# Patient Record
Sex: MALE | Race: WHITE | NOT HISPANIC OR LATINO | Employment: OTHER | URBAN - METROPOLITAN AREA
[De-identification: names, ages, dates, MRNs, and addresses within clinical notes are randomized per-mention and may not be internally consistent; named-entity substitution may affect disease eponyms.]

---

## 2017-04-21 ENCOUNTER — HOSPITAL ENCOUNTER (EMERGENCY)
Facility: MEDICAL CENTER | Age: 77
End: 2017-04-22
Attending: EMERGENCY MEDICINE
Payer: MEDICARE

## 2017-04-21 ENCOUNTER — APPOINTMENT (OUTPATIENT)
Dept: RADIOLOGY | Facility: MEDICAL CENTER | Age: 77
End: 2017-04-21
Attending: EMERGENCY MEDICINE
Payer: MEDICARE

## 2017-04-21 DIAGNOSIS — K40.21 BILATERAL RECURRENT INGUINAL HERNIA WITHOUT OBSTRUCTION OR GANGRENE: ICD-10-CM

## 2017-04-21 PROCEDURE — 85025 COMPLETE CBC W/AUTO DIFF WBC: CPT

## 2017-04-21 PROCEDURE — 36415 COLL VENOUS BLD VENIPUNCTURE: CPT

## 2017-04-21 PROCEDURE — 74176 CT ABD & PELVIS W/O CONTRAST: CPT

## 2017-04-21 PROCEDURE — 80053 COMPREHEN METABOLIC PANEL: CPT

## 2017-04-21 PROCEDURE — 99284 EMERGENCY DEPT VISIT MOD MDM: CPT

## 2017-04-21 ASSESSMENT — PAIN SCALES - GENERAL: PAINLEVEL_OUTOF10: 5

## 2017-04-21 ASSESSMENT — LIFESTYLE VARIABLES: DO YOU DRINK ALCOHOL: NO

## 2017-04-21 NOTE — ED AVS SNAPSHOT
[employer] Access Code: [email or web address]  Expires: 5/22/2017  1:17 AM    Your email address is not on file at [facility] [employer].  Email Addresses are required for you to sign up for [employer], please contact [phone] to verify your personal information and to provide your email address prior to attempting to register for [employer].    Sina Debbie  [address] [place] [address] [address]  [place] [address]    [employer]  A secure, online tool to manage your health information     [employer]’s [employer]® is a secure, online tool that connects you to your personalized health information from the privacy of your home -- day or night - making it very easy for you to manage your healthcare. Once the activation process is completed, you can even access your medical information using the [employer] pool, which is available for free in the Apple Pool store or Google Play store.     To learn more about [employer], visit [email or web address]    There are two levels of access available (as shown below):   My Chart Features  [facility] Primary Care Doctor [facility]  Specialists [facility]  Urgent  Care Non-[facility] Primary Care Doctor   Email your healthcare team securely and privately 24/7 X X X    Manage appointments: schedule your next appointment; view details of past/upcoming appointments X      Request prescription refills. X      View recent personal medical records, including lab and immunizations X X X X   View health record, including health history, allergies, medications X X X X   Read reports about your outpatient visits, procedures, consult and ER notes X X X X   See your discharge summary, which is a recap of your hospital and/or ER visit that includes your diagnosis, lab results, and care plan X X  X     How to register for [facility]:  Once your e-mail address has been verified, follow the following steps to sign up for [employer].     1. Go to  [email or web address]  2. Click on the Sign Up Now box, which takes you to the New Member Sign Up page. You  will need to provide the following information:  a. Enter your [employer] Access Code exactly as it appears at the top of this page. (You will not need to use this code after you’ve completed the sign-up process. If you do not sign up before the expiration date, you must request a new code.)   b. Enter your date of birth.   c. Enter your home email address.   d. Click Submit, and follow the next screen’s instructions.  3. Create a [employer] ID. This will be your [employer] login ID and cannot be changed, so think of one that is secure and easy to remember.  4. Create a [employer] password. You can change your password at any time.  5. Enter your Password Reset Question and Answer. This can be used at a later time if you forget your password.   6. Enter your e-mail address. This allows you to receive e-mail notifications when new information is available in [employer]  7. Click Sign Up. You can now view your health information.    For assistance activating your [employer] account, call [phone]

## 2017-04-21 NOTE — ED AVS SNAPSHOT
Home Care Instructions                                                                                                                Sina Lewis   MRN: [id number]    Department:  [facility], Emergency Dept   Date of Visit:  4/21/2017            [facility], Emergency Dept    [address] [address]    Phone:  [phone]      You were seen by     Rola Rea M.D.      Your Diagnosis Was     Bilateral recurrent inguinal hernia without obstruction or gangrene     K40.21       Follow-up Information     1. Follow up with Matias Chun M.D.. Go in 2 days.    Specialty:  Family Medicine    Why:  For complete recheck    Contact information    [address] [facility] [address]  [address]  [place] [address]  [phone]          2. Schedule an appointment as soon as possible for a visit with Eufemia Acevedo M.D..    Specialty:  Surgery    Why:  For hernia recheck    Contact information    [address]  [address]  [facility] [address]  [phone]        Medication Information     Review all of your home medications and newly ordered medications with your primary doctor and/or pharmacist as soon as possible. Follow medication instructions as directed by your doctor and/or pharmacist.     Please keep your complete medication list with you and share with your physician. Update the information when medications are discontinued, doses are changed, or new medications (including over-the-counter products) are added; and carry medication information at all times in the event of emergency situations.               Medication List      ASK your doctor about these medications        Instructions    Morning Afternoon Evening Bedtime    aspirin 81 MG tablet        Take 81 mg by mouth every day. Enteric coated   Dose:  81 mg                        doxazosin 4 MG Tabs   Commonly known as:  CARDURA        Take 4 mg by mouth every day.   Dose:  4 mg                        LORAZEPAM        Take 2 mg by  mouth every bedtime.   Dose:  2 mg                        lovastatin 40 MG tablet   Commonly known as:  MEVACOR        Take 40 mg by mouth every evening.   Dose:  40 mg                        metoprolol SR 25 MG Tb24   Commonly known as:  TOPROL XL        Take 25 mg by mouth every day.   Dose:  25 mg                        nitroglycerin 0.4 MG Subl   Commonly known as:  NITROSTAT        Place 0.4 mg under tongue every 5 minutes as needed. May repeat x 3 for unrelieved chest pain   Dose:  0.4 mg                        omeprazole 20 MG delayed-release capsule   Commonly known as:  PRILOSEC        QDAY.                        SEROQUEL 200 MG Tabs   Generic drug:  quetiapine        Take 200 mg by mouth every day.   Dose:  200 mg                        SYNTHROID 75 MCG Tabs   Generic drug:  levothyroxine        Take 75 mcg by mouth every day.   Dose:  75 mcg                        tramadol 50 MG Tabs   Commonly known as:  ULTRAM        Take  mg by mouth every four hours as needed.   Dose:   mg                        zolpidem 5 MG Tabs   Commonly known as:  AMBIEN        Take 5 mg by mouth at bedtime as needed.   Dose:  5 mg                                Procedures and tests performed during your visit     CBC WITH DIFFERENTIAL    COMP METABOLIC PANEL    CT-RENAL COLIC EVALUATION(A/P W/O)    ESTIMATED GFR        Discharge Instructions       Please follow-up with your primary care provider, you may also contact the general surgeon listed for evaluation of your hernia if the pain continues. Return to the emergency department if he develops any new or worsening symptoms including nausea, vomiting, fevers, worsening pain, redness or swelling over the site of hernia, constipation or other concerns.      Inguinal Hernia, Adult , Care After  Refer to this sheet in the next few weeks. These discharge instructions provide you with general information on caring for yourself after you leave the hospital. Your caregiver  may also give you specific instructions. Your treatment has been planned according to the most current medical practices available, but unavoidable complications sometimes occur. If you have any problems or questions after discharge, please call your caregiver.  HOME CARE INSTRUCTIONS  · Put ice on the operative site.  ¨ Put ice in a plastic bag.  ¨ Place a towel between your skin and the bag.  ¨ Leave the ice on for 15-20 minutes at a time, 03-04 times a day while awake.  · Change bandages (dressings) as directed.  · Keep the wound dry and clean. The wound may be washed gently with soap and water. Gently blot or dab the wound dry. It is okay to take showers 24 to 48 hours after surgery. Do not take baths, use swimming pools, or use hot tubs for 10 days, or as directed by your caregiver.  · Only take over-the-counter or prescription medicines for pain, discomfort, or fever as directed by your caregiver.  · Continue your normal diet as directed.  · Do not lift anything more than 10 pounds or play contact sports for 3 weeks, or as directed.  SEEK MEDICAL CARE IF:  · There is redness, swelling, or increasing pain in the wound.  · There is fluid (pus) coming from the wound.  · There is drainage from a wound lasting longer than 1 day.  · You have an oral temperature above 102° F (38.9° C).  · You notice a bad smell coming from the wound or dressing.  · The wound breaks open after the stitches (sutures) have been removed.  · You notice increasing pain in the shoulders (shoulder strap areas).  · You develop dizzy episodes or fainting while standing.  · You feel sick to your stomach (nauseous) or throw up (vomit).  SEEK IMMEDIATE MEDICAL CARE IF:  · You develop a rash.  · You have difficulty breathing.  · You develop a reaction or have side effects to medicines you were given.  MAKE SURE YOU:   · Understand these instructions.  · Will watch your condition.  · Will get help right away if you are not doing well or get  worse.     This information is not intended to replace advice given to you by your health care provider. Make sure you discuss any questions you have with your health care provider.     Document Released: 01/18/2008 Document Revised: 01/08/2016 Document Reviewed: 11/17/2010  Elsevier Interactive Patient Education ©2016 [employer]            Patient Information     Patient Information    Following emergency treatment: all patient requiring follow-up care must return either to a private physician or a clinic if your condition worsens before you are able to obtain further medical attention, please return to the emergency room.     Billing Information    At [facility], we work to make the billing process streamlined for our patients.  Our Representatives are here to answer any questions you may have regarding your hospital bill.  If you have insurance coverage and have supplied your insurance information to us, we will submit a claim to your insurer on your behalf.  Should you have any questions regarding your bill, we can be reached online or by phone as follows:  Online: You are able pay your bills online or live chat with our representatives about any billing questions you may have. We are here to help Monday - Friday from 8:00am to 7:30pm and 9:00am - 12:00pm on Saturdays.  Please visit [email or web address]  for more information.   Phone:  [phone] or [phone]    Please note that your emergency physician, surgeon, pathologist, radiologist, anesthesiologist, and other specialists are not employed by [facility] and will therefore bill separately for their services.  Please contact them directly for any questions concerning their bills at the numbers below:     Emergency Physician Services:  [phone]  [facility]:  [phone]  [facility]:  [phone]  [facility]:  [phone]    1. Your final bill may vary from the  amount quoted upon discharge if all procedures are not complete at that time, or if your doctor has additional procedures of which we are not aware. You will receive an additional bill if you return to the Emergency Department at [facility] for suture removal regardless of the facility of which the sutures were placed.     2. Please arrange for settlement of this account at the emergency registration.    3. All self-pay accounts are due in full at the time of treatment.  If you are unable to meet this obligation then payment is expected within 4-5 days.     4. If you have had radiology studies (CT, X-ray, Ultrasound, MRI), you have received a preliminary result during your emergency department visit. Please contact the radiology department [phone] to receive a copy of your final result. Please discuss the Final result with your primary physician or with the follow up physician provided.     Crisis Hotline:  [place] Crisis Hotline:  5-841-WCUVNQT or 1-177.252.5639  [place] Crisis Hotline:    1-345.567.3394 or 121-645-7112         ED Discharge Follow Up Questions    1. In order to provide you with very good care, we would like to follow up with a phone call in the next few days.  May we have your permission to contact you?     YES /  NO    2. What is the best phone number to call you? (       )_____-__________    3. What is the best time to call you?      Morning  /  Afternoon  /  Evening                   Patient Signature:  ____________________________________________________________    Date:  ____________________________________________________________

## 2017-04-21 NOTE — ED AVS SNAPSHOT
4/22/2017    Sina Lewis  [address]  [place] [address]    Dear Sina:    [facility] wants to ensure your discharge home is safe and you or your loved ones have had all of your questions answered regarding your care after you leave the hospital.    Below is a list of resources and contact information should you have any questions regarding your hospital stay, follow-up instructions, or active medical symptoms.    Questions or Concerns Regarding… Contact   Medical Questions Related to Your Discharge  (7 days a week, 8am-5pm) Contact a Nurse Care Coordinator   [phone]   Medical Questions Not Related to Your Discharge  (24 hours a day / 7 days a week)  Contact the Nurse Health Line   [phone]    Medications or Discharge Instructions Refer to your discharge packet   or contact your [facility] Primary Care Provider   [phone]   Follow-up Appointment(s) Schedule your appointment via Stand In   or contact Scheduling [phone]   Billing Review your statement via [employer] In  or contact Billing [phone]   Medical Records Review your records via [employer]   or contact Medical Records [phone]     You may receive a telephone call within two days of discharge. This call is to make certain you understand your discharge instructions and have the opportunity to have any questions answered. You can also easily access your medical information, test results and upcoming appointments via the Stand In free online health management tool. You can learn more and sign up at [employer]/Stand In. For assistance setting up your [employer] In account, please call [phone].    Once again, we want to ensure your discharge home is safe and that you have a clear understanding of any next steps in your care. If you have any questions or concerns, please do not hesitate to contact us, we are here for you. Thank you for choosing [facility] for your healthcare needs.    Sincerely,    Your [facility] Healthcare Team

## 2017-04-22 VITALS
TEMPERATURE: 98.1 F | HEART RATE: 61 BPM | DIASTOLIC BLOOD PRESSURE: 88 MMHG | OXYGEN SATURATION: 93 % | SYSTOLIC BLOOD PRESSURE: 162 MMHG | HEIGHT: 69 IN | RESPIRATION RATE: 16 BRPM | WEIGHT: 219.8 LBS | BODY MASS INDEX: 32.56 KG/M2

## 2017-04-22 LAB
ALBUMIN SERPL BCP-MCNC: 4.3 G/DL (ref 3.2–4.9)
ALBUMIN/GLOB SERPL: 1.4 G/DL
ALP SERPL-CCNC: 86 U/L (ref 30–99)
ALT SERPL-CCNC: 32 U/L (ref 2–50)
ANION GAP SERPL CALC-SCNC: 11 MMOL/L (ref 0–11.9)
AST SERPL-CCNC: 32 U/L (ref 12–45)
BASOPHILS # BLD AUTO: 0.8 % (ref 0–1.8)
BASOPHILS # BLD: 0.06 K/UL (ref 0–0.12)
BILIRUB SERPL-MCNC: 0.4 MG/DL (ref 0.1–1.5)
BUN SERPL-MCNC: 17 MG/DL (ref 8–22)
CALCIUM SERPL-MCNC: 9.1 MG/DL (ref 8.5–10.5)
CHLORIDE SERPL-SCNC: 108 MMOL/L (ref 96–112)
CO2 SERPL-SCNC: 23 MMOL/L (ref 20–33)
CREAT SERPL-MCNC: 1.68 MG/DL (ref 0.5–1.4)
EOSINOPHIL # BLD AUTO: 0.32 K/UL (ref 0–0.51)
EOSINOPHIL NFR BLD: 4 % (ref 0–6.9)
ERYTHROCYTE [DISTWIDTH] IN BLOOD BY AUTOMATED COUNT: 53.9 FL (ref 35.9–50)
GFR SERPL CREATININE-BSD FRML MDRD: 40 ML/MIN/1.73 M 2
GLOBULIN SER CALC-MCNC: 3.1 G/DL (ref 1.9–3.5)
GLUCOSE SERPL-MCNC: 101 MG/DL (ref 65–99)
HCT VFR BLD AUTO: 43.7 % (ref 42–52)
HGB BLD-MCNC: 14.4 G/DL (ref 14–18)
IMM GRANULOCYTES # BLD AUTO: 0.02 K/UL (ref 0–0.11)
IMM GRANULOCYTES NFR BLD AUTO: 0.3 % (ref 0–0.9)
LYMPHOCYTES # BLD AUTO: 2.92 K/UL (ref 1–4.8)
LYMPHOCYTES NFR BLD: 36.7 % (ref 22–41)
MCH RBC QN AUTO: 30.8 PG (ref 27–33)
MCHC RBC AUTO-ENTMCNC: 33 G/DL (ref 33.7–35.3)
MCV RBC AUTO: 93.6 FL (ref 81.4–97.8)
MONOCYTES # BLD AUTO: 0.65 K/UL (ref 0–0.85)
MONOCYTES NFR BLD AUTO: 8.2 % (ref 0–13.4)
NEUTROPHILS # BLD AUTO: 3.99 K/UL (ref 1.82–7.42)
NEUTROPHILS NFR BLD: 50 % (ref 44–72)
NRBC # BLD AUTO: 0 K/UL
NRBC BLD AUTO-RTO: 0 /100 WBC
PLATELET # BLD AUTO: 138 K/UL (ref 164–446)
PMV BLD AUTO: 10.7 FL (ref 9–12.9)
POTASSIUM SERPL-SCNC: 4.3 MMOL/L (ref 3.6–5.5)
PROT SERPL-MCNC: 7.4 G/DL (ref 6–8.2)
RBC # BLD AUTO: 4.67 M/UL (ref 4.7–6.1)
SODIUM SERPL-SCNC: 142 MMOL/L (ref 135–145)
WBC # BLD AUTO: 8 K/UL (ref 4.8–10.8)

## 2017-04-22 NOTE — DISCHARGE INSTRUCTIONS
Please follow-up with your primary care provider, you may also contact the general surgeon listed for evaluation of your hernia if the pain continues. Return to the emergency department if he develops any new or worsening symptoms including nausea, vomiting, fevers, worsening pain, redness or swelling over the site of hernia, constipation or other concerns.      Inguinal Hernia, Adult , Care After  Refer to this sheet in the next few weeks. These discharge instructions provide you with general information on caring for yourself after you leave the hospital. Your caregiver may also give you specific instructions. Your treatment has been planned according to the most current medical practices available, but unavoidable complications sometimes occur. If you have any problems or questions after discharge, please call your caregiver.  HOME CARE INSTRUCTIONS  · Put ice on the operative site.  ¨ Put ice in a plastic bag.  ¨ Place a towel between your skin and the bag.  ¨ Leave the ice on for 15-20 minutes at a time, 03-04 times a day while awake.  · Change bandages (dressings) as directed.  · Keep the wound dry and clean. The wound may be washed gently with soap and water. Gently blot or dab the wound dry. It is okay to take showers 24 to 48 hours after surgery. Do not take baths, use swimming pools, or use hot tubs for 10 days, or as directed by your caregiver.  · Only take over-the-counter or prescription medicines for pain, discomfort, or fever as directed by your caregiver.  · Continue your normal diet as directed.  · Do not lift anything more than 10 pounds or play contact sports for 3 weeks, or as directed.  SEEK MEDICAL CARE IF:  · There is redness, swelling, or increasing pain in the wound.  · There is fluid (pus) coming from the wound.  · There is drainage from a wound lasting longer than 1 day.  · You have an oral temperature above 102° F (38.9° C).  · You notice a bad smell coming from the wound or  dressing.  · The wound breaks open after the stitches (sutures) have been removed.  · You notice increasing pain in the shoulders (shoulder strap areas).  · You develop dizzy episodes or fainting while standing.  · You feel sick to your stomach (nauseous) or throw up (vomit).  SEEK IMMEDIATE MEDICAL CARE IF:  · You develop a rash.  · You have difficulty breathing.  · You develop a reaction or have side effects to medicines you were given.  MAKE SURE YOU:   · Understand these instructions.  · Will watch your condition.  · Will get help right away if you are not doing well or get worse.     This information is not intended to replace advice given to you by your health care provider. Make sure you discuss any questions you have with your health care provider.     Document Released: 01/18/2008 Document Revised: 01/08/2016 Document Reviewed: 11/17/2010  [employer] Interactive Patient Education ©2016 [employer]

## 2017-04-22 NOTE — ED NOTES
Chief Complaint   Patient presents with   • LLQ Pain     inguinal area.  increases with palpation.  X1 month.  pt has history of bilateral hernia repair.       Denies difficulty urinating.  Appears to be in minimal distress.  Triage process explained to patient.  Pt back to waiting room.  Pt instructed to inform RN if any changes or questions arise.

## 2017-04-22 NOTE — ED PROVIDER NOTES
ED Provider Note    ED Provider Note    Scribed for Rola Rea M.D. by Rola Rea. 4/21/2017, 11:17 PM.    Primary care provider: Matias Chun M.D.  Means of arrival: walk-in  History obtained from: patient  History limited by: none    CHIEF COMPLAINT  Chief Complaint   Patient presents with   • LLQ Pain     inguinal area.  increases with palpation.  X1 month.  pt has history of bilateral hernia repair.         HPI  Sina Lewis is a 76 y.o. male who presents to the Emergency Department with bilateral inguinal pain, right greater than left on my assessment. He states he has had waxing and waning pain localized to bilateral inguinal areas onset 5-6 months ago, becoming more persistent but not worsening in severity. He states pressing on the affected area significantly exacerbates the pain, no alleviating factors. He does have a history of bilateral inguinal hernia repair, has not felt any abnormal bulges. He denies fevers, no nausea, no vomiting. He does endorse increased difficulty with urination over the past several years which he attributes to age and an enlarged prostate. This is unchanged today. He has not had any abnormal bowel movements, last bowel movement was yesterday. Denies any constipation, denies abdominal bloating, denies rashes or lesions. Denies associated testicular pain or swelling. Denies dysuria.    He did not have any history of impaired immunity, denies neck or back pain, states he does have some chronic left hip pain that is unchanged today, he is currently established with an orthopedic specialist for this.    REVIEW OF SYSTEMS  As documented in HPI.  All other systems reviewed and negative.C.     PAST MEDICAL HISTORY   has a past medical history of Thyroid disorder; Acute MI (CMS-[facility]); Diverticulitis; Hypertension; Psychiatric problem; Heart murmur; Arthritis; Pneumonia; Backpain; Congestive heart failure (CMS-[facility]); Arrhythmia; Bronchitis; and Abdominal pain, bilateral lower  "quadrant.    SURGICAL HISTORY   has past surgical history that includes other cardiac surgery and hernia repair.    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 No pertinent family history.    CURRENT MEDICATIONS  Home Medications     Reviewed by Althea Napoles R.N. (Registered Nurse) on 04/21/17 at 2254  Med List Status: Partial    Medication Last Dose Status    ASPIRIN 81 MG PO TABS  Active    doxazosin (CARDURA) 4 MG TABS  Active    LORAZEPAM  Active    LOVASTATIN 40 MG PO TABS  Active    metoprolol SR (TOPROL XL) 25 MG TB24  Active    nitroglycerin (NITROSTAT) 0.4 MG SUBL PRN Active    OMEPRAZOLE 20 MG PO CPDR  Active    quetiapine (SEROQUEL) 200 MG TABS  Active    SYNTHROID 75 MCG PO TABS  Active    tramadol (ULTRAM) 50 MG TABS  Active    zolpidem (AMBIEN) 5 MG TABS  Active                ALLERGIES  Allergies   Allergen Reactions   • Nkda [No Known Drug Allergy]        PHYSICAL EXAM  Vital Signs: /61 mmHg  Pulse 78  Temp(Src) 36.7 °C (98.1 °F)  Resp 14  Ht 1.753 m (5' 9.02\")  Wt 99.7 kg (219 lb 12.8 oz)  BMI 32.44 kg/m2  SpO2 93%  Constitutional: Alert, no acute distress  HENT: Normocephalic, atraumatic, moist mucus membranes  Eyes: Pupils equal and reactive, normal conjunctiva, non-icteric  Neck: Supple, normal range of motion, no stridor  Cardiovascular: Regular rhythm, Normal peripheral perfusion, no cyanosis, Normal cardiac auscultation  Pulmonary: No respiratory distress, normal work of breathing, no accessory muscle usage, Clear to auscultation bilaterally  Abdomen: Soft, non tender, mildly distended/obese, no peritoneal signs, bowel sounds are present, , point localized tenderness to right " inguinal canal without palpable mass, no palpable hernia defect, no overlying skin changes.   Skin: Warm, dry, no rashes or lesions  Back: No pain with active range of motion  Musculoskeletal: Normal range of motion in all extremities, no swelling or deformity noted  Neurologic: Alert, oriented, normal motor function, no speech deficits  Psychiatric: Normal and appropriate mood and affect    DIAGNOSTIC STUDIES/PROCEDURES:    LABS  Labs Reviewed   CBC WITH DIFFERENTIAL - Abnormal; Notable for the following:     RBC 4.67 (*)     MCHC 33.0 (*)     RDW 53.9 (*)     Platelet Count 138 (*)     All other components within normal limits   COMP METABOLIC PANEL - Abnormal; Notable for the following:     Glucose 101 (*)     Creatinine 1.68 (*)     All other components within normal limits   ESTIMATED GFR - Abnormal; Notable for the following:     GFR If  48 (*)     GFR If Non  40 (*)     All other components within normal limits     All labs reviewed by me.    Radiology results revealed:   CT-RENAL COLIC EVALUATION(A/P W/O)   Final Result         1. No urinary tract calculus identified. No renal collecting system in dilatation.      2. No evidence of inflammatory change in the abdomen or pelvis.  The study is however limited due to nonuse of intravenous contrast      3. Grossly similar appearance of the bilateral small fat-containing inguinal hernias since 2013.           COURSE & MEDICAL DECISION MAKING  Pertinent Labs & Imaging studies reviewed. (See chart for details)    Differential diagnoses include but are not limited to: Recurrent hernia, history and physical exam are less consistent with incarcerated hernia however due to patient obesity I'm unable to obtain a reliable inguinal exam. For this reason CT abdomen and pelvis ordered to evaluate for evidence of failed hernia repair.    11:17 PM - Patient seen and examined at bedside. Ordered CT renal, CBC with differential, CMP, Urinalysis  culture to evaluate his symptoms.     1:05 AM Patient reevaluated at bedside. Patient is well-appearing. He declines urinalysis. Patient agrees to be discharged home.       Decision Making:  This is a 76 y.o. year old male who presents with bilateral inguinal pain present only on palpation. On physical exam no evidence of incarcerated or spiculated hernia. On laboratory evaluation he does have normal white blood count, no evidence of infectious etiology. Creatinine mildly elevated at 1.68, this does appear to be consistent with his baseline creatinine. CT abdomen and pelvis ordered to evaluate for evidence of inguinal hernia as his exam is limited by morbid obesity. Small fat-containing inguinal hernias noted, present on previous examination in 2013.    Plan at this time is for discharge home. No indication for emergent operative intervention. Patient is referred to general surgery for outpatient follow-up to determine if elective repair may be desirable in this case. Return precautions given including development of constipation, fevers, abdominal pain and bloating, or any new or worsening symptoms.    The patient will return for new or worsening symptoms and is stable at the time of discharge.    The patient is referred to a primary physician for blood pressure management, diabetic screening, and for all other preventative health concerns.    DISPOSITION:  Patient will be discharged home in stable condition.    FOLLOW UP:  Matias Chun M.D.  [address]  [address]  [facility] [facility] [address]  [phone]    Go in 2 days  For complete recheck    Eufemia Acevedo M.D.  [address]  [address]  [facility] [address]  [phone]    Schedule an appointment as soon as possible for a visit  For hernia recheck      OUTPATIENT MEDICATIONS:  Discharge Medication List as of 4/22/2017  1:17 AM              FINAL IMPRESSION  1. Bilateral recurrent inguinal hernia without obstruction or gangrene      The note accurately reflects work and  decisions made by me.  Rola Rea  4/22/2017  5:36 AM

## 2017-04-22 NOTE — ED NOTES
Patient informed of need for urine sample per orders. States that he does not need to urinate at this time.

## 2017-04-24 ENCOUNTER — HOSPITAL ENCOUNTER (EMERGENCY)
Facility: MEDICAL CENTER | Age: 77
End: 2017-04-24
Attending: EMERGENCY MEDICINE
Payer: MEDICARE

## 2017-04-24 ENCOUNTER — APPOINTMENT (OUTPATIENT)
Dept: RADIOLOGY | Facility: MEDICAL CENTER | Age: 77
End: 2017-04-24
Attending: EMERGENCY MEDICINE
Payer: MEDICARE

## 2017-04-24 VITALS
OXYGEN SATURATION: 99 % | DIASTOLIC BLOOD PRESSURE: 80 MMHG | RESPIRATION RATE: 16 BRPM | SYSTOLIC BLOOD PRESSURE: 110 MMHG | BODY MASS INDEX: 32.61 KG/M2 | TEMPERATURE: 98.3 F | HEART RATE: 80 BPM | WEIGHT: 220.9 LBS

## 2017-04-24 DIAGNOSIS — K40.20 BILATERAL INGUINAL HERNIA WITHOUT OBSTRUCTION OR GANGRENE, RECURRENCE NOT SPECIFIED: ICD-10-CM

## 2017-04-24 DIAGNOSIS — M25.552 LEFT HIP PAIN: ICD-10-CM

## 2017-04-24 PROCEDURE — 73501 X-RAY EXAM HIP UNI 1 VIEW: CPT | Mod: LT

## 2017-04-24 PROCEDURE — 99284 EMERGENCY DEPT VISIT MOD MDM: CPT

## 2017-04-24 PROCEDURE — A9270 NON-COVERED ITEM OR SERVICE: HCPCS | Performed by: EMERGENCY MEDICINE

## 2017-04-24 PROCEDURE — 700102 HCHG RX REV CODE 250 W/ 637 OVERRIDE(OP): Performed by: EMERGENCY MEDICINE

## 2017-04-24 RX ORDER — HYDROCODONE BITARTRATE AND ACETAMINOPHEN 5; 325 MG/1; MG/1
2 TABLET ORAL ONCE
Status: COMPLETED | OUTPATIENT
Start: 2017-04-24 | End: 2017-04-24

## 2017-04-24 RX ORDER — HYDROCODONE BITARTRATE AND ACETAMINOPHEN 5; 325 MG/1; MG/1
1-2 TABLET ORAL EVERY 6 HOURS PRN
Qty: 20 TAB | Refills: 0 | Status: SHIPPED | OUTPATIENT
Start: 2017-04-24 | End: 2022-12-05

## 2017-04-24 RX ADMIN — HYDROCODONE BITARTRATE AND ACETAMINOPHEN 2 TABLET: 5; 325 TABLET ORAL at 20:21

## 2017-04-24 ASSESSMENT — ENCOUNTER SYMPTOMS
CHILLS: 0
FALLS: 0
CONSTIPATION: 0
ROS SKIN COMMENTS: +HERNIA
NAUSEA: 0
FEVER: 0
VOMITING: 0

## 2017-04-24 ASSESSMENT — PAIN SCALES - GENERAL: PAINLEVEL_OUTOF10: 5

## 2017-04-24 NOTE — ED AVS SNAPSHOT
4/24/2017    Sina Lewis  [address]  [place] [address]    Dear Sina:    [facility] wants to ensure your discharge home is safe and you or your loved ones have had all of your questions answered regarding your care after you leave the hospital.    Below is a list of resources and contact information should you have any questions regarding your hospital stay, follow-up instructions, or active medical symptoms.    Questions or Concerns Regarding… Contact   Medical Questions Related to Your Discharge  (7 days a week, 8am-5pm) Contact a Nurse Care Coordinator   [phone]   Medical Questions Not Related to Your Discharge  (24 hours a day / 7 days a week)  Contact the Nurse Health Line   [phone]    Medications or Discharge Instructions Refer to your discharge packet   or contact your [facility] Primary Care Provider   [phone]   Follow-up Appointment(s) Schedule your appointment via [employer]   or contact Scheduling [phone]   Billing Review your statement via [employer]  or contact Billing [phone]   Medical Records Review your records via [employer]   or contact Medical Records [phone]     You may receive a telephone call within two days of discharge. This call is to make certain you understand your discharge instructions and have the opportunity to have any questions answered. You can also easily access your medical information, test results and upcoming appointments via the [employer] free online health management tool. You can learn more and sign up at [employer]. For assistance setting up your [employer] account, please call [phone].    Once again, we want to ensure your discharge home is safe and that you have a clear understanding of any next steps in your care. If you have any questions or concerns, please do not hesitate to contact us, we are here for you. Thank you for choosing [facility] for your healthcare needs.    Sincerely,    Your [facility] Healthcare Team

## 2017-04-24 NOTE — ED AVS SNAPSHOT
Intapp Access Code: [email or web address]  Expires: 5/22/2017  1:17 AM    Your email address is not on file at [facility].  Email Addresses are required for you to sign up for Intapp, please contact [phone] to verify your personal information and to provide your email address prior to attempting to register for Intapp.    Sina Debbie  [address] [place] [address] [address]  [place] [address]    Intapp  A secure, online tool to manage your health information     [facility]’s Intapp® is a secure, online tool that connects you to your personalized health information from the privacy of your home -- day or night - making it very easy for you to manage your healthcare. Once the activation process is completed, you can even access your medical information using the Intapp pool, which is available for free in the Apple Pool store or Google Play store.     To learn more about Intapp, visit [email or web address]    There are two levels of access available (as shown below):   My Chart Features  [facility] Primary Care Doctor [facility]  Specialists [facility]  Urgent  Care Non-[facility] Primary Care Doctor   Email your healthcare team securely and privately 24/7 X X X    Manage appointments: schedule your next appointment; view details of past/upcoming appointments X      Request prescription refills. X      View recent personal medical records, including lab and immunizations X X X X   View health record, including health history, allergies, medications X X X X   Read reports about your outpatient visits, procedures, consult and ER notes X X X X   See your discharge summary, which is a recap of your hospital and/or ER visit that includes your diagnosis, lab results, and care plan X X  X     How to register for Carticipatet:  Once your e-mail address has been verified, follow the following steps to sign up for Intapp.     1. Go to  [email or web address]  2. Click on the Sign Up Now box, which takes you to the New Member Sign Up page. You  will need to provide the following information:  a. Enter your Demandbase Access Code exactly as it appears at the top of this page. (You will not need to use this code after you’ve completed the sign-up process. If you do not sign up before the expiration date, you must request a new code.)   b. Enter your date of birth.   c. Enter your home email address.   d. Click Submit, and follow the next screen’s instructions.  3. Create a [email or web address] ID. This will be your Demandbase login ID and cannot be changed, so think of one that is secure and easy to remember.  4. Create a Demandbase password. You can change your password at any time.  5. Enter your Password Reset Question and Answer. This can be used at a later time if you forget your password.   6. Enter your e-mail address. This allows you to receive e-mail notifications when new information is available in Demandbase.  7. Click Sign Up. You can now view your health information.    For assistance activating your Demandbase account, call [phone]

## 2017-04-24 NOTE — ED AVS SNAPSHOT
Home Care Instructions                                                                                                                Sina Lewis   MRN: [id number]    Department:  [facility], Emergency Dept   Date of Visit:  4/24/2017            [facility], Emergency Dept    [address]    [place] [address]    Phone:  [phone]      You were seen by     Bennie Salazar M.D.      Your Diagnosis Was     Bilateral inguinal hernia without obstruction or gangrene, recurrence not specified     K40.20       These are the medications you received during your hospitalization from 04/24/2017 1818 to 04/24/2017 2208     Date/Time Order Dose Route Action    04/24/2017 2021 hydrocodone-acetaminophen (NORCO) 5-325 MG per tablet 2 Tab 2 Tab Oral Given      Follow-up Information     1. Follow up with Mainor Yates M.D.. Schedule an appointment as soon as possible for a visit in 2 days.    Specialty:  Surgery    Why:  to discuss your hernias and possible repair    Contact information    [address]  [address]  [facility] [address] [phone]          2. Follow up with Matias Chun M.D..    Specialty:  Family Medicine    Why:  For your hip pain    Contact information    [address]  [address]  [facility] [address] [phone]        Medication Information     Review all of your home medications and newly ordered medications with your primary doctor and/or pharmacist as soon as possible. Follow medication instructions as directed by your doctor and/or pharmacist.     Please keep your complete medication list with you and share with your physician. Update the information when medications are discontinued, doses are changed, or new medications (including over-the-counter products) are added; and carry medication information at all times in the event of emergency situations.               Medication List      START taking these medications        Instructions    Morning Afternoon Evening Bedtime    hydrocodone-acetaminophen 5-325 MG Tabs per tablet   Last time this was given:  2 Tabs on 4/24/2017  8:21 PM   Commonly known as:  NORCO        Take 1-2 Tabs by mouth every 6 hours as needed.   Dose:  1-2 Tab                          ASK your doctor about these medications        Instructions    Morning Afternoon Evening Bedtime    aspirin 81 MG tablet        Take 81 mg by mouth every day. Enteric coated   Dose:  81 mg                        doxazosin 4 MG Tabs   Commonly known as:  CARDURA        Take 4 mg by mouth every day.   Dose:  4 mg                        LORAZEPAM        Take 2 mg by mouth every bedtime.   Dose:  2 mg                        lovastatin 40 MG tablet   Commonly known as:  MEVACOR        Take 40 mg by mouth every evening.   Dose:  40 mg                        metoprolol SR 25 MG Tb24   Commonly known as:  TOPROL XL        Take 25 mg by mouth every day.   Dose:  25 mg                        nitroglycerin 0.4 MG Subl   Commonly known as:  NITROSTAT        Place 0.4 mg under tongue every 5 minutes as needed. May repeat x 3 for unrelieved chest pain   Dose:  0.4 mg                        omeprazole 20 MG delayed-release capsule   Commonly known as:  PRILOSEC        QDAY.                        SEROQUEL 200 MG Tabs   Generic drug:  quetiapine        Take 200 mg by mouth every day.   Dose:  200 mg                        SYNTHROID 75 MCG Tabs   Generic drug:  levothyroxine        Take 75 mcg by mouth every day.   Dose:  75 mcg                        tramadol 50 MG Tabs   Commonly known as:  ULTRAM        Take  mg by mouth every four hours as needed.   Dose:   mg                        zolpidem 5 MG Tabs   Commonly known as:  AMBIEN        Take 5 mg by mouth at bedtime as needed.   Dose:  5 mg                             Where to Get Your Medications      You can get these medications from any pharmacy     Bring a paper prescription for each of these  medications    - hydrocodone-acetaminophen 5-325 MG Tabs per tablet            Procedures and tests performed during your visit     DX-HIP-UNILATERAL-WITH PELVIS-1 VIEW LEFT        Discharge Instructions       Return if you have the hernia come out and not go back in, severe pain, fever, vomiting, or stop passing gas or stool.   Take a stool softener with the pain medication. No drinking or driving on [place].   Inguinal Hernia, Adult , Care After  Refer to this sheet in the next few weeks. These discharge instructions provide you with general information on caring for yourself after you leave the hospital. Your caregiver may also give you specific instructions. Your treatment has been planned according to the most current medical practices available, but unavoidable complications sometimes occur. If you have any problems or questions after discharge, please call your caregiver.  HOME CARE INSTRUCTIONS  · Put ice on the operative site.  ¨ Put ice in a plastic bag.  ¨ Place a towel between your skin and the bag.  ¨ Leave the ice on for 15-20 minutes at a time, 03-04 times a day while awake.  · Change bandages (dressings) as directed.  · Keep the wound dry and clean. The wound may be washed gently with soap and water. Gently blot or dab the wound dry. It is okay to take showers 24 to 48 hours after surgery. Do not take baths, use swimming pools, or use hot tubs for 10 days, or as directed by your caregiver.  · Only take over-the-counter or prescription medicines for pain, discomfort, or fever as directed by your caregiver.  · Continue your normal diet as directed.  · Do not lift anything more than 10 pounds or play contact sports for 3 weeks, or as directed.  SEEK MEDICAL CARE IF:  · There is redness, swelling, or increasing pain in the wound.  · There is fluid (pus) coming from the wound.  · There is drainage from a wound lasting longer than 1 day.  · You have an oral temperature above 102° F (38.9° C).  · You notice a  bad smell coming from the wound or dressing.  · The wound breaks open after the stitches (sutures) have been removed.  · You notice increasing pain in the shoulders (shoulder strap areas).  · You develop dizzy episodes or fainting while standing.  · You feel sick to your stomach (nauseous) or throw up (vomit).  SEEK IMMEDIATE MEDICAL CARE IF:  · You develop a rash.  · You have difficulty breathing.  · You develop a reaction or have side effects to medicines you were given.  MAKE SURE YOU:   · Understand these instructions.  · Will watch your condition.  · Will get help right away if you are not doing well or get worse.     This information is not intended to replace advice given to you by your health care provider. Make sure you discuss any questions you have with your health care provider.     Document Released: 01/18/2008 Document Revised: 01/08/2016 Document Reviewed: 11/17/2010  Deepclass Interactive Patient Education ©2016 [employer]                Patient Information     Patient Information    Following emergency treatment: all patient requiring follow-up care must return either to a private physician or a clinic if your condition worsens before you are able to obtain further medical attention, please return to the emergency room.     Billing Information    [facility] we work to make the billing process streamlined for our patients.  Our Representatives are here to answer any questions you may have regarding your hospital bill.  If you have insurance coverage and have supplied your insurance information to us, we will submit a claim to your insurer on your behalf.  Should you have any questions regarding your bill, we can be reached online or by phone as follows:  Online: You are able pay your bills online or live chat with our representatives about any billing questions you may have. We are here to help Monday - Friday from 8:00am to 7:30pm and 9:00am - 12:00pm on Saturdays.  Please visit  [email or web address]  for more information.   Phone:  [phone] or [phone]    Please note that your emergency physician, surgeon, pathologist, radiologist, anesthesiologist, and other specialists are not employed by [facility] and will therefore bill separately for their services.  Please contact them directly for any questions concerning their bills at the numbers below:     Emergency Physician Services:  [phone]  [facility]:  [phone]  [facility]:  [phone]  [facility]:  [phone]    1. Your final bill may vary from the amount quoted upon discharge if all procedures are not complete at that time, or if your doctor has additional procedures of which we are not aware. You will receive an additional bill if you return to the Emergency Department at [facility] for suture removal regardless of the facility of which the sutures were placed.     2. Please arrange for settlement of this account at the emergency registration.    3. All self-pay accounts are due in full at the time of treatment.  If you are unable to meet this obligation then payment is expected within 4-5 days.     4. If you have had radiology studies (CT, X-ray, Ultrasound, MRI), you have received a preliminary result during your emergency department visit. Please contact the radiology department [phone] to receive a copy of your final result. Please discuss the Final result with your primary physician or with the follow up physician provided.     Crisis Hotline:  [place] Crisis Hotline:  8-942-ZOVWZKI or 1-317.805.2694  [place] Crisis Hotline:    1-300.464.3903 or 434-112-6624         ED Discharge Follow Up Questions    1. In order to provide you with very good care, we would like to follow up with a phone call in the next few days.  May we have your permission to contact you?     YES /  NO    2. What is the best phone number to call  you? (       )_____-__________    3. What is the best time to call you?      Morning  /  Afternoon  /  Evening                   Patient Signature:  ____________________________________________________________    Date:  ____________________________________________________________

## 2017-04-25 NOTE — ED NOTES
Pt to triage .  Chief Complaint   Patient presents with   • Groin Pain     bilateral groin pain    • Hernia     pt states he was dx with bilateral hernias yesterday but did not have pain until today.

## 2017-04-25 NOTE — DISCHARGE INSTRUCTIONS
Return if you have the hernia come out and not go back in, severe pain, fever, vomiting, or stop passing gas or stool.   Take a stool softener with the pain medication. No drinking or driving on [address].   Inguinal Hernia, Adult , Care After  Refer to this sheet in the next few weeks. These discharge instructions provide you with general information on caring for yourself after you leave the hospital. Your caregiver may also give you specific instructions. Your treatment has been planned according to the most current medical practices available, but unavoidable complications sometimes occur. If you have any problems or questions after discharge, please call your caregiver.  HOME CARE INSTRUCTIONS  · Put ice on the operative site.  ¨ Put ice in a plastic bag.  ¨ Place a towel between your skin and the bag.  ¨ Leave the ice on for 15-20 minutes at a time, 03-04 times a day while awake.  · Change bandages (dressings) as directed.  · Keep the wound dry and clean. The wound may be washed gently with soap and water. Gently blot or dab the wound dry. It is okay to take showers 24 to 48 hours after surgery. Do not take baths, use swimming pools, or use hot tubs for 10 days, or as directed by your caregiver.  · Only take over-the-counter or prescription medicines for pain, discomfort, or fever as directed by your caregiver.  · Continue your normal diet as directed.  · Do not lift anything more than 10 pounds or play contact sports for 3 weeks, or as directed.  SEEK MEDICAL CARE IF:  · There is redness, swelling, or increasing pain in the wound.  · There is fluid (pus) coming from the wound.  · There is drainage from a wound lasting longer than 1 day.  · You have an oral temperature above 102° F (38.9° C).  · You notice a bad smell coming from the wound or dressing.  · The wound breaks open after the stitches (sutures) have been removed.  · You notice increasing pain in the shoulders (shoulder strap areas).  · You develop dizzy  episodes or fainting while standing.  · You feel sick to your stomach (nauseous) or throw up (vomit).  SEEK IMMEDIATE MEDICAL CARE IF:  · You develop a rash.  · You have difficulty breathing.  · You develop a reaction or have side effects to medicines you were given.  MAKE SURE YOU:   · Understand these instructions.  · Will watch your condition.  · Will get help right away if you are not doing well or get worse.     This information is not intended to replace advice given to you by your health care provider. Make sure you discuss any questions you have with your health care provider.     Document Released: 01/18/2008 Document Revised: 01/08/2016 Document Reviewed: 11/17/2010  [email or web address] Interactive Patient Education ©2016 [employer]

## 2017-04-25 NOTE — ED NOTES
Patient to follow up with surgeon and PCP in the morning. Patient instructed not to drive under the influence of narcotics. Patient verbalizes understanding of discharge instructions and home medications.

## 2017-04-25 NOTE — ED PROVIDER NOTES
"ED Provider Note    Scribed for Bennie Salazar M.D. by Kiara Connors. 4/24/2017, 8:09 PM.    Primary care provider: Matias Chun M.D.  Means of arrival: Walk-In  History obtained from: Patient  History limited by: None    CHIEF COMPLAINT  Chief Complaint   Patient presents with   • Groin Pain     bilateral groin pain    • Hernia     pt states he was dx with bilateral hernias yesterday but did not have pain until today.      HPI  Sina Lewis is a 76 y.o. male who presents to the Emergency Department with worsening groin pain secondary to bilateral hernias onset twenty four hours prior to my examination.  The patient first developed his bilateral hernias a few months ago. The patient really did not know that he had hernias until yesterday after he was told he had them following a CT scan.  He denies recent falls, heavy lifting, or other forms of acute trauma.  However, the patient has suffered from worsening bilateral groin pain.    Per patient, the right groin has remained a ten out of ten in severity.  His left side is slightly less painful but is trying to \"catch up\" to the right.  His pain is exacerbated with movement and improved with rest.  The patient does not note associated fevers, chills, nausea, or vomiting.  He is not suffering from dysuria, hematuria, incontinence, retention, or constipation.  The patient's chronic medical history includes CHF, hypertension, and thyroid disorder. He denies additional symptoms or further pertinent medical history.      Patient also complains of left hip pain after falling while filling his tire several months ago. Pain is remained constant.    REVIEW OF SYSTEMS  Review of Systems   Constitutional: Negative for fever and chills.   Gastrointestinal: Negative for nausea, vomiting and constipation.   Genitourinary: Negative for dysuria, urgency and hematuria.        +Bilateral groin pain    Musculoskeletal: Positive for joint pain (Left hip ). Negative for " falls.   Skin:        +Hernia   All other systems reviewed and are negative.  C.     PAST MEDICAL HISTORY   has a past medical history of Thyroid disorder; Acute MI (CMS-HCC); Diverticulitis; Hypertension; Psychiatric problem; Heart murmur; Arthritis; Pneumonia; Backpain; Congestive heart failure (CMS-HCC); Arrhythmia; Bronchitis; and Abdominal pain, bilateral lower quadrant.    SURGICAL HISTORY   has past surgical history that includes other cardiac surgery and hernia repair.    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 No pertinent family history.    CURRENT MEDICATIONS  Home Medications     Reviewed by Naa Ha R.N. (Registered Nurse) on 04/24/17 at 1832  Med List Status: Partial    Medication Last Dose Status    ASPIRIN 81 MG PO TABS 4/24/2017 Active    doxazosin (CARDURA) 4 MG TABS 4/24/2017 Active    LORAZEPAM 4/24/2017 Active    LOVASTATIN 40 MG PO TABS 4/24/2017 Active    metoprolol SR (TOPROL XL) 25 MG TB24 4/24/2017 Active    nitroglycerin (NITROSTAT) 0.4 MG SUBL PRN Active    OMEPRAZOLE 20 MG PO CPDR 4/24/2017 Active    quetiapine (SEROQUEL) 200 MG TABS 4/24/2017 Active    SYNTHROID 75 MCG PO TABS 4/24/2017 Active    tramadol (ULTRAM) 50 MG TABS 4/24/2017 Active    zolpidem (AMBIEN) 5 MG TABS 4/23/2017 Active              ALLERGIES  Allergies   Allergen Reactions   • Nkda [No Known Drug Allergy]      PHYSICAL EXAM  VITAL SIGNS: /77 mmHg  Pulse 84  Temp(Src) 36.8 °C (98.3 °F)  Resp 16  Wt 100.2 kg (220 lb 14.4 oz)  SpO2 99%    Constitutional: Well developed, Well nourished, Mild to moderate distress.   HENT: Normocephalic, Atraumatic, Oropharynx moist.   Eyes: Conjunctiva normal, No discharge.   Neck: Supple, No stridor,    Cardiovascular: Normal heart rate, Normal rhythm, No murmurs, equal pulses.   Pulmonary: Normal breath sounds, No respiratory distress, No wheezing, No rales, No rhonchi.  Chest: No chest wall tenderness or deformity.   Abdomen:Soft, No tenderness, No masses, no rebound, no guarding.   Back: No CVA tenderness.   : Bilaterally reducible hernias with tenderness, more severe on the right  Musculoskeletal: Patient is slight pain and tenderness to palpation in the left greater trochanter.     Skin: Warm, Dry, No erythema, No rash.   Neurologic: Alert & oriented x 3, Normal motor function,  No focal deficits noted.   Psychiatric: Affect normal, Judgment normal, Mood normal.     RADIOLOGY  DX-HIP-UNILATERAL-WITH PELVIS-1 VIEW LEFT   Final Result      Post left hip arthroplasty. No evidence of fracture or dislocation of the arthroplasty components.        The radiologist's interpretation of all radiological studies have been reviewed by me.    COURSE & MEDICAL DECISION MAKING  Pertinent Labs & Imaging studies reviewed. (See chart for details)    8:09 PM - Patient seen and examined at bedside. Patient will be treated with 10 mg of Norco via two 5 mg tablets. Ordered DX-Hip Unilateral Pelvis Left to evaluate his symptoms.    9:39 Pm- At this time the patient's ED work up is complete.  I reviewed images and radiologist's interpretations, see above.  The patient will be updated shortly.     10:01 PM- Re-examined; The patient is resting in bed with improved comfort. I discussed his above findings and plans for discharge with a prescription for Norco. The patient will not drive or operate machinery while taking narcotics.  He was encouraged to take stool softeners and understands that narcotics can cause constipation. The patient was given a referral to Dr. Yates, with whom he will schedule a follow up appointment in two days. He was instructed to return to the ED if his symptoms worsen. The patient will receive further  information on groin pain to take home.  All questions and concerns were addressed.  Patient understands and agrees     Medical Decision Making: This point, think the patient's pain may be secondary to the hernias but these are easily reducible. He just had a CT scan yesterday his labs are unremarkable as yesterday. To be repeated. I discussed with the patient that as long as his hernias are reducible there is nothing further we need to do at this point in time. X-ray of the hip was done to rule out a fracture or dislocation this is negative. At this point, think the patient follow up with his primary she was given referral to general surgery discussed treatment for his hernias. He'll be given Norco for his pain. I reviewed prescription monitoring program for patient's narcotic use before prescribing a scheduled drug.The patient will not drink alcohol nor drive with prescribed medications    . The patient will return for new or worsening symptoms and is stable at the time of discharge.  The patient is referred to a primary physician for blood pressure management, diabetic screening, and for all other preventative health concerns.    DISPOSITION:  Patient will be discharged home in stable condition.    FOLLOW UP:  Mainor Yates M.D.  [address] [facility] [address]  [address]  [facility] [id number] [phone]    Schedule an appointment as soon as possible for a visit in 2 days  to discuss your hernias and possible repair    Matias Chun M.D.  [address] [facility] [address]  [address]  [facility] [address]  [phone]      For your hip pain    OUTPATIENT MEDICATIONS:  Discharge Medication List as of 4/24/2017 10:08 PM      START taking these medications    Details   hydrocodone-acetaminophen (NORCO) 5-325 MG Tab per tablet Take 1-2 Tabs by mouth every 6 hours as needed., Disp-20 Tab, R-0, Print Rx Paper           FINAL IMPRESSION  1. Bilateral inguinal hernia without obstruction or gangrene, recurrence not specified    2. Left hip pain         I, Kiara Connors (Scribe), am scribing for, and in the presence of, Bennie Salazar M.D.    Electronically signed by: Kiara Connors ([place]), 4/24/2017    IBennie M.D. personally performed the services described in this documentation, as scribed by Kiara Connors in my presence, and it is both accurate and complete.    The note accurately reflects work and decisions made by me.  Bennie Salazar  4/25/2017  1:50 AM

## 2017-04-26 ENCOUNTER — HOSPITAL ENCOUNTER (OUTPATIENT)
Dept: LAB | Facility: MEDICAL CENTER | Age: 77
End: 2017-04-26
Attending: FAMILY MEDICINE
Payer: MEDICARE

## 2017-04-26 LAB
CK SERPL-CCNC: 974 U/L (ref 0–154)
TSH SERPL DL<=0.005 MIU/L-ACNC: 0.75 UIU/ML (ref 0.3–3.7)

## 2017-04-26 PROCEDURE — 36415 COLL VENOUS BLD VENIPUNCTURE: CPT

## 2017-04-26 PROCEDURE — 84443 ASSAY THYROID STIM HORMONE: CPT

## 2017-04-26 PROCEDURE — 82550 ASSAY OF CK (CPK): CPT

## 2017-04-27 ENCOUNTER — HOSPITAL ENCOUNTER (OUTPATIENT)
Facility: MEDICAL CENTER | Age: 77
End: 2017-04-27
Attending: FAMILY MEDICINE
Payer: MEDICARE

## 2017-04-27 PROCEDURE — 80307 DRUG TEST PRSMV CHEM ANLYZR: CPT

## 2017-04-27 PROCEDURE — G0480 DRUG TEST DEF 1-7 CLASSES: HCPCS

## 2017-04-30 LAB
AMPHET CTO UR CFM-MCNC: NEGATIVE NG/ML
BARBITURATES CTO UR CFM-MCNC: NEGATIVE NG/ML
BENZODIAZ CTO UR CFM-MCNC: NEGATIVE NG/ML
CANNABINOIDS CTO UR CFM-MCNC: NEGATIVE NG/ML
COCAINE CTO UR CFM-MCNC: NEGATIVE NG/ML
DRUG COMMENT 753798: NORMAL
METHADONE CTO UR CFM-MCNC: NEGATIVE NG/ML
OPIATES CTO UR CFM-MCNC: POSITIVE NG/ML
PCP CTO UR CFM-MCNC: NEGATIVE NG/ML
PROPOXYPH CTO UR CFM-MCNC: NEGATIVE NG/ML

## 2017-05-02 LAB
6MAM UR CFM-MCNC: <10 NG/ML
CODEINE UR CFM-MCNC: <20 NG/ML
HYDROCODONE UR CFM-MCNC: 179 NG/ML
HYDROMORPHONE UR CFM-MCNC: <20 NG/ML
MORPHINE UR CFM-MCNC: <20 NG/ML
NORHYDROCODONE UR CFM-MCNC: 322 NG/ML
NOROXYCODONE UR CFM-MCNC: <20 NG/ML
OPIATES UR NOROXYM Q0836: <20 NG/ML
OXYCODONE UR CFM-MCNC: <20 NG/ML
OXYMORPHONE UR CFM-MCNC: <20 NG/ML

## 2017-08-18 ENCOUNTER — HOSPITAL ENCOUNTER (OUTPATIENT)
Dept: RADIOLOGY | Facility: MEDICAL CENTER | Age: 77
End: 2017-08-18
Attending: FAMILY MEDICINE
Payer: MEDICARE

## 2017-08-18 ENCOUNTER — HOSPITAL ENCOUNTER (OUTPATIENT)
Dept: LAB | Facility: MEDICAL CENTER | Age: 77
End: 2017-08-18
Attending: FAMILY MEDICINE
Payer: MEDICARE

## 2017-08-18 DIAGNOSIS — M25.552 LEFT HIP PAIN: ICD-10-CM

## 2017-08-18 LAB
ALBUMIN SERPL BCP-MCNC: 3.9 G/DL (ref 3.2–4.9)
ALBUMIN/GLOB SERPL: 1.3 G/DL
ALP SERPL-CCNC: 81 U/L (ref 30–99)
ALT SERPL-CCNC: 33 U/L (ref 2–50)
ANION GAP SERPL CALC-SCNC: 9 MMOL/L (ref 0–11.9)
AST SERPL-CCNC: 30 U/L (ref 12–45)
BASOPHILS # BLD AUTO: 0.7 % (ref 0–1.8)
BASOPHILS # BLD: 0.04 K/UL (ref 0–0.12)
BILIRUB SERPL-MCNC: 0.6 MG/DL (ref 0.1–1.5)
BUN SERPL-MCNC: 13 MG/DL (ref 8–22)
CALCIUM SERPL-MCNC: 9.1 MG/DL (ref 8.5–10.5)
CHLORIDE SERPL-SCNC: 109 MMOL/L (ref 96–112)
CO2 SERPL-SCNC: 21 MMOL/L (ref 20–33)
CREAT SERPL-MCNC: 1.47 MG/DL (ref 0.5–1.4)
EOSINOPHIL # BLD AUTO: 0.31 K/UL (ref 0–0.51)
EOSINOPHIL NFR BLD: 5.6 % (ref 0–6.9)
ERYTHROCYTE [DISTWIDTH] IN BLOOD BY AUTOMATED COUNT: 51.8 FL (ref 35.9–50)
GFR SERPL CREATININE-BSD FRML MDRD: 46 ML/MIN/1.73 M 2
GLOBULIN SER CALC-MCNC: 3.1 G/DL (ref 1.9–3.5)
GLUCOSE SERPL-MCNC: 88 MG/DL (ref 65–99)
HCT VFR BLD AUTO: 44.7 % (ref 42–52)
HGB BLD-MCNC: 14.6 G/DL (ref 14–18)
IMM GRANULOCYTES # BLD AUTO: 0.01 K/UL (ref 0–0.11)
IMM GRANULOCYTES NFR BLD AUTO: 0.2 % (ref 0–0.9)
LYMPHOCYTES # BLD AUTO: 1.92 K/UL (ref 1–4.8)
LYMPHOCYTES NFR BLD: 34.6 % (ref 22–41)
MCH RBC QN AUTO: 32.5 PG (ref 27–33)
MCHC RBC AUTO-ENTMCNC: 32.7 G/DL (ref 33.7–35.3)
MCV RBC AUTO: 99.6 FL (ref 81.4–97.8)
MONOCYTES # BLD AUTO: 0.43 K/UL (ref 0–0.85)
MONOCYTES NFR BLD AUTO: 7.7 % (ref 0–13.4)
NEUTROPHILS # BLD AUTO: 2.84 K/UL (ref 1.82–7.42)
NEUTROPHILS NFR BLD: 51.2 % (ref 44–72)
NRBC # BLD AUTO: 0 K/UL
NRBC BLD AUTO-RTO: 0 /100 WBC
PLATELET # BLD AUTO: 121 K/UL (ref 164–446)
PMV BLD AUTO: 11.4 FL (ref 9–12.9)
POTASSIUM SERPL-SCNC: 4 MMOL/L (ref 3.6–5.5)
PROT SERPL-MCNC: 7 G/DL (ref 6–8.2)
RBC # BLD AUTO: 4.49 M/UL (ref 4.7–6.1)
SODIUM SERPL-SCNC: 139 MMOL/L (ref 135–145)
TSH SERPL DL<=0.005 MIU/L-ACNC: 0.65 UIU/ML (ref 0.3–3.7)
WBC # BLD AUTO: 5.6 K/UL (ref 4.8–10.8)

## 2017-08-18 PROCEDURE — 84443 ASSAY THYROID STIM HORMONE: CPT

## 2017-08-18 PROCEDURE — 73501 X-RAY EXAM HIP UNI 1 VIEW: CPT | Mod: LT

## 2017-08-18 PROCEDURE — 85025 COMPLETE CBC W/AUTO DIFF WBC: CPT

## 2017-08-18 PROCEDURE — 80053 COMPREHEN METABOLIC PANEL: CPT

## 2017-08-18 PROCEDURE — 36415 COLL VENOUS BLD VENIPUNCTURE: CPT

## 2018-02-22 ENCOUNTER — HOSPITAL ENCOUNTER (OUTPATIENT)
Dept: RADIOLOGY | Facility: MEDICAL CENTER | Age: 78
End: 2018-02-22
Attending: FAMILY MEDICINE
Payer: MEDICARE

## 2018-02-22 DIAGNOSIS — R05.9 COUGH: ICD-10-CM

## 2018-02-22 PROCEDURE — 71046 X-RAY EXAM CHEST 2 VIEWS: CPT

## 2018-03-27 ENCOUNTER — HOSPITAL ENCOUNTER (OUTPATIENT)
Dept: LAB | Facility: MEDICAL CENTER | Age: 78
End: 2018-03-27
Attending: FAMILY MEDICINE
Payer: MEDICARE

## 2018-03-27 LAB
ALBUMIN SERPL BCP-MCNC: 4.2 G/DL (ref 3.2–4.9)
ALBUMIN/GLOB SERPL: 1.2 G/DL
ALP SERPL-CCNC: 98 U/L (ref 30–99)
ALT SERPL-CCNC: 49 U/L (ref 2–50)
ANION GAP SERPL CALC-SCNC: 7 MMOL/L (ref 0–11.9)
AST SERPL-CCNC: 52 U/L (ref 12–45)
BASOPHILS # BLD AUTO: 0.7 % (ref 0–1.8)
BASOPHILS # BLD: 0.05 K/UL (ref 0–0.12)
BILIRUB SERPL-MCNC: 0.7 MG/DL (ref 0.1–1.5)
BUN SERPL-MCNC: 21 MG/DL (ref 8–22)
CALCIUM SERPL-MCNC: 9.3 MG/DL (ref 8.5–10.5)
CHLORIDE SERPL-SCNC: 108 MMOL/L (ref 96–112)
CO2 SERPL-SCNC: 24 MMOL/L (ref 20–33)
CREAT SERPL-MCNC: 1.82 MG/DL (ref 0.5–1.4)
EOSINOPHIL # BLD AUTO: 0.31 K/UL (ref 0–0.51)
EOSINOPHIL NFR BLD: 4.2 % (ref 0–6.9)
ERYTHROCYTE [DISTWIDTH] IN BLOOD BY AUTOMATED COUNT: 51.8 FL (ref 35.9–50)
GLOBULIN SER CALC-MCNC: 3.5 G/DL (ref 1.9–3.5)
GLUCOSE SERPL-MCNC: 107 MG/DL (ref 65–99)
HCT VFR BLD AUTO: 46.7 % (ref 42–52)
HGB BLD-MCNC: 15.4 G/DL (ref 14–18)
IMM GRANULOCYTES # BLD AUTO: 0.02 K/UL (ref 0–0.11)
IMM GRANULOCYTES NFR BLD AUTO: 0.3 % (ref 0–0.9)
LYMPHOCYTES # BLD AUTO: 2.51 K/UL (ref 1–4.8)
LYMPHOCYTES NFR BLD: 33.9 % (ref 22–41)
MCH RBC QN AUTO: 32.9 PG (ref 27–33)
MCHC RBC AUTO-ENTMCNC: 33 G/DL (ref 33.7–35.3)
MCV RBC AUTO: 99.8 FL (ref 81.4–97.8)
MONOCYTES # BLD AUTO: 0.56 K/UL (ref 0–0.85)
MONOCYTES NFR BLD AUTO: 7.6 % (ref 0–13.4)
NEUTROPHILS # BLD AUTO: 3.96 K/UL (ref 1.82–7.42)
NEUTROPHILS NFR BLD: 53.3 % (ref 44–72)
NRBC # BLD AUTO: 0 K/UL
NRBC BLD-RTO: 0 /100 WBC
PLATELET # BLD AUTO: 162 K/UL (ref 164–446)
PMV BLD AUTO: 11.1 FL (ref 9–12.9)
POTASSIUM SERPL-SCNC: 4.1 MMOL/L (ref 3.6–5.5)
PROT SERPL-MCNC: 7.7 G/DL (ref 6–8.2)
RBC # BLD AUTO: 4.68 M/UL (ref 4.7–6.1)
SODIUM SERPL-SCNC: 139 MMOL/L (ref 135–145)
TSH SERPL DL<=0.005 MIU/L-ACNC: 2.34 UIU/ML (ref 0.38–5.33)
WBC # BLD AUTO: 7.4 K/UL (ref 4.8–10.8)

## 2018-03-27 PROCEDURE — 80053 COMPREHEN METABOLIC PANEL: CPT

## 2018-03-27 PROCEDURE — 85025 COMPLETE CBC W/AUTO DIFF WBC: CPT

## 2018-03-27 PROCEDURE — 36415 COLL VENOUS BLD VENIPUNCTURE: CPT

## 2018-03-27 PROCEDURE — 84443 ASSAY THYROID STIM HORMONE: CPT

## 2018-06-13 ENCOUNTER — HOSPITAL ENCOUNTER (EMERGENCY)
Dept: HOSPITAL 8 - ED | Age: 78
Discharge: HOME | End: 2018-06-13
Payer: MEDICARE

## 2018-06-13 VITALS — DIASTOLIC BLOOD PRESSURE: 56 MMHG | SYSTOLIC BLOOD PRESSURE: 119 MMHG

## 2018-06-13 DIAGNOSIS — E78.5: ICD-10-CM

## 2018-06-13 DIAGNOSIS — R10.9: ICD-10-CM

## 2018-06-13 DIAGNOSIS — E03.9: ICD-10-CM

## 2018-06-13 DIAGNOSIS — I48.91: ICD-10-CM

## 2018-06-13 DIAGNOSIS — I12.9: Primary | ICD-10-CM

## 2018-06-13 DIAGNOSIS — N18.3: ICD-10-CM

## 2018-06-13 DIAGNOSIS — Z87.891: ICD-10-CM

## 2018-06-13 LAB
ALBUMIN SERPL-MCNC: 3.6 G/DL (ref 3.4–5)
ALP SERPL-CCNC: 113 U/L (ref 45–117)
ALT SERPL-CCNC: 64 U/L (ref 12–78)
ANION GAP SERPL CALC-SCNC: 8 MMOL/L (ref 5–15)
BASOPHILS # BLD AUTO: 0.02 X10^3/UL (ref 0–0.1)
BASOPHILS NFR BLD AUTO: 0 % (ref 0–1)
BILIRUB SERPL-MCNC: 0.6 MG/DL (ref 0.2–1)
CALCIUM SERPL-MCNC: 9.1 MG/DL (ref 8.5–10.1)
CHLORIDE SERPL-SCNC: 110 MMOL/L (ref 98–107)
CREAT SERPL-MCNC: 2.06 MG/DL (ref 0.7–1.3)
EOSINOPHIL # BLD AUTO: 0.41 X10^3/UL (ref 0–0.4)
EOSINOPHIL NFR BLD AUTO: 6 % (ref 1–7)
ERYTHROCYTE [DISTWIDTH] IN BLOOD BY AUTOMATED COUNT: 15 % (ref 9.4–14.8)
INR PPP: 1.06 (ref 0.93–1.1)
LYMPHOCYTES # BLD AUTO: 1.65 X10^3/UL (ref 1–3.4)
LYMPHOCYTES NFR BLD AUTO: 25 % (ref 22–44)
MCH RBC QN AUTO: 33.9 PG (ref 27.5–34.5)
MCHC RBC AUTO-ENTMCNC: 33.6 G/DL (ref 33.2–36.2)
MCV RBC AUTO: 101 FL (ref 81–97)
MD: NO
MONOCYTES # BLD AUTO: 0.49 X10^3/UL (ref 0.2–0.8)
MONOCYTES NFR BLD AUTO: 8 % (ref 2–9)
NEUTROPHILS # BLD AUTO: 3.98 X10^3/UL (ref 1.8–6.8)
NEUTROPHILS NFR BLD AUTO: 61 % (ref 42–75)
PLATELET # BLD AUTO: 134 X10^3/UL (ref 130–400)
PMV BLD AUTO: 9.1 FL (ref 7.4–10.4)
PROT SERPL-MCNC: 7.7 G/DL (ref 6.4–8.2)
PROTHROMBIN TIME: 10.9 SECONDS (ref 9.6–11.5)
RBC # BLD AUTO: 4.33 X10^6/UL (ref 4.38–5.82)

## 2018-06-13 PROCEDURE — 85610 PROTHROMBIN TIME: CPT

## 2018-06-13 PROCEDURE — 36415 COLL VENOUS BLD VENIPUNCTURE: CPT

## 2018-06-13 PROCEDURE — 80053 COMPREHEN METABOLIC PANEL: CPT

## 2018-06-13 PROCEDURE — 99284 EMERGENCY DEPT VISIT MOD MDM: CPT

## 2018-06-13 PROCEDURE — 85025 COMPLETE CBC W/AUTO DIFF WBC: CPT

## 2018-06-15 ENCOUNTER — HOSPITAL ENCOUNTER (EMERGENCY)
Dept: HOSPITAL 8 - ED | Age: 78
Discharge: HOME | End: 2018-06-15
Payer: MEDICARE

## 2018-06-15 VITALS — WEIGHT: 242.51 LBS | BODY MASS INDEX: 35.92 KG/M2 | HEIGHT: 69 IN

## 2018-06-15 VITALS — SYSTOLIC BLOOD PRESSURE: 135 MMHG | DIASTOLIC BLOOD PRESSURE: 78 MMHG

## 2018-06-15 DIAGNOSIS — K29.00: Primary | ICD-10-CM

## 2018-06-15 DIAGNOSIS — N18.9: ICD-10-CM

## 2018-06-15 LAB
ALBUMIN SERPL-MCNC: 3.5 G/DL (ref 3.4–5)
ALP SERPL-CCNC: 118 U/L (ref 45–117)
ALT SERPL-CCNC: 66 U/L (ref 12–78)
ANION GAP SERPL CALC-SCNC: 6 MMOL/L (ref 5–15)
BASOPHILS # BLD AUTO: 0.04 X10^3/UL (ref 0–0.1)
BASOPHILS NFR BLD AUTO: 1 % (ref 0–1)
BILIRUB SERPL-MCNC: 0.8 MG/DL (ref 0.2–1)
CALCIUM SERPL-MCNC: 8.1 MG/DL (ref 8.5–10.1)
CHLORIDE SERPL-SCNC: 113 MMOL/L (ref 98–107)
CREAT SERPL-MCNC: 1.99 MG/DL (ref 0.7–1.3)
EOSINOPHIL # BLD AUTO: 0.44 X10^3/UL (ref 0–0.4)
EOSINOPHIL NFR BLD AUTO: 8 % (ref 1–7)
ERYTHROCYTE [DISTWIDTH] IN BLOOD BY AUTOMATED COUNT: 14.9 % (ref 9.4–14.8)
LYMPHOCYTES # BLD AUTO: 1.73 X10^3/UL (ref 1–3.4)
LYMPHOCYTES NFR BLD AUTO: 30 % (ref 22–44)
MCH RBC QN AUTO: 33.6 PG (ref 27.5–34.5)
MCHC RBC AUTO-ENTMCNC: 33.6 G/DL (ref 33.2–36.2)
MCV RBC AUTO: 100.1 FL (ref 81–97)
MD: NO
MONOCYTES # BLD AUTO: 0.42 X10^3/UL (ref 0.2–0.8)
MONOCYTES NFR BLD AUTO: 7 % (ref 2–9)
NEUTROPHILS # BLD AUTO: 3.06 X10^3/UL (ref 1.8–6.8)
NEUTROPHILS NFR BLD AUTO: 54 % (ref 42–75)
PLATELET # BLD AUTO: 126 X10^3/UL (ref 130–400)
PMV BLD AUTO: 9.3 FL (ref 7.4–10.4)
PROT SERPL-MCNC: 7.4 G/DL (ref 6.4–8.2)
RBC # BLD AUTO: 4.18 X10^6/UL (ref 4.38–5.82)

## 2018-06-15 PROCEDURE — 36415 COLL VENOUS BLD VENIPUNCTURE: CPT

## 2018-06-15 PROCEDURE — 80053 COMPREHEN METABOLIC PANEL: CPT

## 2018-06-15 PROCEDURE — 85025 COMPLETE CBC W/AUTO DIFF WBC: CPT

## 2018-06-15 PROCEDURE — 99284 EMERGENCY DEPT VISIT MOD MDM: CPT

## 2018-06-25 ENCOUNTER — HOSPITAL ENCOUNTER (OUTPATIENT)
Dept: LAB | Facility: MEDICAL CENTER | Age: 78
End: 2018-06-25
Attending: FAMILY MEDICINE
Payer: MEDICARE

## 2018-12-17 ENCOUNTER — HOSPITAL ENCOUNTER (OUTPATIENT)
Facility: MEDICAL CENTER | Age: 78
End: 2018-12-17
Attending: FAMILY MEDICINE
Payer: MEDICARE

## 2018-12-17 ENCOUNTER — HOSPITAL ENCOUNTER (OUTPATIENT)
Dept: LAB | Facility: MEDICAL CENTER | Age: 78
End: 2018-12-17
Attending: FAMILY MEDICINE
Payer: MEDICARE

## 2018-12-17 LAB
BASOPHILS # BLD AUTO: 0.6 % (ref 0–1.8)
BASOPHILS # BLD: 0.04 K/UL (ref 0–0.12)
EOSINOPHIL # BLD AUTO: 0.34 K/UL (ref 0–0.51)
EOSINOPHIL NFR BLD: 5.2 % (ref 0–6.9)
ERYTHROCYTE [DISTWIDTH] IN BLOOD BY AUTOMATED COUNT: 56.5 FL (ref 35.9–50)
HCT VFR BLD AUTO: 43.2 % (ref 42–52)
HGB BLD-MCNC: 14.7 G/DL (ref 14–18)
IMM GRANULOCYTES # BLD AUTO: 0.01 K/UL (ref 0–0.11)
IMM GRANULOCYTES NFR BLD AUTO: 0.2 % (ref 0–0.9)
LYMPHOCYTES # BLD AUTO: 1.97 K/UL (ref 1–4.8)
LYMPHOCYTES NFR BLD: 30 % (ref 22–41)
MCH RBC QN AUTO: 35 PG (ref 27–33)
MCHC RBC AUTO-ENTMCNC: 34 G/DL (ref 33.7–35.3)
MCV RBC AUTO: 102.9 FL (ref 81.4–97.8)
MONOCYTES # BLD AUTO: 0.49 K/UL (ref 0–0.85)
MONOCYTES NFR BLD AUTO: 7.5 % (ref 0–13.4)
NEUTROPHILS # BLD AUTO: 3.72 K/UL (ref 1.82–7.42)
NEUTROPHILS NFR BLD: 56.5 % (ref 44–72)
NRBC # BLD AUTO: 0 K/UL
NRBC BLD-RTO: 0 /100 WBC
PLATELET # BLD AUTO: 123 K/UL (ref 164–446)
PMV BLD AUTO: 11.3 FL (ref 9–12.9)
PSA SERPL-MCNC: 0.72 NG/ML (ref 0–4)
RBC # BLD AUTO: 4.2 M/UL (ref 4.7–6.1)
WBC # BLD AUTO: 6.6 K/UL (ref 4.8–10.8)

## 2018-12-17 PROCEDURE — 84153 ASSAY OF PSA TOTAL: CPT | Mod: GA

## 2018-12-17 PROCEDURE — 81003 URINALYSIS AUTO W/O SCOPE: CPT

## 2018-12-17 PROCEDURE — 36415 COLL VENOUS BLD VENIPUNCTURE: CPT | Mod: GA

## 2018-12-17 PROCEDURE — 85025 COMPLETE CBC W/AUTO DIFF WBC: CPT | Mod: GA

## 2018-12-18 LAB
APPEARANCE UR: CLEAR
BILIRUB UR QL STRIP.AUTO: NEGATIVE
COLOR UR: YELLOW
GLUCOSE UR STRIP.AUTO-MCNC: NEGATIVE MG/DL
KETONES UR STRIP.AUTO-MCNC: NEGATIVE MG/DL
LEUKOCYTE ESTERASE UR QL STRIP.AUTO: NEGATIVE
MICRO URNS: NORMAL
NITRITE UR QL STRIP.AUTO: NEGATIVE
PH UR STRIP.AUTO: 6.5 [PH]
PROT UR QL STRIP: NEGATIVE MG/DL
RBC UR QL AUTO: NEGATIVE
SP GR UR STRIP.AUTO: 1.02
UROBILINOGEN UR STRIP.AUTO-MCNC: 0.2 MG/DL

## 2019-04-25 ENCOUNTER — HOSPITAL ENCOUNTER (OUTPATIENT)
Dept: LAB | Facility: MEDICAL CENTER | Age: 79
End: 2019-04-25
Attending: FAMILY MEDICINE
Payer: MEDICARE

## 2019-04-25 LAB
ALBUMIN SERPL BCP-MCNC: 4.3 G/DL (ref 3.2–4.9)
ALBUMIN/GLOB SERPL: 1.2 G/DL
ALP SERPL-CCNC: 123 U/L (ref 30–99)
ALT SERPL-CCNC: 32 U/L (ref 2–50)
ANION GAP SERPL CALC-SCNC: 10 MMOL/L (ref 0–11.9)
AST SERPL-CCNC: 39 U/L (ref 12–45)
BASOPHILS # BLD AUTO: 0.7 % (ref 0–1.8)
BASOPHILS # BLD: 0.05 K/UL (ref 0–0.12)
BILIRUB SERPL-MCNC: 0.5 MG/DL (ref 0.1–1.5)
BUN SERPL-MCNC: 20 MG/DL (ref 8–22)
CALCIUM SERPL-MCNC: 9.3 MG/DL (ref 8.5–10.5)
CHLORIDE SERPL-SCNC: 107 MMOL/L (ref 96–112)
CHOLEST SERPL-MCNC: 142 MG/DL (ref 100–199)
CO2 SERPL-SCNC: 24 MMOL/L (ref 20–33)
CREAT SERPL-MCNC: 1.95 MG/DL (ref 0.5–1.4)
EOSINOPHIL # BLD AUTO: 0.37 K/UL (ref 0–0.51)
EOSINOPHIL NFR BLD: 5.2 % (ref 0–6.9)
ERYTHROCYTE [DISTWIDTH] IN BLOOD BY AUTOMATED COUNT: 55.2 FL (ref 35.9–50)
EST. AVERAGE GLUCOSE BLD GHB EST-MCNC: 131 MG/DL
GLOBULIN SER CALC-MCNC: 3.7 G/DL (ref 1.9–3.5)
GLUCOSE SERPL-MCNC: 111 MG/DL (ref 65–99)
HBA1C MFR BLD: 6.2 % (ref 0–5.6)
HCT VFR BLD AUTO: 45.4 % (ref 42–52)
HDLC SERPL-MCNC: 34 MG/DL
HGB BLD-MCNC: 15.2 G/DL (ref 14–18)
IMM GRANULOCYTES # BLD AUTO: 0.02 K/UL (ref 0–0.11)
IMM GRANULOCYTES NFR BLD AUTO: 0.3 % (ref 0–0.9)
LDLC SERPL CALC-MCNC: 82 MG/DL
LYMPHOCYTES # BLD AUTO: 2.19 K/UL (ref 1–4.8)
LYMPHOCYTES NFR BLD: 30.6 % (ref 22–41)
MCH RBC QN AUTO: 34.9 PG (ref 27–33)
MCHC RBC AUTO-ENTMCNC: 33.5 G/DL (ref 33.7–35.3)
MCV RBC AUTO: 104.4 FL (ref 81.4–97.8)
MONOCYTES # BLD AUTO: 0.51 K/UL (ref 0–0.85)
MONOCYTES NFR BLD AUTO: 7.1 % (ref 0–13.4)
NEUTROPHILS # BLD AUTO: 4.02 K/UL (ref 1.82–7.42)
NEUTROPHILS NFR BLD: 56.1 % (ref 44–72)
NRBC # BLD AUTO: 0 K/UL
NRBC BLD-RTO: 0 /100 WBC
PLATELET # BLD AUTO: 155 K/UL (ref 164–446)
PMV BLD AUTO: 11.3 FL (ref 9–12.9)
POTASSIUM SERPL-SCNC: 4.8 MMOL/L (ref 3.6–5.5)
PROT SERPL-MCNC: 8 G/DL (ref 6–8.2)
RBC # BLD AUTO: 4.35 M/UL (ref 4.7–6.1)
SODIUM SERPL-SCNC: 141 MMOL/L (ref 135–145)
TRIGL SERPL-MCNC: 132 MG/DL (ref 0–149)
TSH SERPL DL<=0.005 MIU/L-ACNC: 2.07 UIU/ML (ref 0.38–5.33)
WBC # BLD AUTO: 7.2 K/UL (ref 4.8–10.8)

## 2019-04-25 PROCEDURE — 80061 LIPID PANEL: CPT

## 2019-04-25 PROCEDURE — 86617 LYME DISEASE ANTIBODY: CPT | Mod: 91

## 2019-04-25 PROCEDURE — 85025 COMPLETE CBC W/AUTO DIFF WBC: CPT

## 2019-04-25 PROCEDURE — 36415 COLL VENOUS BLD VENIPUNCTURE: CPT

## 2019-04-25 PROCEDURE — 83036 HEMOGLOBIN GLYCOSYLATED A1C: CPT | Mod: GA

## 2019-04-25 PROCEDURE — 84443 ASSAY THYROID STIM HORMONE: CPT

## 2019-04-25 PROCEDURE — 80053 COMPREHEN METABOLIC PANEL: CPT

## 2019-04-28 LAB
B BURGDOR IGG SER QL IB: NEGATIVE
B BURGDOR IGM SER QL IB: NEGATIVE

## 2021-01-14 DIAGNOSIS — Z23 NEED FOR VACCINATION: ICD-10-CM

## 2022-12-05 ENCOUNTER — APPOINTMENT (OUTPATIENT)
Dept: RADIOLOGY | Facility: MEDICAL CENTER | Age: 82
DRG: 871 | End: 2022-12-05
Attending: EMERGENCY MEDICINE
Payer: MEDICARE

## 2022-12-05 ENCOUNTER — HOSPITAL ENCOUNTER (INPATIENT)
Facility: MEDICAL CENTER | Age: 82
LOS: 24 days | DRG: 871 | End: 2022-12-29
Attending: EMERGENCY MEDICINE | Admitting: HOSPITALIST
Payer: MEDICARE
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
## 2022-12-05 DIAGNOSIS — N39.0 URINARY TRACT INFECTION WITHOUT HEMATURIA, SITE UNSPECIFIED: ICD-10-CM

## 2022-12-05 DIAGNOSIS — R09.02 HYPOXEMIA: ICD-10-CM

## 2022-12-05 DIAGNOSIS — G30.1 SEVERE LATE ONSET ALZHEIMER'S DEMENTIA WITH MOOD DISTURBANCE (HCC): ICD-10-CM

## 2022-12-05 DIAGNOSIS — R53.1 WEAKNESS: ICD-10-CM

## 2022-12-05 DIAGNOSIS — I63.9 CEREBROVASCULAR ACCIDENT (CVA), UNSPECIFIED MECHANISM (HCC): ICD-10-CM

## 2022-12-05 DIAGNOSIS — J18.9 PNEUMONIA OF RIGHT LUNG DUE TO INFECTIOUS ORGANISM, UNSPECIFIED PART OF LUNG: ICD-10-CM

## 2022-12-05 DIAGNOSIS — L29.9 ITCHING: ICD-10-CM

## 2022-12-05 DIAGNOSIS — F02.C3 SEVERE LATE ONSET ALZHEIMER'S DEMENTIA WITH MOOD DISTURBANCE (HCC): ICD-10-CM

## 2022-12-05 PROBLEM — R73.03 PREDIABETES: Status: ACTIVE | Noted: 2022-12-05

## 2022-12-05 PROBLEM — I24.9 ACS (ACUTE CORONARY SYNDROME) (HCC): Status: ACTIVE | Noted: 2022-12-05

## 2022-12-05 PROBLEM — D69.6 THROMBOCYTOPENIA (HCC): Status: ACTIVE | Noted: 2022-12-05

## 2022-12-05 PROBLEM — A41.9 SEPSIS (HCC): Status: ACTIVE | Noted: 2022-12-05

## 2022-12-05 PROBLEM — E87.6 HYPOKALEMIA: Status: ACTIVE | Noted: 2022-12-05

## 2022-12-05 PROBLEM — R29.6 FREQUENT FALLS: Status: ACTIVE | Noted: 2022-12-05

## 2022-12-05 PROBLEM — R74.8 ELEVATED CPK: Status: ACTIVE | Noted: 2022-12-05

## 2022-12-05 LAB
ALBUMIN SERPL BCP-MCNC: 4.1 G/DL (ref 3.2–4.9)
ALBUMIN/GLOB SERPL: 1.6 G/DL
ALP SERPL-CCNC: 123 U/L (ref 30–99)
ALT SERPL-CCNC: 22 U/L (ref 2–50)
ANION GAP SERPL CALC-SCNC: 10 MMOL/L (ref 7–16)
ANION GAP SERPL CALC-SCNC: 11 MMOL/L (ref 7–16)
APPEARANCE UR: CLEAR
AST SERPL-CCNC: 22 U/L (ref 12–45)
BACTERIA #/AREA URNS HPF: ABNORMAL /HPF
BASOPHILS # BLD AUTO: 0.3 % (ref 0–1.8)
BASOPHILS # BLD AUTO: 0.3 % (ref 0–1.8)
BASOPHILS # BLD: 0.03 K/UL (ref 0–0.12)
BASOPHILS # BLD: 0.03 K/UL (ref 0–0.12)
BILIRUB SERPL-MCNC: 1.3 MG/DL (ref 0.1–1.5)
BILIRUB UR QL STRIP.AUTO: NEGATIVE
BUN SERPL-MCNC: 13 MG/DL (ref 8–22)
BUN SERPL-MCNC: 15 MG/DL (ref 8–22)
CALCIUM SERPL-MCNC: 8.8 MG/DL (ref 8.5–10.5)
CALCIUM SERPL-MCNC: 9 MG/DL (ref 8.5–10.5)
CHLORIDE SERPL-SCNC: 106 MMOL/L (ref 96–112)
CHLORIDE SERPL-SCNC: 107 MMOL/L (ref 96–112)
CO2 SERPL-SCNC: 22 MMOL/L (ref 20–33)
CO2 SERPL-SCNC: 24 MMOL/L (ref 20–33)
COLOR UR: YELLOW
CREAT SERPL-MCNC: 1.52 MG/DL (ref 0.5–1.4)
CREAT SERPL-MCNC: 1.6 MG/DL (ref 0.5–1.4)
EKG IMPRESSION: NORMAL
EOSINOPHIL # BLD AUTO: 0 K/UL (ref 0–0.51)
EOSINOPHIL # BLD AUTO: 0.03 K/UL (ref 0–0.51)
EOSINOPHIL NFR BLD: 0 % (ref 0–6.9)
EOSINOPHIL NFR BLD: 0.3 % (ref 0–6.9)
EPI CELLS #/AREA URNS HPF: ABNORMAL /HPF
ERYTHROCYTE [DISTWIDTH] IN BLOOD BY AUTOMATED COUNT: 55.2 FL (ref 35.9–50)
ERYTHROCYTE [DISTWIDTH] IN BLOOD BY AUTOMATED COUNT: 55.9 FL (ref 35.9–50)
FLUAV RNA SPEC QL NAA+PROBE: NEGATIVE
FLUBV RNA SPEC QL NAA+PROBE: NEGATIVE
GFR SERPLBLD CREATININE-BSD FMLA CKD-EPI: 43 ML/MIN/1.73 M 2
GFR SERPLBLD CREATININE-BSD FMLA CKD-EPI: 45 ML/MIN/1.73 M 2
GLOBULIN SER CALC-MCNC: 2.6 G/DL (ref 1.9–3.5)
GLUCOSE SERPL-MCNC: 113 MG/DL (ref 65–99)
GLUCOSE SERPL-MCNC: 118 MG/DL (ref 65–99)
GLUCOSE UR STRIP.AUTO-MCNC: NEGATIVE MG/DL
HCT VFR BLD AUTO: 36.2 % (ref 42–52)
HCT VFR BLD AUTO: 38.8 % (ref 42–52)
HGB BLD-MCNC: 12.4 G/DL (ref 14–18)
HGB BLD-MCNC: 13.3 G/DL (ref 14–18)
HYALINE CASTS #/AREA URNS LPF: ABNORMAL /LPF
IMM GRANULOCYTES # BLD AUTO: 0.03 K/UL (ref 0–0.11)
IMM GRANULOCYTES # BLD AUTO: 0.04 K/UL (ref 0–0.11)
IMM GRANULOCYTES NFR BLD AUTO: 0.3 % (ref 0–0.9)
IMM GRANULOCYTES NFR BLD AUTO: 0.5 % (ref 0–0.9)
KETONES UR STRIP.AUTO-MCNC: NEGATIVE MG/DL
LACTATE SERPL-SCNC: 2 MMOL/L (ref 0.5–2)
LEUKOCYTE ESTERASE UR QL STRIP.AUTO: ABNORMAL
LYMPHOCYTES # BLD AUTO: 1.15 K/UL (ref 1–4.8)
LYMPHOCYTES # BLD AUTO: 1.16 K/UL (ref 1–4.8)
LYMPHOCYTES NFR BLD: 13.1 % (ref 22–41)
LYMPHOCYTES NFR BLD: 13.1 % (ref 22–41)
MAGNESIUM SERPL-MCNC: 1.7 MG/DL (ref 1.5–2.5)
MCH RBC QN AUTO: 35 PG (ref 27–33)
MCH RBC QN AUTO: 35.1 PG (ref 27–33)
MCHC RBC AUTO-ENTMCNC: 34.3 G/DL (ref 33.7–35.3)
MCHC RBC AUTO-ENTMCNC: 34.3 G/DL (ref 33.7–35.3)
MCV RBC AUTO: 102.3 FL (ref 81.4–97.8)
MCV RBC AUTO: 102.4 FL (ref 81.4–97.8)
MICRO URNS: ABNORMAL
MONOCYTES # BLD AUTO: 0.77 K/UL (ref 0–0.85)
MONOCYTES # BLD AUTO: 0.85 K/UL (ref 0–0.85)
MONOCYTES NFR BLD AUTO: 8.7 % (ref 0–13.4)
MONOCYTES NFR BLD AUTO: 9.6 % (ref 0–13.4)
NEUTROPHILS # BLD AUTO: 6.75 K/UL (ref 1.82–7.42)
NEUTROPHILS # BLD AUTO: 6.85 K/UL (ref 1.82–7.42)
NEUTROPHILS NFR BLD: 76.7 % (ref 44–72)
NEUTROPHILS NFR BLD: 77.1 % (ref 44–72)
NITRITE UR QL STRIP.AUTO: POSITIVE
NRBC # BLD AUTO: 0 K/UL
NRBC # BLD AUTO: 0 K/UL
NRBC BLD-RTO: 0 /100 WBC
NRBC BLD-RTO: 0 /100 WBC
PH UR STRIP.AUTO: 5.5 [PH] (ref 5–8)
PLATELET # BLD AUTO: 79 K/UL (ref 164–446)
PLATELET # BLD AUTO: 93 K/UL (ref 164–446)
PMV BLD AUTO: 10.6 FL (ref 9–12.9)
PMV BLD AUTO: 10.9 FL (ref 9–12.9)
POTASSIUM SERPL-SCNC: 3.3 MMOL/L (ref 3.6–5.5)
POTASSIUM SERPL-SCNC: 3.5 MMOL/L (ref 3.6–5.5)
PROCALCITONIN SERPL-MCNC: 0.46 NG/ML
PROT SERPL-MCNC: 6.7 G/DL (ref 6–8.2)
PROT UR QL STRIP: 30 MG/DL
RBC # BLD AUTO: 3.54 M/UL (ref 4.7–6.1)
RBC # BLD AUTO: 3.79 M/UL (ref 4.7–6.1)
RBC # URNS HPF: ABNORMAL /HPF
RBC UR QL AUTO: ABNORMAL
RSV RNA SPEC QL NAA+PROBE: NEGATIVE
SARS-COV-2 RNA RESP QL NAA+PROBE: NOTDETECTED
SODIUM SERPL-SCNC: 138 MMOL/L (ref 135–145)
SODIUM SERPL-SCNC: 142 MMOL/L (ref 135–145)
SP GR UR STRIP.AUTO: 1.02
SPECIMEN SOURCE: NORMAL
T4 FREE SERPL-MCNC: 1.28 NG/DL (ref 0.93–1.7)
TROPONIN T SERPL-MCNC: 63 NG/L (ref 6–19)
TROPONIN T SERPL-MCNC: 66 NG/L (ref 6–19)
TROPONIN T SERPL-MCNC: 79 NG/L (ref 6–19)
TSH SERPL DL<=0.005 MIU/L-ACNC: 0.23 UIU/ML (ref 0.38–5.33)
UROBILINOGEN UR STRIP.AUTO-MCNC: 0.2 MG/DL
VIT B12 SERPL-MCNC: 331 PG/ML (ref 211–911)
WBC # BLD AUTO: 8.8 K/UL (ref 4.8–10.8)
WBC # BLD AUTO: 8.9 K/UL (ref 4.8–10.8)
WBC #/AREA URNS HPF: ABNORMAL /HPF

## 2022-12-05 PROCEDURE — 84484 ASSAY OF TROPONIN QUANT: CPT | Mod: 91

## 2022-12-05 PROCEDURE — 99285 EMERGENCY DEPT VISIT HI MDM: CPT

## 2022-12-05 PROCEDURE — 36415 COLL VENOUS BLD VENIPUNCTURE: CPT

## 2022-12-05 PROCEDURE — 700102 HCHG RX REV CODE 250 W/ 637 OVERRIDE(OP): Performed by: EMERGENCY MEDICINE

## 2022-12-05 PROCEDURE — 73501 X-RAY EXAM HIP UNI 1 VIEW: CPT | Mod: LT

## 2022-12-05 PROCEDURE — A9270 NON-COVERED ITEM OR SERVICE: HCPCS | Performed by: EMERGENCY MEDICINE

## 2022-12-05 PROCEDURE — 80048 BASIC METABOLIC PNL TOTAL CA: CPT

## 2022-12-05 PROCEDURE — 80053 COMPREHEN METABOLIC PANEL: CPT

## 2022-12-05 PROCEDURE — 96375 TX/PRO/DX INJ NEW DRUG ADDON: CPT

## 2022-12-05 PROCEDURE — 700111 HCHG RX REV CODE 636 W/ 250 OVERRIDE (IP)

## 2022-12-05 PROCEDURE — 99223 1ST HOSP IP/OBS HIGH 75: CPT | Mod: GC | Performed by: HOSPITALIST

## 2022-12-05 PROCEDURE — 700111 HCHG RX REV CODE 636 W/ 250 OVERRIDE (IP): Performed by: EMERGENCY MEDICINE

## 2022-12-05 PROCEDURE — 71045 X-RAY EXAM CHEST 1 VIEW: CPT

## 2022-12-05 PROCEDURE — 0241U HCHG SARS-COV-2 COVID-19 NFCT DS RESP RNA 4 TRGT MIC: CPT

## 2022-12-05 PROCEDURE — 770001 HCHG ROOM/CARE - MED/SURG/GYN PRIV*

## 2022-12-05 PROCEDURE — 93005 ELECTROCARDIOGRAM TRACING: CPT | Performed by: EMERGENCY MEDICINE

## 2022-12-05 PROCEDURE — 700105 HCHG RX REV CODE 258

## 2022-12-05 PROCEDURE — 70450 CT HEAD/BRAIN W/O DYE: CPT

## 2022-12-05 PROCEDURE — 82607 VITAMIN B-12: CPT

## 2022-12-05 PROCEDURE — 81001 URINALYSIS AUTO W/SCOPE: CPT

## 2022-12-05 PROCEDURE — 84439 ASSAY OF FREE THYROXINE: CPT

## 2022-12-05 PROCEDURE — 96365 THER/PROPH/DIAG IV INF INIT: CPT

## 2022-12-05 PROCEDURE — 700102 HCHG RX REV CODE 250 W/ 637 OVERRIDE(OP)

## 2022-12-05 PROCEDURE — 85025 COMPLETE CBC W/AUTO DIFF WBC: CPT

## 2022-12-05 PROCEDURE — 84443 ASSAY THYROID STIM HORMONE: CPT

## 2022-12-05 PROCEDURE — 83605 ASSAY OF LACTIC ACID: CPT

## 2022-12-05 PROCEDURE — C9803 HOPD COVID-19 SPEC COLLECT: HCPCS | Performed by: EMERGENCY MEDICINE

## 2022-12-05 PROCEDURE — 87040 BLOOD CULTURE FOR BACTERIA: CPT

## 2022-12-05 PROCEDURE — 700105 HCHG RX REV CODE 258: Performed by: STUDENT IN AN ORGANIZED HEALTH CARE EDUCATION/TRAINING PROGRAM

## 2022-12-05 PROCEDURE — 84145 PROCALCITONIN (PCT): CPT

## 2022-12-05 PROCEDURE — A9270 NON-COVERED ITEM OR SERVICE: HCPCS

## 2022-12-05 PROCEDURE — 83735 ASSAY OF MAGNESIUM: CPT

## 2022-12-05 PROCEDURE — 87086 URINE CULTURE/COLONY COUNT: CPT

## 2022-12-05 PROCEDURE — 96366 THER/PROPH/DIAG IV INF ADDON: CPT

## 2022-12-05 PROCEDURE — 87186 SC STD MICRODIL/AGAR DIL: CPT

## 2022-12-05 PROCEDURE — 96367 TX/PROPH/DG ADDL SEQ IV INF: CPT

## 2022-12-05 RX ORDER — BENAZEPRIL HYDROCHLORIDE 20 MG/1
10 TABLET ORAL NIGHTLY
COMMUNITY

## 2022-12-05 RX ORDER — LORAZEPAM 2 MG/1
2 TABLET ORAL 2 TIMES DAILY
COMMUNITY

## 2022-12-05 RX ORDER — VITAMIN B COMPLEX
1000 TABLET ORAL DAILY
Status: DISCONTINUED | OUTPATIENT
Start: 2022-12-06 | End: 2022-12-14

## 2022-12-05 RX ORDER — BISACODYL 10 MG
10 SUPPOSITORY, RECTAL RECTAL
Status: DISCONTINUED | OUTPATIENT
Start: 2022-12-05 | End: 2022-12-29 | Stop reason: HOSPADM

## 2022-12-05 RX ORDER — AZITHROMYCIN 500 MG/5ML
500 INJECTION, POWDER, LYOPHILIZED, FOR SOLUTION INTRAVENOUS EVERY 24 HOURS
Status: DISCONTINUED | OUTPATIENT
Start: 2022-12-06 | End: 2022-12-06

## 2022-12-05 RX ORDER — BENAZEPRIL HYDROCHLORIDE 5 MG/1
10 TABLET ORAL NIGHTLY
Status: DISCONTINUED | OUTPATIENT
Start: 2022-12-05 | End: 2022-12-29 | Stop reason: HOSPADM

## 2022-12-05 RX ORDER — POLYETHYLENE GLYCOL 3350 17 G/17G
1 POWDER, FOR SOLUTION ORAL
Status: DISCONTINUED | OUTPATIENT
Start: 2022-12-05 | End: 2022-12-29 | Stop reason: HOSPADM

## 2022-12-05 RX ORDER — ACETAMINOPHEN 325 MG/1
650 TABLET ORAL ONCE
Status: COMPLETED | OUTPATIENT
Start: 2022-12-05 | End: 2022-12-05

## 2022-12-05 RX ORDER — DOCUSATE SODIUM 100 MG/1
100 CAPSULE, LIQUID FILLED ORAL
Status: ON HOLD | COMMUNITY
End: 2022-12-28

## 2022-12-05 RX ORDER — AZITHROMYCIN 500 MG/5ML
500 INJECTION, POWDER, LYOPHILIZED, FOR SOLUTION INTRAVENOUS EVERY 24 HOURS
Status: DISCONTINUED | OUTPATIENT
Start: 2022-12-05 | End: 2022-12-05

## 2022-12-05 RX ORDER — POTASSIUM CHLORIDE 7.45 MG/ML
10 INJECTION INTRAVENOUS
Status: COMPLETED | OUTPATIENT
Start: 2022-12-05 | End: 2022-12-05

## 2022-12-05 RX ORDER — KETOCONAZOLE 20 MG/G
1 CREAM TOPICAL
Status: ON HOLD | COMMUNITY
End: 2022-12-28

## 2022-12-05 RX ORDER — QUETIAPINE FUMARATE 200 MG/1
200 TABLET, FILM COATED ORAL EVERY EVENING
Status: DISCONTINUED | OUTPATIENT
Start: 2022-12-05 | End: 2022-12-29 | Stop reason: HOSPADM

## 2022-12-05 RX ORDER — HEPARIN SODIUM 5000 [USP'U]/ML
5000 INJECTION, SOLUTION INTRAVENOUS; SUBCUTANEOUS EVERY 8 HOURS
Status: DISCONTINUED | OUTPATIENT
Start: 2022-12-05 | End: 2022-12-06

## 2022-12-05 RX ORDER — ACETAMINOPHEN 325 MG/1
650 TABLET ORAL EVERY 6 HOURS PRN
Status: DISCONTINUED | OUTPATIENT
Start: 2022-12-05 | End: 2022-12-09

## 2022-12-05 RX ORDER — TAMSULOSIN HYDROCHLORIDE 0.4 MG/1
0.4 CAPSULE ORAL EVERY EVENING
Status: DISCONTINUED | OUTPATIENT
Start: 2022-12-05 | End: 2022-12-29 | Stop reason: HOSPADM

## 2022-12-05 RX ORDER — SODIUM CHLORIDE, SODIUM LACTATE, POTASSIUM CHLORIDE, AND CALCIUM CHLORIDE .6; .31; .03; .02 G/100ML; G/100ML; G/100ML; G/100ML
1000 INJECTION, SOLUTION INTRAVENOUS ONCE
Status: COMPLETED | OUTPATIENT
Start: 2022-12-05 | End: 2022-12-05

## 2022-12-05 RX ORDER — AZITHROMYCIN 500 MG/1
500 INJECTION, POWDER, LYOPHILIZED, FOR SOLUTION INTRAVENOUS ONCE
Status: COMPLETED | OUTPATIENT
Start: 2022-12-05 | End: 2022-12-05

## 2022-12-05 RX ORDER — TAMSULOSIN HYDROCHLORIDE 0.4 MG/1
0.4 CAPSULE ORAL DAILY
COMMUNITY

## 2022-12-05 RX ORDER — PROCHLORPERAZINE EDISYLATE 5 MG/ML
10 INJECTION INTRAMUSCULAR; INTRAVENOUS EVERY 6 HOURS PRN
Status: DISCONTINUED | OUTPATIENT
Start: 2022-12-05 | End: 2022-12-29 | Stop reason: HOSPADM

## 2022-12-05 RX ORDER — LEVOTHYROXINE SODIUM 88 UG/1
88 TABLET ORAL
Status: DISCONTINUED | OUTPATIENT
Start: 2022-12-06 | End: 2022-12-06

## 2022-12-05 RX ORDER — ASPIRIN 81 MG/1
81 TABLET, CHEWABLE ORAL EVERY EVENING
Status: DISCONTINUED | OUTPATIENT
Start: 2022-12-05 | End: 2022-12-14

## 2022-12-05 RX ORDER — SODIUM CHLORIDE, SODIUM LACTATE, POTASSIUM CHLORIDE, CALCIUM CHLORIDE 600; 310; 30; 20 MG/100ML; MG/100ML; MG/100ML; MG/100ML
INJECTION, SOLUTION INTRAVENOUS CONTINUOUS
Status: DISCONTINUED | OUTPATIENT
Start: 2022-12-05 | End: 2022-12-08

## 2022-12-05 RX ORDER — AMOXICILLIN 250 MG
2 CAPSULE ORAL 2 TIMES DAILY
Status: DISCONTINUED | OUTPATIENT
Start: 2022-12-06 | End: 2022-12-15

## 2022-12-05 RX ORDER — NYSTATIN 100000 [USP'U]/G
POWDER TOPICAL 2 TIMES DAILY
Status: COMPLETED | OUTPATIENT
Start: 2022-12-05 | End: 2022-12-10

## 2022-12-05 RX ORDER — CEFTRIAXONE 2 G/1
2000 INJECTION, POWDER, FOR SOLUTION INTRAMUSCULAR; INTRAVENOUS ONCE
Status: COMPLETED | OUTPATIENT
Start: 2022-12-05 | End: 2022-12-05

## 2022-12-05 RX ORDER — LOVASTATIN 20 MG/1
40 TABLET ORAL NIGHTLY
Status: DISCONTINUED | OUTPATIENT
Start: 2022-12-05 | End: 2022-12-06

## 2022-12-05 RX ADMIN — QUETIAPINE FUMARATE 200 MG: 200 TABLET, FILM COATED ORAL at 20:03

## 2022-12-05 RX ADMIN — HEPARIN SODIUM 5000 UNITS: 5000 INJECTION, SOLUTION INTRAVENOUS; SUBCUTANEOUS at 19:22

## 2022-12-05 RX ADMIN — POTASSIUM CHLORIDE 10 MEQ: 7.46 INJECTION, SOLUTION INTRAVENOUS at 17:25

## 2022-12-05 RX ADMIN — SODIUM CHLORIDE, POTASSIUM CHLORIDE, SODIUM LACTATE AND CALCIUM CHLORIDE: 600; 310; 30; 20 INJECTION, SOLUTION INTRAVENOUS at 16:00

## 2022-12-05 RX ADMIN — NYSTATIN: 100000 POWDER TOPICAL at 20:00

## 2022-12-05 RX ADMIN — BENAZEPRIL HYDROCHLORIDE 10 MG: 20 TABLET ORAL at 20:03

## 2022-12-05 RX ADMIN — POTASSIUM CHLORIDE 10 MEQ: 7.46 INJECTION, SOLUTION INTRAVENOUS at 16:08

## 2022-12-05 RX ADMIN — SODIUM CHLORIDE, POTASSIUM CHLORIDE, SODIUM LACTATE AND CALCIUM CHLORIDE 1000 ML: 600; 310; 30; 20 INJECTION, SOLUTION INTRAVENOUS at 16:15

## 2022-12-05 RX ADMIN — AZITHROMYCIN MONOHYDRATE 500 MG: 500 INJECTION, POWDER, LYOPHILIZED, FOR SOLUTION INTRAVENOUS at 12:38

## 2022-12-05 RX ADMIN — ASPIRIN 81 MG 81 MG: 81 TABLET ORAL at 19:22

## 2022-12-05 RX ADMIN — ACETAMINOPHEN 650 MG: 325 TABLET, FILM COATED ORAL at 12:35

## 2022-12-05 RX ADMIN — CEFTRIAXONE SODIUM 2000 MG: 2 INJECTION, POWDER, FOR SOLUTION INTRAMUSCULAR; INTRAVENOUS at 12:35

## 2022-12-05 RX ADMIN — HEPARIN SODIUM 5000 UNITS: 5000 INJECTION, SOLUTION INTRAVENOUS; SUBCUTANEOUS at 22:35

## 2022-12-05 RX ADMIN — TAMSULOSIN HYDROCHLORIDE 0.4 MG: 0.4 CAPSULE ORAL at 19:22

## 2022-12-05 RX ADMIN — LOVASTATIN 40 MG: 20 TABLET ORAL at 20:03

## 2022-12-05 ASSESSMENT — COGNITIVE AND FUNCTIONAL STATUS - GENERAL
MOVING FROM LYING ON BACK TO SITTING ON SIDE OF FLAT BED: UNABLE
TOILETING: TOTAL
WALKING IN HOSPITAL ROOM: TOTAL
EATING MEALS: A LOT
MOBILITY SCORE: 7
PERSONAL GROOMING: TOTAL
STANDING UP FROM CHAIR USING ARMS: TOTAL
DRESSING REGULAR LOWER BODY CLOTHING: TOTAL
TURNING FROM BACK TO SIDE WHILE IN FLAT BAD: UNABLE
SUGGESTED CMS G CODE MODIFIER DAILY ACTIVITY: CM
DAILY ACTIVITIY SCORE: 7
SUGGESTED CMS G CODE MODIFIER MOBILITY: CM
HELP NEEDED FOR BATHING: TOTAL
MOVING TO AND FROM BED TO CHAIR: A LOT
CLIMB 3 TO 5 STEPS WITH RAILING: TOTAL
DRESSING REGULAR UPPER BODY CLOTHING: TOTAL

## 2022-12-05 ASSESSMENT — ENCOUNTER SYMPTOMS
NAUSEA: 1
BLOOD IN STOOL: 0
WEAKNESS: 1
NECK PAIN: 1
FEVER: 0
ABDOMINAL PAIN: 1
WHEEZING: 0
FEVER: 1
EYES NEGATIVE: 1
CARDIOVASCULAR NEGATIVE: 1
CONSTIPATION: 1
NAUSEA: 0
FALLS: 1
CHILLS: 0
VOMITING: 0
CHILLS: 1
FLANK PAIN: 1
NEUROLOGICAL NEGATIVE: 1
FOCAL WEAKNESS: 0
DIARRHEA: 0
ABDOMINAL PAIN: 0
PSYCHIATRIC NEGATIVE: 1
SHORTNESS OF BREATH: 0
SPUTUM PRODUCTION: 0
COUGH: 1

## 2022-12-05 ASSESSMENT — LIFESTYLE VARIABLES
TOTAL SCORE: 0
ALCOHOL_USE: YES
TOTAL SCORE: 0
EVER FELT BAD OR GUILTY ABOUT YOUR DRINKING: NO
DOES PATIENT WANT TO STOP DRINKING: NO
TOTAL SCORE: 0
EVER HAD A DRINK FIRST THING IN THE MORNING TO STEADY YOUR NERVES TO GET RID OF A HANGOVER: NO
HAVE YOU EVER FELT YOU SHOULD CUT DOWN ON YOUR DRINKING: NO
CONSUMPTION TOTAL: INCOMPLETE
HAVE PEOPLE ANNOYED YOU BY CRITICIZING YOUR DRINKING: NO

## 2022-12-05 ASSESSMENT — PAIN DESCRIPTION - PAIN TYPE: TYPE: ACUTE PAIN

## 2022-12-05 ASSESSMENT — PATIENT HEALTH QUESTIONNAIRE - PHQ9
2. FEELING DOWN, DEPRESSED, IRRITABLE, OR HOPELESS: NOT AT ALL
SUM OF ALL RESPONSES TO PHQ9 QUESTIONS 1 AND 2: 0
1. LITTLE INTEREST OR PLEASURE IN DOING THINGS: NOT AT ALL

## 2022-12-05 ASSESSMENT — FIBROSIS 4 INDEX: FIB4 SCORE: 4.14

## 2022-12-05 NOTE — PROGRESS NOTES
"SENIOR ADMIT NOTE    Mr. Lewis is a 82 YOM with PMHx of HTN, hypothyroidism, dementia presenting by EMS on 12/5 after multiple GLFs over past few days.     Pt is poor historian due to presumed dementia, some collateral information was obtained from his niece. Pt has been feeling weaker than usual but was unable to clarify for how long. He also reports associated cough and rhinorrhea for 4-5 days. He additionally reports L hip pain since his fall (however, during the same conversation, he denied active L hip pain). He is unable to recall if he hit his head or ever lost consciousness. When asking pt's niece, she reports that they live together and that she works night shifts. She has not witnessed either of his falls but has heard him falling. She reports that it appears to usually be when getting up.     In the ED, initial vitals were significant for fever and possible hypoxia; however, upon my examination, vitals were stable. Labs were significant for K 3.3, Cr 1.6 (appears close to baseline), procalcitonin 0.46, UA positive for leukocyte esterase, nitrates, WBC, hyaline casts, as well as epithelial cells (will need repeat UA). Troponin 63, EKG showed normal sinus tachycardia with trigeminy. CXR, on my read, was concerning for RLL dense atelectasis vs early infiltrate. L hip X-ray negative for fracture. CT head negative for any acute abnormalities.    Exam:  BP (!) 145/67   Pulse 91   Temp 37.6 °C (99.6 °F) (Oral)   Resp (!) 32   Ht 1.753 m (5' 9\")   Wt 99.8 kg (220 lb)   SpO2 96%   BMI 32.49 kg/m²     Physical Exam  Vitals reviewed.   Constitutional:       General: He is in acute distress.      Appearance: Normal appearance. He is obese. He is not ill-appearing.   HENT:      Head: Normocephalic and atraumatic.      Ears:      Comments: Very hard of hearing     Mouth/Throat:      Mouth: Mucous membranes are dry.   Eyes:      Extraocular Movements: Extraocular movements intact.      Conjunctiva/sclera: " Conjunctivae normal.      Pupils: Pupils are equal, round, and reactive to light.   Cardiovascular:      Rate and Rhythm: Normal rate and regular rhythm.      Heart sounds: Normal heart sounds. No murmur heard.    No friction rub. No gallop.   Pulmonary:      Effort: No respiratory distress.      Breath sounds: Rhonchi (RLL) present. No wheezing or rales.   Abdominal:      General: Bowel sounds are normal. There is no distension.      Palpations: Abdomen is soft.      Tenderness: There is no abdominal tenderness.   Musculoskeletal:         General: Tenderness (L hip) present.      Right lower leg: No edema.      Left lower leg: No edema.   Skin:     General: Skin is dry.      Findings: No rash.      Comments: Very dry skin over R toes, appears to be contact-related   Neurological:      Mental Status: He is alert. Mental status is at baseline.      Cranial Nerves: Cranial nerve deficit (mild L facial droop, and mild R CN III palsy) present.      Sensory: No sensory deficit.      Motor: No weakness.      Comments: A&O x2 (self and reason here, does not know time or location)   Psychiatric:         Mood and Affect: Mood normal.         Behavior: Behavior normal.         Labs:  Recent Labs     12/05/22  1200   WBC 8.9   RBC 3.79*   HEMOGLOBIN 13.3*   HEMATOCRIT 38.8*   .4*   MCH 35.1*   RDW 55.2*   PLATELETCT 93*   MPV 10.6   NEUTSPOLYS 77.10*   LYMPHOCYTES 13.10*   MONOCYTES 8.70   EOSINOPHILS 0.30   BASOPHILS 0.30     Recent Labs     12/05/22  1200   SODIUM 142   POTASSIUM 3.3*   CHLORIDE 107   CO2 24   GLUCOSE 113*   BUN 15     Recent Labs     12/05/22  1200   ASTSGOT 22   ALTSGPT 22   TBILIRUBIN 1.3   ALKPHOSPHAT 123*   GLOBULIN 2.6         DX-HIP-UNILATERAL-WITH PELVIS-1 VIEW LEFT   Final Result      No acute osseous abnormality. Left hip arthroplasty is well seated.      DX-CHEST-PORTABLE (1 VIEW)   Final Result      Chronic elevation of the right hemidiaphragm with right basilar opacity, likely  atelectasis.         CT-HEAD W/O   Final Result      1. No CT evidence of acute infarct, hemorrhage or mass.   2. Mild to moderate global parenchymal atrophy. Chronic small vessel ischemic changes.   3. Mild to moderate ethmoid sinus disease.      MR-BRAIN-W/O    (Results Pending)       Assessment and Plan:  #CAP  - CXR concerning for possible RLL infiltrate  - PE positive for RLL rhonci  - Treat with C3 and Azithromycin  - F/u on BCx x 2, procalcitonin elevated  - Troponin slightly elevated, trend troponin (likely demand ischemia from infection and recent fall)    #Weakness  #L facial droop  - CT head negative but mild L facial droop (unclear if knew)  - MRI brain w/o contrast pending  - PT/OT/SLP consult  - NPO for now  - Seizure, fall, and aspiration precautions    #UTI  #Dehydration  - UA positive for leukocyte esterase and nitrates with bacteria and hyaline casts  - Repeat UA due to epithelial cells present  - Denies any urinary symptoms but pt also A&O x2  - Treat with C3 + Azithromycin  - Start LR    For further details , please refer to H&P by Dr. Estrella.

## 2022-12-05 NOTE — ED PROVIDER NOTES
ED Provider Note    Primary care provider: Matias Chun M.D.  Means of arrival: EMS  History obtained from: patient  History limited by: none    CHIEF COMPLAINT  Chief Complaint   Patient presents with    Fall     BIB EMS from home for 2 falls over the last couple days, hx of dementia, pt is poor historian, has some family that checks on him, c/o left hip pain       HPI  Sina Lewis is a 82 y.o. male who presents to the Emergency Department for evaluation of weakness and falls.  Patient reports for the last 2 days he has been feeling generally weak and unwell.  He reports that he has fallen out of bed a couple of times.  He did injure his left hip secondary to the fall causing him decreased ability to ambulate.  He describes the pain as a pressure-like pain in his left hip.  He also is uncertain if he hit his head or loss consciousness.  He denies cough, fevers, chills, chest pain, shortness of breath or abdominal pain.  Patient is overall a poor historian and cannot provide much past medical history.  Per record review has not been seen in our system since 2017.  Patient was noted to be hypoxic on arrival at 88%, does not normally wear oxygen at baseline.    REVIEW OF SYSTEMS  Review of Systems   Constitutional:  Positive for malaise/fatigue. Negative for chills and fever.   Respiratory:  Negative for shortness of breath.    Cardiovascular:  Negative for chest pain.   Gastrointestinal:  Negative for abdominal pain, nausea and vomiting.   Neurological:  Positive for weakness. Negative for focal weakness.   All other systems reviewed and are negative.      PAST MEDICAL HISTORY   has a past medical history of Abdominal pain, bilateral lower quadrant, Acute MI, Arrhythmia, Arthritis, Backpain, Bronchitis, Congestive heart failure (CMS-HCC), Diverticulitis, Heart murmur, Hypertension, Pneumonia, Psychiatric problem, and Thyroid disorder.    SURGICAL HISTORY   has a past surgical history that includes other  "cardiac surgery and hernia repair.    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  Physical Exam  Nursing note and vitals reviewed.  Constitutional: Well-developed and well-nourished. No acute distress.   HENT: Head is normocephalic and atraumatic.  Eyes: extra-ocular movements intact  Cardiovascular: regular rate and  regular rhythm. No murmur heard.  Pulmonary/Chest: Breath sounds normal. No wheezes or rales.   Abdominal: Soft with mild suprapubic tenderness, no rebound or guarding.  Easily reducible hernias  Musculoskeletal: Extremities exhibit normal range of motion without edema or tenderness.   Neurological: Awake and alert  Skin: Skin is warm and dry. No rash.         DIAGNOSTIC STUDIES /  LABS  Labs Reviewed   CBC WITH DIFFERENTIAL - Abnormal; Notable for the following components:       Result Value    RBC 3.79 (*)     Hemoglobin 13.3 (*)     Hematocrit 38.8 (*)     .4 (*)     MCH 35.1 (*)     RDW 55.2 (*)     Platelet Count 93 (*)     Neutrophils-Polys 77.10 (*)     Lymphocytes 13.10 (*)     All other components within normal limits    Narrative:     Biotin intake of greater than 5 mg per day may interfere with  troponin levels, causing " false low values.   COMP METABOLIC PANEL - Abnormal; Notable for the following components:    Potassium 3.3 (*)     Glucose 113 (*)     Creatinine 1.60 (*)     Alkaline Phosphatase 123 (*)     All other components within normal limits    Narrative:     Biotin intake of greater than 5 mg per day may interfere with  troponin levels, causing false low values.   URINALYSIS - Abnormal; Notable for the following components:    Protein 30 (*)     Nitrite Positive (*)     Leukocyte Esterase Large (*)     Occult Blood Moderate (*)     All other components within normal limits    Narrative:     Indication for culture:->Emergency Room Patient   TROPONIN - Abnormal; Notable for the following components:    Troponin T 63 (*)     All other components within normal limits    Narrative:     Biotin intake of greater than 5 mg per day may interfere with  troponin levels, causing false low values.   PROCALCITONIN - Abnormal; Notable for the following components:    Procalcitonin 0.46 (*)     All other components within normal limits    Narrative:     Biotin intake of greater than 5 mg per day may interfere with  troponin levels, causing false low values.   ESTIMATED GFR - Abnormal; Notable for the following components:    GFR (CKD-EPI) 43 (*)     All other components within normal limits    Narrative:     Biotin intake of greater than 5 mg per day may interfere with  troponin levels, causing false low values.   URINE MICROSCOPIC (W/UA) - Abnormal; Notable for the following components:    -150 (*)     RBC 10-20 (*)     Bacteria Moderate (*)     Epithelial Cells Moderate (*)     Hyaline Cast 3-5 (*)     All other components within normal limits    Narrative:     Indication for culture:->Emergency Room Patient   TROPONIN - Abnormal; Notable for the following components:    Troponin T 66 (*)     All other components within normal limits    Narrative:     Biotin intake of greater than 5 mg per day may interfere with  troponin  "levels, causing false low values.   TSH WITH REFLEX TO FT4 - Abnormal; Notable for the following components:    TSH 0.230 (*)     All other components within normal limits    Narrative:     Biotin intake of greater than 5 mg per day may interfere with  troponin levels, causing false low values.   LACTIC ACID    Narrative:     Biotin intake of greater than 5 mg per day may interfere with  troponin levels, causing false low values.   COV-2, FLU A/B, AND RSV BY PCR (CEPHEID)   VITAMIN B12    Narrative:     Biotin intake of greater than 5 mg per day may interfere with  troponin levels, causing false low values.   URINE CULTURE(NEW)    Narrative:     Indication for culture:->Emergency Room Patient   BLOOD CULTURE    Narrative:     Per Hospital Policy: Only change Specimen Src: to \"Line\" if  specified by physician order.   BLOOD CULTURE    Narrative:     Per Hospital Policy: Only change Specimen Src: to \"Line\" if  specified by physician order.   URINE CULTURE(NEW)   FREE THYROXINE    Narrative:     Biotin intake of greater than 5 mg per day may interfere with  troponin levels, causing false low values.   TROPONIN       All labs reviewed by me.    EKG Interpretation:  Interpreted by myself    12 Lead EKG interpreted by me to show:  EKG at 12:10 PM: Normal sinus rhythm with frequent PVCs, heart rate 93, left axis deviation, intervals notable for prolonged WY of 259 with associated first-degree AV block, QRS 97, QTc 466, no acute ST-T segment changes, no evidence of acute arrhythmia or ischemia  My impression of this EKG: Does not indicate ischemia or arrhythmia at this time.    RADIOLOGY  DX-HIP-UNILATERAL-WITH PELVIS-1 VIEW LEFT   Final Result      No acute osseous abnormality. Left hip arthroplasty is well seated.      DX-CHEST-PORTABLE (1 VIEW)   Final Result      Chronic elevation of the right hemidiaphragm with right basilar opacity, likely atelectasis.         CT-HEAD W/O   Final Result      1. No CT evidence of acute " infarct, hemorrhage or mass.   2. Mild to moderate global parenchymal atrophy. Chronic small vessel ischemic changes.   3. Mild to moderate ethmoid sinus disease.        The radiologist's interpretation of all radiological studies have been reviewed by me.        COURSE & MEDICAL DECISION MAKING  Nursing notes, VS, PMSFHx reviewed in chart.    Patient is a 82-year-old male who presents for evaluation of weakness and frequent falls.  Differential diagnosis includes infection, pneumonia, urinary tract infection, bacteremia, intracranial pathology, hip fracture versus strain.  On arrival patient is febrile with heart rate greater than 90, therefore sepsis protocol is initiated. Patient has CHF listed in his medical problems and therefore sepsis fluid protocol is not initiated.  As he is hypoxic I suspect possible pneumonia, he also has some suprapubic tenderness and may have UTI and therefore he is empirically started on ceftriaxone and azithromycin.      Patient's initial vitals notable for fever, he is treated with Tylenol for his fever.  EKG returns demonstrates no evidence of acute arrhythmia or ischemia.  CT of his head returns and demonstrates no acute intracranial pathology.  Chest x-ray is consistent with right basilar opacity, likely pneumonia given his fever and hypoxemia.  Viral swabs are negative.  Labs also notable for elevated procalcitonin and troponin.  EKG is nonischemic and patient is not having chest pain, elevated troponin likely secondary to infection.  Urinalysis is consistent with infection.  Pelvic x-ray returns demonstrates no evidence of acute fracture or dislocation.  At this time patient will be hospitalized for ongoing management of infection and weakness and hypoxemia.  Discussed the case with [facility] internal medicine team is excepted patient for hospitalization.  Patient is in guarded condition.      FINAL IMPRESSION  1. Weakness    2. Fall, initial encounter    3. Pneumonia of right lung  due to infectious organism, unspecified part of lung    4. Hypoxemia    5. Urinary tract infection without hematuria, site unspecified

## 2022-12-05 NOTE — H&P
"[facility] Internal Medicine History & Physical Note    Date of Service  12/5/2022    [facility] Team: [facility] Team   Attending: ROCIO Hills M.d.  Senior Resident: Dr. Lenz   Intern:  Dr. Estrella  Contact Number: [phone]    Primary Care Physician  Matias Chun M.D.    Consultants  None    Code Status  Full Code    Chief Complaint  Chief Complaint   Patient presents with    Fall     BIB EMS from home for 2 falls over the last couple days, hx of dementia, pt is poor historian, has some family that checks on him, c/o left hip pain       History of Presenting Illness (HPI):   Sina Lewis is a 82 y.o. male who was brought by EMS on 12/5/2022 due to multiple GLF #4-5. Patient describes that he has been feeling weak and that his legs \"are not working\" so he has been having multiple unwitnessed falls and is unable to get up by himself, during this episodes he describes no dizziness, no lightheadedness, no loss of consciousness and no abnormal/involuntary movements. He describes that since around 1 month he has been having to \"hold\" on things to ambulate, which is not his baseline mobilization status, although he denies focal weakness. He report he did hit his head and shoulders when falling. He reports subjective fever and dry cough since 1 week ago, no nasal congestion, no hoarseness, no sore throat, no shortness of breath, no chest pain. Since few days ago he has been also experiencing increased urinary frequency, urgency and incontinency, no dysuria and no hematuria but has some flank and low abdomen pain. Absent BM since 4 days ago in the setting of chronic constipation.     He has a PMHx of hypertension, hypothyroidism, CKD stage 3, atrial fibrillation (unclear), dyslipidemia and tobacco use. His niece is the primary caregiver, but the patient spends almost the whole day alone, being mostly sedentary but able to ambulate with a walker.     In the ED patient was febrile 38.1, tachypneic, tachycardic 90s, no " hypotensive. Confused in time but no in place or person. CBC remarkable for thrombocytopenia, WBC wnl but left shift, BMP with K 3.3, creatinine 1.6 (possibly baseline), positive troponin and a borderline ST depression in V3-V5.     I discussed the plan of care with family.    Review of Systems  Review of Systems   Constitutional:  Positive for chills, fever and malaise/fatigue.   HENT: Negative.     Eyes: Negative.    Respiratory:  Positive for cough. Negative for sputum production and wheezing.    Cardiovascular: Negative.    Gastrointestinal:  Positive for abdominal pain, constipation and nausea. Negative for blood in stool, diarrhea and melena.   Genitourinary:  Positive for flank pain, frequency and urgency. Negative for dysuria and hematuria.   Musculoskeletal:  Positive for falls, joint pain and neck pain.        Right knee pain    Skin:  Negative for itching and rash.   Neurological: Negative.    Endo/Heme/Allergies: Negative.    Psychiatric/Behavioral: Negative.       Past Medical History   has a past medical history of Abdominal pain, bilateral lower quadrant, Acute MI, Arrhythmia, Arthritis, Backpain, Bronchitis, Congestive heart failure (CMS-HCC), Diverticulitis, Heart murmur, Hypertension, Pneumonia, Psychiatric problem, and Thyroid disorder.    Surgical History   has a past surgical history that includes other cardiac surgery and hernia repair.     Family History  family history is not on file.   Family history reviewed with patient.     Social History  Tobacco: No   Alcohol: No   Recreational drugs (illegal or prescription): No   Employment: [employer]  Living Situation: With niece  Recent Travel: No    Allergies  Allergies   Allergen Reactions    Nkda [No Known Drug Allergy]        Medications  Prior to Admission Medications   Prescriptions Last Dose Informant Patient Reported? Taking?   ASPIRIN 81 MG PO TABS 12/4/2022 at PM Family Member Yes No   Sig: Take 81 mg by mouth every day.   LORazepam (ATIVAN)  2 MG tablet 12/5/2022 at AM Family Member Yes Yes   Sig: Take 2 mg by mouth 2 times a day. 4 mg in the AM  2 mg in the PM   LOVASTATIN 40 MG PO TABS 12/4/2022 at PM Family Member Yes No   Sig: Take 40 mg by mouth every evening.   VITAMIN D PO 12/5/2022 at AM Family Member Yes Yes   Sig: Take 1 Tablet by mouth every day.   benazepril (LOTENSIN) 20 MG Tab 12/4/2022 at PM Family Member Yes Yes   Sig: Take 10 mg by mouth every evening.   docusate sodium (COLACE) 100 MG Cap UNK at UNK Family Member Yes Yes   Sig: Take 100 mg by mouth every 48 hours.   ketoconazole (NIZORAL) 2 % Cream UNK at UNK Family Member Yes Yes   Sig: Apply 1 Application topically 1 time a day as needed. Apply to feet   levothyroxine (SYNTHROID) 88 MCG Tab 12/5/2022 at AM Family Member Yes No   Sig: Take 88 mcg by mouth every morning on an empty stomach.   omeprazole (PRILOSEC) 40 MG delayed-release capsule 12/5/2022 at AM Family Member Yes No   Sig: Take 40 mg by mouth 2 times a day.   quetiapine (SEROQUEL) 400 MG tablet 12/4/2022 at PM Family Member Yes No   Sig: Take 200 mg by mouth every evening.   tamsulosin (FLOMAX) 0.4 MG capsule 12/4/2022 at PM Family Member Yes Yes   Sig: Take 0.4 mg by mouth every day.      Facility-Administered Medications: None       Physical Exam  Temp:  [37.6 °C (99.6 °F)-38.1 °C (100.6 °F)] 37.6 °C (99.6 °F)  Pulse:  [91-93] 91  Resp:  [20-32] 32  BP: (145)/(67-71) 145/67  SpO2:  [88 %-96 %] 96 %  Blood Pressure : (!) 145/67   Temperature: 37.6 °C (99.6 °F)   Pulse: 91   Respiration: (!) 32   Pulse Oximetry: 96 %       General: Awake, alert and oriented. No in acute distress.   Head and Neck: NC/AT, EOMI, no scleral icterus or conjunctival pallor, no nasal discharge or oral erythema or exudates. Neck supple, no cervical or supraclavicular LAD. Dry oral mucosa.   CV: Rhythmic heart sounds, no murmurs, gallops or rubs. No S3,S4 or JVD. Radial and dorsalis pedis pulses 2+ and equal bilaterally.   Pulm: Chest expansion is  symmetrical, no rales, rhonchi, or wheezing. Has crackles over the right base.   GI: Flat, non distended, soft, nontender, normal bowel sounds, + bilateral flank tenderness with no rebound.   Skin: Warm, erythematous rash over the groin bilateral.   MSK: Normal ROM. No lower extremity edema. Right foot with erythematous dry and scaly rash over the dorsum. Right knee without swelling or tenderness.   Neuro: Patient is alert and oriented x2 (self and place), also he is aware of situation and is clear that he came for falls. Speech is a little slurred, but is fluent. Is able to name, repeat and comprehend. Pupils equal, round and reactive to light. Extra ocular movements intact to me. Facial and body symmetry. Strength 5 out of 5 in upper and lower extremities. Sensitivity intact. Normal deep tendon reflexes. Normal tone. Gait to be evaluated.   Psych: Appropriate mood and affect.      Laboratory:  Recent Labs     12/05/22  1200   WBC 8.9   RBC 3.79*   HEMOGLOBIN 13.3*   HEMATOCRIT 38.8*   .4*   MCH 35.1*   MCHC 34.3   RDW 55.2*   PLATELETCT 93*   MPV 10.6     Recent Labs     12/05/22  1200   SODIUM 142   POTASSIUM 3.3*   CHLORIDE 107   CO2 24   GLUCOSE 113*   BUN 15   CREATININE 1.60*   CALCIUM 9.0     Recent Labs     12/05/22  1200   ALTSGPT 22   ASTSGOT 22   ALKPHOSPHAT 123*   TBILIRUBIN 1.3   GLUCOSE 113*         No results for input(s): NTPROBNP in the last 72 hours.      Recent Labs     12/05/22  1200   TROPONINT 63*       Imaging:  DX-HIP-UNILATERAL-WITH PELVIS-1 VIEW LEFT   Final Result      No acute osseous abnormality. Left hip arthroplasty is well seated.      DX-CHEST-PORTABLE (1 VIEW)   Final Result      Chronic elevation of the right hemidiaphragm with right basilar opacity, likely atelectasis.         CT-HEAD W/O   Final Result      1. No CT evidence of acute infarct, hemorrhage or mass.   2. Mild to moderate global parenchymal atrophy. Chronic small vessel ischemic changes.   3. Mild to moderate  ethmoid sinus disease.      MR-BRAIN-W/O    (Results Pending)       X-Ray:  My impression is: early consolidation vs. Right base dense atelectasis     EXG: per my reading, SR with PVCs- trigemini , extreme left axis deviation (present in previous EKG), 1mm ST depression in V3-V5, no T wave changes, no interval abnl, no hypertrophy.     Assessment/Plan:  Problem Representation:   Mr. Lewis is an 83 yo male with PMHx significant for HTN, HLD, CKD and hypothyroidism who is admitted with multiple GLF, weakness and SIRS. Suspicion of missed stroke, ACS rule out and UTI +/- pneumonia.     I anticipate this patient will require at least two midnights for appropriate medical management, necessitating inpatient admission because acute weakness and ongoing infectious process    Patient will need a Med/Surg bed on MEDICAL service .  The need is secondary to .    * Frequent falls  Assessment & Plan  + generallized weakness. #5 unwitnessed GLF.   No focal neurologic deficit. Speech is slurred and patient is disoriented in time, unclear baseline. CT head without acute changes other than sinusitis. Possible delirium from ongoing infectious process, however, possible missed stroke facilitating aspiration and UTI. No substance use.   - MRI brain w/oc  - Statin, aspirin, heparin as above  - B12, TSH/T4  - Aspiration precautions   - Fall precautions   - Seizure precautions   - PT/OT eval  - SLP eval   - NPO until SLP eval   - Telemetry    Sepsis (HCC)  Assessment & Plan  SIRS given fever,tachypnea, tachycardia. Also left shift and thrombocytopenia.   Source is likely UTI +/- early aspiration pneumonia. Possible associated RANDAL and type 2 miocardia ischemia.   Blood cultures and urine culture pending. Hemodynamically stable. Responding to IVF.   - IVF -LR bolus and maintenance  - C3G and azithromycin   - Heparin for DVT PP   - Monitor     UTI (urinary tract infection)  Assessment & Plan  Patient with acute LUTS as described +  inflammatory changes in UA, significant piuria and + nitrites. Possible underlying prostate condition and constipation favoring infection. Suspected pyelonephritis given SIRS and flank pain.   - Ceftriaxone  - Urine culture (clean catch)   - I and Os / UO  - Monitor     ACS (acute coronary syndrome) ([facility])  Assessment & Plan  Elevated troponin + borderline ST depression in septal and lateral leads. No chest pain, hemodynamically stable. Possible type 2 MI in the setting of sepsis. He had a LHC in 2010 with diffuse disease in obtuse marginal, LAD 50-60% stenosis. TTE in 2014 showed LV concentric hyperthrophy but normal LVEF.   - Trend troponin  - Continue aspirin, statin  - Heparin PP dose   - Telemetry    Community acquired pneumonia of right lower lobe of lung- (present on admission)  Assessment & Plan  Under the suspicion of missed stroke, possibly he had an aspiration episode and early consolidation vs. dense right basal atelectasis. No hypoxia, + procal with SIRS.   No respiratory distress.   - Continue ceftriaxone   - Continue azithromycin  - Aspiration precautions   - Incentive Spirometry    Hypothyroid- (present on admission)  Assessment & Plan  Last TSH many years ago. Has bee on 88 mcg of LVTX.   - New TSH and T4 as it might be contributing to ongoing presentation    CKD (chronic kidney disease) stage 3, GFR 30-59 ml/min ([facility])- (present on admission)  Assessment & Plan  Stage IIIb, possibly RANDAL on CKD given dehydration.   - IVF - LR   - Monitor       VTE prophylaxis: heparin ppx  Full code  NPO for now   Dispo: TBD

## 2022-12-05 NOTE — ED NOTES
Med Rec completed per patient's family ([place] [phone])  Allergies reviewed  No ORAL antibiotics in last 30 days

## 2022-12-06 PROBLEM — R41.0 DELIRIUM: Status: ACTIVE | Noted: 2022-12-06

## 2022-12-06 PROBLEM — K59.04 CHRONIC IDIOPATHIC CONSTIPATION: Status: ACTIVE | Noted: 2022-12-06

## 2022-12-06 PROBLEM — R74.8 ELEVATED CPK: Status: RESOLVED | Noted: 2022-12-05 | Resolved: 2022-12-06

## 2022-12-06 PROBLEM — I21.A1 TYPE 2 ACUTE MYOCARDIAL INFARCTION (HCC): Status: ACTIVE | Noted: 2022-12-05

## 2022-12-06 LAB
APTT PPP: 39.4 SEC (ref 24.7–36)
EKG IMPRESSION: NORMAL
INR PPP: 1.18 (ref 0.87–1.13)
PROTHROMBIN TIME: 14.9 SEC (ref 12–14.6)
TROPONIN T SERPL-MCNC: 118 NG/L (ref 6–19)
UFH PPP CHRO-ACNC: 0.39 IU/ML
UFH PPP CHRO-ACNC: <0.1 IU/ML
UFH PPP CHRO-ACNC: <0.1 IU/ML

## 2022-12-06 PROCEDURE — 700101 HCHG RX REV CODE 250

## 2022-12-06 PROCEDURE — A9270 NON-COVERED ITEM OR SERVICE: HCPCS | Performed by: STUDENT IN AN ORGANIZED HEALTH CARE EDUCATION/TRAINING PROGRAM

## 2022-12-06 PROCEDURE — 700105 HCHG RX REV CODE 258: Performed by: STUDENT IN AN ORGANIZED HEALTH CARE EDUCATION/TRAINING PROGRAM

## 2022-12-06 PROCEDURE — 770001 HCHG ROOM/CARE - MED/SURG/GYN PRIV*

## 2022-12-06 PROCEDURE — 93010 ELECTROCARDIOGRAM REPORT: CPT | Performed by: INTERNAL MEDICINE

## 2022-12-06 PROCEDURE — 85520 HEPARIN ASSAY: CPT | Mod: 91

## 2022-12-06 PROCEDURE — 36415 COLL VENOUS BLD VENIPUNCTURE: CPT

## 2022-12-06 PROCEDURE — 84484 ASSAY OF TROPONIN QUANT: CPT

## 2022-12-06 PROCEDURE — 99223 1ST HOSP IP/OBS HIGH 75: CPT | Mod: GC | Performed by: INTERNAL MEDICINE

## 2022-12-06 PROCEDURE — A9270 NON-COVERED ITEM OR SERVICE: HCPCS | Performed by: HOSPITALIST

## 2022-12-06 PROCEDURE — 93005 ELECTROCARDIOGRAM TRACING: CPT

## 2022-12-06 PROCEDURE — 99233 SBSQ HOSP IP/OBS HIGH 50: CPT | Mod: GC | Performed by: HOSPITALIST

## 2022-12-06 PROCEDURE — 700102 HCHG RX REV CODE 250 W/ 637 OVERRIDE(OP)

## 2022-12-06 PROCEDURE — A9270 NON-COVERED ITEM OR SERVICE: HCPCS

## 2022-12-06 PROCEDURE — 700111 HCHG RX REV CODE 636 W/ 250 OVERRIDE (IP)

## 2022-12-06 PROCEDURE — 700102 HCHG RX REV CODE 250 W/ 637 OVERRIDE(OP): Performed by: STUDENT IN AN ORGANIZED HEALTH CARE EDUCATION/TRAINING PROGRAM

## 2022-12-06 PROCEDURE — 700102 HCHG RX REV CODE 250 W/ 637 OVERRIDE(OP): Performed by: HOSPITALIST

## 2022-12-06 PROCEDURE — 92610 EVALUATE SWALLOWING FUNCTION: CPT

## 2022-12-06 PROCEDURE — 85610 PROTHROMBIN TIME: CPT

## 2022-12-06 PROCEDURE — 85730 THROMBOPLASTIN TIME PARTIAL: CPT

## 2022-12-06 PROCEDURE — 700105 HCHG RX REV CODE 258

## 2022-12-06 RX ORDER — HEPARIN SODIUM 1000 [USP'U]/ML
2000 INJECTION, SOLUTION INTRAVENOUS; SUBCUTANEOUS PRN
Status: DISCONTINUED | OUTPATIENT
Start: 2022-12-06 | End: 2022-12-06

## 2022-12-06 RX ORDER — HEPARIN SODIUM 5000 [USP'U]/ML
5000 INJECTION, SOLUTION INTRAVENOUS; SUBCUTANEOUS EVERY 12 HOURS
Status: DISCONTINUED | OUTPATIENT
Start: 2022-12-06 | End: 2022-12-11 | Stop reason: ALTCHOICE

## 2022-12-06 RX ORDER — HEPARIN SODIUM 5000 [USP'U]/100ML
0-30 INJECTION, SOLUTION INTRAVENOUS CONTINUOUS
Status: DISCONTINUED | OUTPATIENT
Start: 2022-12-06 | End: 2022-12-06

## 2022-12-06 RX ORDER — MAGNESIUM SULFATE HEPTAHYDRATE 40 MG/ML
2 INJECTION, SOLUTION INTRAVENOUS ONCE
Status: COMPLETED | OUTPATIENT
Start: 2022-12-06 | End: 2022-12-06

## 2022-12-06 RX ORDER — ATORVASTATIN CALCIUM 40 MG/1
40 TABLET, FILM COATED ORAL EVERY EVENING
Status: DISCONTINUED | OUTPATIENT
Start: 2022-12-06 | End: 2022-12-14

## 2022-12-06 RX ORDER — LEVOTHYROXINE SODIUM 0.07 MG/1
75 TABLET ORAL
Status: DISCONTINUED | OUTPATIENT
Start: 2022-12-07 | End: 2022-12-29 | Stop reason: HOSPADM

## 2022-12-06 RX ORDER — FOLIC ACID 1 MG/1
1 TABLET ORAL DAILY
Status: DISCONTINUED | OUTPATIENT
Start: 2022-12-06 | End: 2022-12-14

## 2022-12-06 RX ORDER — AZITHROMYCIN 250 MG/1
500 TABLET, FILM COATED ORAL DAILY
Status: COMPLETED | OUTPATIENT
Start: 2022-12-06 | End: 2022-12-07

## 2022-12-06 RX ADMIN — SODIUM CHLORIDE, POTASSIUM CHLORIDE, SODIUM LACTATE AND CALCIUM CHLORIDE: 600; 310; 30; 20 INJECTION, SOLUTION INTRAVENOUS at 21:04

## 2022-12-06 RX ADMIN — SODIUM CHLORIDE, POTASSIUM CHLORIDE, SODIUM LACTATE AND CALCIUM CHLORIDE: 600; 310; 30; 20 INJECTION, SOLUTION INTRAVENOUS at 11:34

## 2022-12-06 RX ADMIN — TAMSULOSIN HYDROCHLORIDE 0.4 MG: 0.4 CAPSULE ORAL at 17:06

## 2022-12-06 RX ADMIN — BENAZEPRIL HYDROCHLORIDE 10 MG: 20 TABLET ORAL at 21:02

## 2022-12-06 RX ADMIN — HEPARIN SODIUM 5000 UNITS: 5000 INJECTION, SOLUTION INTRAVENOUS; SUBCUTANEOUS at 17:25

## 2022-12-06 RX ADMIN — HEPARIN SODIUM 5000 UNITS: 5000 INJECTION, SOLUTION INTRAVENOUS; SUBCUTANEOUS at 05:19

## 2022-12-06 RX ADMIN — NYSTATIN: 100000 POWDER TOPICAL at 17:07

## 2022-12-06 RX ADMIN — THIAMINE HYDROCHLORIDE 500 MG: 100 INJECTION, SOLUTION INTRAMUSCULAR; INTRAVENOUS at 21:05

## 2022-12-06 RX ADMIN — AZITHROMYCIN MONOHYDRATE 500 MG: 250 TABLET ORAL at 11:30

## 2022-12-06 RX ADMIN — SENNOSIDES AND DOCUSATE SODIUM 2 TABLET: 50; 8.6 TABLET ORAL at 17:07

## 2022-12-06 RX ADMIN — ASPIRIN 81 MG 81 MG: 81 TABLET ORAL at 17:07

## 2022-12-06 RX ADMIN — ATORVASTATIN CALCIUM 40 MG: 40 TABLET, FILM COATED ORAL at 17:07

## 2022-12-06 RX ADMIN — HEPARIN SODIUM 10.93 UNITS/KG/HR: 5000 INJECTION, SOLUTION INTRAVENOUS at 08:07

## 2022-12-06 RX ADMIN — Medication 1000 UNITS: at 05:18

## 2022-12-06 RX ADMIN — CEFTRIAXONE SODIUM 2 G: 2 INJECTION, POWDER, FOR SOLUTION INTRAMUSCULAR; INTRAVENOUS at 15:42

## 2022-12-06 RX ADMIN — LEVOTHYROXINE SODIUM 88 MCG: 0.09 TABLET ORAL at 05:18

## 2022-12-06 RX ADMIN — MAGNESIUM SULFATE HEPTAHYDRATE 2 G: 40 INJECTION, SOLUTION INTRAVENOUS at 08:21

## 2022-12-06 RX ADMIN — QUETIAPINE FUMARATE 200 MG: 200 TABLET, FILM COATED ORAL at 17:07

## 2022-12-06 RX ADMIN — SENNOSIDES AND DOCUSATE SODIUM 2 TABLET: 50; 8.6 TABLET ORAL at 05:18

## 2022-12-06 RX ADMIN — FOLIC ACID 1 MG: 1 TABLET ORAL at 13:58

## 2022-12-06 RX ADMIN — POLYETHYLENE GLYCOL 3350 1 PACKET: 17 POWDER, FOR SOLUTION ORAL at 14:51

## 2022-12-06 RX ADMIN — NYSTATIN: 100000 POWDER TOPICAL at 05:18

## 2022-12-06 RX ADMIN — THIAMINE HYDROCHLORIDE 500 MG: 100 INJECTION, SOLUTION INTRAMUSCULAR; INTRAVENOUS at 14:53

## 2022-12-06 ASSESSMENT — ENCOUNTER SYMPTOMS
SHORTNESS OF BREATH: 0
NUMBNESS: 0
PARESTHESIAS: 0
HEADACHES: 0
WHEEZING: 0
VERTIGO: 0
DYSPNEA ON EXERTION: 0
DIAPHORESIS: 0
FOCAL WEAKNESS: 0
SORE THROAT: 0
NIGHT SWEATS: 0
DECREASED APPETITE: 0
NECK PAIN: 1
PND: 0
BRUISES/BLEEDS EASILY: 0
VOMITING: 0
DOUBLE VISION: 0
DEPRESSION: 0
BACK PAIN: 1
CONSTIPATION: 1
CHILLS: 0
DIARRHEA: 0
PALPITATIONS: 0
ABDOMINAL PAIN: 1
DIZZINESS: 0
NERVOUS/ANXIOUS: 0
POLYDIPSIA: 0
IRREGULAR HEARTBEAT: 0
NAUSEA: 0
LIGHT-HEADEDNESS: 0
MEMORY LOSS: 1
COUGH: 0
FEVER: 0
POLYPHAGIA: 0
FALLS: 1

## 2022-12-06 ASSESSMENT — PAIN DESCRIPTION - PAIN TYPE: TYPE: ACUTE PAIN

## 2022-12-06 ASSESSMENT — LIFESTYLE VARIABLES
HAVE YOU EVER FELT YOU SHOULD CUT DOWN ON YOUR DRINKING: NO
HOW MANY TIMES IN THE PAST YEAR HAVE YOU HAD 5 OR MORE DRINKS IN A DAY: 0
EVER FELT BAD OR GUILTY ABOUT YOUR DRINKING: NO
TOTAL SCORE: 0
CONSUMPTION TOTAL: NEGATIVE
DOES PATIENT WANT TO STOP DRINKING: NO
EVER HAD A DRINK FIRST THING IN THE MORNING TO STEADY YOUR NERVES TO GET RID OF A HANGOVER: NO
ALCOHOL_USE: YES
AVERAGE NUMBER OF DAYS PER WEEK YOU HAVE A DRINK CONTAINING ALCOHOL: 1
ON A TYPICAL DAY WHEN YOU DRINK ALCOHOL HOW MANY DRINKS DO YOU HAVE: 1
TOTAL SCORE: 0
HAVE PEOPLE ANNOYED YOU BY CRITICIZING YOUR DRINKING: NO
TOTAL SCORE: 0

## 2022-12-06 NOTE — THERAPY
"Speech Language Pathology   Clinical Swallow Evaluation     Patient Name: Sina Lewis  AGE:  82 y.o., SEX:  male  Medical Record #: [id number]  Today's Date: 2022     Precautions  Precautions: Fall Risk, Swallow Precautions ( See Comments)    HPI: Pt is an 81 y/o man presenting on  after multiple GLFs in the past few days. Reports rough and rhinorrhea.No hx SLP in Epic.     CMHx: frequent falls, CKD, community acquired PNA, ACS, sepsis, UTI  PMHx: HN, hypothyroidism, dementia, prediabetic, CAD, HLD    CT Head w/o :  \"1. No CT evidence of acute infarct, hemorrhage or mass.  2. Mild to moderate global parenchymal atrophy. Chronic small vessel ischemic changes.  3. Mild to moderate ethmoid sinus disease.\"    CXR :  \"Chronic elevation of the right hemidiaphragm with right basilar opacity, likely atelectasis.\"    Level of Consciousness: Alert, Awake  Affect/Behavior: Calm, Cooperative  Follows Directives: Yes  Orientation: Self, , General place, Situation  Hearing: Functional hearing (hearing aids not at bedside but pt able to participate appropriately in evaluation)  Vision: Functional vision      Prior Living Situation & Level of Function:  Pt reports living with his niece. Endorses RG/TN diet with dentures in place. Reports eating softer foods when dentures not present in oral cavity. Reported \"I used to get heartburn but it stopped all of a sudden.\" Not a current concern.       Oral Mechanism Evaluation  Facial Symmetry: Equal  Facial Sensation: Equal  Labial Observations: WFL  Lingual Observations: Midline, Xerostomia  Dentition: Edentulous  Comments: Pt reports niece could bring dentures if needed      Voice  Quality: WFL, Gravely  Resonance: WFL  Intensity: Appropriate  Cough: WFL  Comments:      Current Method of Nutrition   NPO until cleared by speech pathology      Subjective  Pt agreeable and cooperative with all SLP evaluation tasks. Pt states that with his dentures out \"I do okay with " "like hamburgers but steak? Lamb chops? Forget about it.\"    RN reports that CSE was ordered due to concern for CVA last night. She reports that pt has been taking PO meds with liquid wash without difficulty.       Assessment  Positioning: Cotton's (60-90 degrees)  Bolus Administration: SLP and Patient  Oxygen Requirements:  2 L Nasal Cannula  Factor(s) Affecting Performance: None    Swallowing Trials  Ice: WFL  Thin Liquid (TN0): WFL  Liquidised (LQ3): WFL  Regular (RG7): Impaired    Comments:  Pt w/ appropriate self-feeding. Appropriate oral bolus stripping. Intermittent mild spillage of TN0 liquids from labial border; pt sensate. Good lingual manipulation of bolus w/o residue appreciated across consistencies trialed, presumed total AP transit and effective bolus formation. Total but prolonged mastication of RG trials. Pt reported \"I have to really chew it up.\" Use of liquid wash to clear. No overt s/sx of aspiration noted w/ single and sequential sips including 3 oz test. No increase in WOB, no cough/clear, no change in vocal quality. One swallow appreciated per bolus.        Clinical Impressions  Pt presents without s/sx concerning for pharyngeal dysphagia this date. Oral phase dysphagia, evidenced by impaired mastication of RG solids and intermittent labial spillage with straw sips, is likely due to edentulous state. Recommend initiation of SB/TN diet with PO medication administration. SLP will follow to monitor lung status and trial regular solids when pt's dentures are present.     Recommendations  1.  Soft and bite-sized solids with thin liquids  2.  No instrumentation indicated at this time  3.  Swallowing Instructions & Precautions:   Supervision: Independent  Positioning: HOB at 90* or up in chair as tolerated  Medication: With liquid wash  Strategies: Small bites/sips, slow rate of intake  Oral Care: BID      Plan    Recommend Speech Therapy 2 times per week until therapy goals are met for the following " "treatments:  Dysphagia Training and Patient / Family / Caregiver Education.    Discharge Recommendations: Anticipate that the patient will have no further speech therapy needs after discharge from the hospital       Objective   12/06/22 0858   Initial Contact Note    Initial Contact Note  Order Received and Verified, Speech Therapy Evaluation in Progress with Full Report to Follow.   Vitals   O2 (LPM) 2   O2 Delivery Device Silicone Nasal Cannula   Prior Living Situation   Prior Services None   Lives with - Patient's Self Care Capacity Other (Comments)  (Niece)   Prior Level Of Function   Communication Within Functional Limits   Swallow Within Functional Limits   Dentition Edentulous   Dentures Uppers;Lowers   Patient's Primary Language English   Dysphagia Rating   Nutritional Liquid Intake Rating Scale Non thickened beverages   Nutritional Food Intake Rating Scale Total oral diet with multiple consistencies but requiring special preparation or compensations   Patient / Family Goals   Patient / Family Goal #1 \"I do okay with hamburgers but steak? Forget about it.\"   Short Term Goals   Short Term Goal # 1 Pt will consume diet of SB6/TN0 with no overt s/sx of aspiration or worsening of lung status.   Education Group   Education Provided Dysphagia;Role of Speech Therapy   Problem List   Problem List Dysphagia   Interdisciplinary Plan of Care Collaboration   Collaboration Comments RN updated     "

## 2022-12-06 NOTE — CARE PLAN
The patient is Stable - Low risk of patient condition declining or worsening         Progress made toward(s) clinical / shift goals:  PT check in to room, denies complaint     Patient is not progressing towards the following goals:

## 2022-12-06 NOTE — PROGRESS NOTES
0600: This RN notified resident on call, Dr Vergara, of critical value, troponin, of 118. Lab value was read back and verified.

## 2022-12-06 NOTE — PROGRESS NOTES
PT in room 170 from ED Red 6  Attempted to call family to complete admission assessment/MRI screen - no answer  Redness to bilateral groin and to RT foot - pictures uploaded  PT has wallet and t-shirt - no cards or cash  PT state that he uses dentures and hearing aids - not presant with PT states that he did not bring them

## 2022-12-06 NOTE — PROGRESS NOTES
4 Eyes Skin Assessment Completed by Miguel Angel RODRIGUEZ, RN and JANINE Schulte.    Head WDL  Ears WDL  Nose WDL  Mouth WDL  Neck WDL  Breast/Chest WDL  Shoulder Blades WDL  Spine WDL  (R) Arm/Elbow/Hand WDL  (L) Arm/Elbow/Hand WDL  Abdomen WDL  Groin Rash  Scrotum/Coccyx/Buttocks Redness  (R) Leg WDL  (L) Leg WDL  (R) Heel/Foot/Toe Rash  (L) Heel/Foot/Toe WDL          Devices In Places Blood Pressure Cuff and Pulse Ox      Interventions In Place NC W/Ear Foams, Low Air Loss Mattress, and Barrier Cream    Possible Skin Injury Yes    Pictures Uploaded Into Epic Yes  Wound Consult Placed Yes  RN Wound Prevention Protocol Ordered Yes

## 2022-12-06 NOTE — ASSESSMENT & PLAN NOTE
Elevated troponin with borderline ST depression in septal and lateral leads.  SUZY showed possible AV vegetation.  On comfort care

## 2022-12-07 ENCOUNTER — APPOINTMENT (OUTPATIENT)
Dept: RADIOLOGY | Facility: MEDICAL CENTER | Age: 82
DRG: 871 | End: 2022-12-07
Payer: MEDICARE

## 2022-12-07 ENCOUNTER — APPOINTMENT (OUTPATIENT)
Dept: CARDIOLOGY | Facility: MEDICAL CENTER | Age: 82
DRG: 871 | End: 2022-12-07
Attending: STUDENT IN AN ORGANIZED HEALTH CARE EDUCATION/TRAINING PROGRAM
Payer: MEDICARE

## 2022-12-07 PROBLEM — I63.9 STROKE (CEREBRUM) (HCC): Status: ACTIVE | Noted: 2022-12-07

## 2022-12-07 LAB
ANION GAP SERPL CALC-SCNC: 9 MMOL/L (ref 7–16)
BACTERIA UR CULT: ABNORMAL
BACTERIA UR CULT: ABNORMAL
BASOPHILS # BLD AUTO: 0.3 % (ref 0–1.8)
BASOPHILS # BLD: 0.02 K/UL (ref 0–0.12)
BUN SERPL-MCNC: 15 MG/DL (ref 8–22)
CALCIUM SERPL-MCNC: 8.2 MG/DL (ref 8.5–10.5)
CHLORIDE SERPL-SCNC: 105 MMOL/L (ref 96–112)
CO2 SERPL-SCNC: 23 MMOL/L (ref 20–33)
CREAT SERPL-MCNC: 1.27 MG/DL (ref 0.5–1.4)
EKG IMPRESSION: NORMAL
EOSINOPHIL # BLD AUTO: 0.09 K/UL (ref 0–0.51)
EOSINOPHIL NFR BLD: 1.5 % (ref 0–6.9)
ERYTHROCYTE [DISTWIDTH] IN BLOOD BY AUTOMATED COUNT: 57.1 FL (ref 35.9–50)
GFR SERPLBLD CREATININE-BSD FMLA CKD-EPI: 56 ML/MIN/1.73 M 2
GLUCOSE SERPL-MCNC: 98 MG/DL (ref 65–99)
HCT VFR BLD AUTO: 34 % (ref 42–52)
HGB BLD-MCNC: 11.2 G/DL (ref 14–18)
HIV 1+2 AB+HIV1 P24 AG SERPL QL IA: NORMAL
IMM GRANULOCYTES # BLD AUTO: 0.03 K/UL (ref 0–0.11)
IMM GRANULOCYTES NFR BLD AUTO: 0.5 % (ref 0–0.9)
LV EJECT FRACT  99904: 50
LV EJECT FRACT MOD 2C 99903: 48.26
LV EJECT FRACT MOD 4C 99902: 52.97
LV EJECT FRACT MOD BP 99901: 50.06
LYMPHOCYTES # BLD AUTO: 1.27 K/UL (ref 1–4.8)
LYMPHOCYTES NFR BLD: 21.5 % (ref 22–41)
MAGNESIUM SERPL-MCNC: 2 MG/DL (ref 1.5–2.5)
MCH RBC QN AUTO: 34.5 PG (ref 27–33)
MCHC RBC AUTO-ENTMCNC: 32.9 G/DL (ref 33.7–35.3)
MCV RBC AUTO: 104.6 FL (ref 81.4–97.8)
MONOCYTES # BLD AUTO: 0.7 K/UL (ref 0–0.85)
MONOCYTES NFR BLD AUTO: 11.9 % (ref 0–13.4)
NEUTROPHILS # BLD AUTO: 3.79 K/UL (ref 1.82–7.42)
NEUTROPHILS NFR BLD: 64.3 % (ref 44–72)
NRBC # BLD AUTO: 0 K/UL
NRBC BLD-RTO: 0 /100 WBC
PLATELET # BLD AUTO: 78 K/UL (ref 164–446)
PMV BLD AUTO: 10.7 FL (ref 9–12.9)
POTASSIUM SERPL-SCNC: 3.5 MMOL/L (ref 3.6–5.5)
RBC # BLD AUTO: 3.25 M/UL (ref 4.7–6.1)
SIGNIFICANT IND 70042: ABNORMAL
SITE SITE: ABNORMAL
SODIUM SERPL-SCNC: 137 MMOL/L (ref 135–145)
SOURCE SOURCE: ABNORMAL
T PALLIDUM AB SER QL IA: NORMAL
TROPONIN T SERPL-MCNC: 206 NG/L (ref 6–19)
WBC # BLD AUTO: 5.9 K/UL (ref 4.8–10.8)

## 2022-12-07 PROCEDURE — 93306 TTE W/DOPPLER COMPLETE: CPT | Mod: 26 | Performed by: INTERNAL MEDICINE

## 2022-12-07 PROCEDURE — 86780 TREPONEMA PALLIDUM: CPT

## 2022-12-07 PROCEDURE — 97535 SELF CARE MNGMENT TRAINING: CPT

## 2022-12-07 PROCEDURE — 76705 ECHO EXAM OF ABDOMEN: CPT

## 2022-12-07 PROCEDURE — 36415 COLL VENOUS BLD VENIPUNCTURE: CPT

## 2022-12-07 PROCEDURE — 85025 COMPLETE CBC W/AUTO DIFF WBC: CPT

## 2022-12-07 PROCEDURE — A9270 NON-COVERED ITEM OR SERVICE: HCPCS | Performed by: STUDENT IN AN ORGANIZED HEALTH CARE EDUCATION/TRAINING PROGRAM

## 2022-12-07 PROCEDURE — 99233 SBSQ HOSP IP/OBS HIGH 50: CPT | Mod: GC | Performed by: INTERNAL MEDICINE

## 2022-12-07 PROCEDURE — 83735 ASSAY OF MAGNESIUM: CPT

## 2022-12-07 PROCEDURE — 87389 HIV-1 AG W/HIV-1&-2 AB AG IA: CPT

## 2022-12-07 PROCEDURE — 93306 TTE W/DOPPLER COMPLETE: CPT

## 2022-12-07 PROCEDURE — 80048 BASIC METABOLIC PNL TOTAL CA: CPT

## 2022-12-07 PROCEDURE — 700102 HCHG RX REV CODE 250 W/ 637 OVERRIDE(OP): Performed by: STUDENT IN AN ORGANIZED HEALTH CARE EDUCATION/TRAINING PROGRAM

## 2022-12-07 PROCEDURE — 700111 HCHG RX REV CODE 636 W/ 250 OVERRIDE (IP)

## 2022-12-07 PROCEDURE — A9270 NON-COVERED ITEM OR SERVICE: HCPCS

## 2022-12-07 PROCEDURE — 99233 SBSQ HOSP IP/OBS HIGH 50: CPT | Mod: GC | Performed by: HOSPITALIST

## 2022-12-07 PROCEDURE — 97166 OT EVAL MOD COMPLEX 45 MIN: CPT

## 2022-12-07 PROCEDURE — 700105 HCHG RX REV CODE 258

## 2022-12-07 PROCEDURE — 84484 ASSAY OF TROPONIN QUANT: CPT

## 2022-12-07 PROCEDURE — 770001 HCHG ROOM/CARE - MED/SURG/GYN PRIV*

## 2022-12-07 PROCEDURE — 700102 HCHG RX REV CODE 250 W/ 637 OVERRIDE(OP)

## 2022-12-07 PROCEDURE — 97162 PT EVAL MOD COMPLEX 30 MIN: CPT

## 2022-12-07 PROCEDURE — 93010 ELECTROCARDIOGRAM REPORT: CPT | Performed by: INTERNAL MEDICINE

## 2022-12-07 PROCEDURE — 93005 ELECTROCARDIOGRAM TRACING: CPT

## 2022-12-07 PROCEDURE — 700105 HCHG RX REV CODE 258: Performed by: STUDENT IN AN ORGANIZED HEALTH CARE EDUCATION/TRAINING PROGRAM

## 2022-12-07 PROCEDURE — 87040 BLOOD CULTURE FOR BACTERIA: CPT

## 2022-12-07 PROCEDURE — 93356 MYOCRD STRAIN IMG SPCKL TRCK: CPT | Performed by: INTERNAL MEDICINE

## 2022-12-07 RX ORDER — POTASSIUM CHLORIDE 20 MEQ/1
20 TABLET, EXTENDED RELEASE ORAL 3 TIMES DAILY
Status: COMPLETED | OUTPATIENT
Start: 2022-12-07 | End: 2022-12-07

## 2022-12-07 RX ORDER — AMOXICILLIN AND CLAVULANATE POTASSIUM 875; 125 MG/1; MG/1
1 TABLET, FILM COATED ORAL EVERY 12 HOURS
Status: COMPLETED | OUTPATIENT
Start: 2022-12-07 | End: 2022-12-11

## 2022-12-07 RX ORDER — CLOPIDOGREL BISULFATE 75 MG/1
75 TABLET ORAL DAILY
Status: DISCONTINUED | OUTPATIENT
Start: 2022-12-07 | End: 2022-12-14

## 2022-12-07 RX ADMIN — POTASSIUM CHLORIDE 20 MEQ: 1500 TABLET, EXTENDED RELEASE ORAL at 11:52

## 2022-12-07 RX ADMIN — POTASSIUM CHLORIDE 20 MEQ: 1500 TABLET, EXTENDED RELEASE ORAL at 17:28

## 2022-12-07 RX ADMIN — HEPARIN SODIUM 5000 UNITS: 5000 INJECTION, SOLUTION INTRAVENOUS; SUBCUTANEOUS at 05:29

## 2022-12-07 RX ADMIN — THIAMINE HYDROCHLORIDE 500 MG: 100 INJECTION, SOLUTION INTRAMUSCULAR; INTRAVENOUS at 14:40

## 2022-12-07 RX ADMIN — ASPIRIN 81 MG 81 MG: 81 TABLET ORAL at 17:28

## 2022-12-07 RX ADMIN — CLOPIDOGREL BISULFATE 75 MG: 75 TABLET ORAL at 17:28

## 2022-12-07 RX ADMIN — BENAZEPRIL HYDROCHLORIDE 10 MG: 20 TABLET ORAL at 20:07

## 2022-12-07 RX ADMIN — LEVOTHYROXINE SODIUM 75 MCG: 0.07 TABLET ORAL at 05:28

## 2022-12-07 RX ADMIN — THIAMINE HYDROCHLORIDE 500 MG: 100 INJECTION, SOLUTION INTRAMUSCULAR; INTRAVENOUS at 20:09

## 2022-12-07 RX ADMIN — NYSTATIN: 100000 POWDER TOPICAL at 05:29

## 2022-12-07 RX ADMIN — SENNOSIDES AND DOCUSATE SODIUM 2 TABLET: 50; 8.6 TABLET ORAL at 17:28

## 2022-12-07 RX ADMIN — METOPROLOL TARTRATE 12.5 MG: 25 TABLET, FILM COATED ORAL at 18:25

## 2022-12-07 RX ADMIN — SODIUM CHLORIDE, POTASSIUM CHLORIDE, SODIUM LACTATE AND CALCIUM CHLORIDE: 600; 310; 30; 20 INJECTION, SOLUTION INTRAVENOUS at 17:27

## 2022-12-07 RX ADMIN — SENNOSIDES AND DOCUSATE SODIUM 2 TABLET: 50; 8.6 TABLET ORAL at 05:28

## 2022-12-07 RX ADMIN — ATORVASTATIN CALCIUM 40 MG: 40 TABLET, FILM COATED ORAL at 17:28

## 2022-12-07 RX ADMIN — TAMSULOSIN HYDROCHLORIDE 0.4 MG: 0.4 CAPSULE ORAL at 17:28

## 2022-12-07 RX ADMIN — Medication 1000 UNITS: at 05:28

## 2022-12-07 RX ADMIN — PROCHLORPERAZINE EDISYLATE 10 MG: 5 INJECTION INTRAMUSCULAR; INTRAVENOUS at 15:53

## 2022-12-07 RX ADMIN — FOLIC ACID 1 MG: 1 TABLET ORAL at 05:28

## 2022-12-07 RX ADMIN — POTASSIUM CHLORIDE 20 MEQ: 1500 TABLET, EXTENDED RELEASE ORAL at 08:05

## 2022-12-07 RX ADMIN — AMOXICILLIN AND CLAVULANATE POTASSIUM 1 TABLET: 875; 125 TABLET, FILM COATED ORAL at 11:52

## 2022-12-07 RX ADMIN — THIAMINE HYDROCHLORIDE 500 MG: 100 INJECTION, SOLUTION INTRAMUSCULAR; INTRAVENOUS at 08:09

## 2022-12-07 RX ADMIN — QUETIAPINE FUMARATE 200 MG: 200 TABLET, FILM COATED ORAL at 17:28

## 2022-12-07 RX ADMIN — AMOXICILLIN AND CLAVULANATE POTASSIUM 1 TABLET: 875; 125 TABLET, FILM COATED ORAL at 17:33

## 2022-12-07 RX ADMIN — AZITHROMYCIN MONOHYDRATE 500 MG: 250 TABLET ORAL at 11:52

## 2022-12-07 RX ADMIN — HEPARIN SODIUM 5000 UNITS: 5000 INJECTION, SOLUTION INTRAVENOUS; SUBCUTANEOUS at 17:28

## 2022-12-07 RX ADMIN — SODIUM CHLORIDE, POTASSIUM CHLORIDE, SODIUM LACTATE AND CALCIUM CHLORIDE: 600; 310; 30; 20 INJECTION, SOLUTION INTRAVENOUS at 07:46

## 2022-12-07 RX ADMIN — NYSTATIN: 100000 POWDER TOPICAL at 17:28

## 2022-12-07 ASSESSMENT — PAIN DESCRIPTION - PAIN TYPE
TYPE: ACUTE PAIN

## 2022-12-07 ASSESSMENT — COGNITIVE AND FUNCTIONAL STATUS - GENERAL
MOVING FROM LYING ON BACK TO SITTING ON SIDE OF FLAT BED: A LITTLE
DRESSING REGULAR LOWER BODY CLOTHING: A LITTLE
SUGGESTED CMS G CODE MODIFIER MOBILITY: CK
WALKING IN HOSPITAL ROOM: A LITTLE
CLIMB 3 TO 5 STEPS WITH RAILING: A LITTLE
TURNING FROM BACK TO SIDE WHILE IN FLAT BAD: A LITTLE
STANDING UP FROM CHAIR USING ARMS: A LITTLE
MOVING TO AND FROM BED TO CHAIR: A LITTLE
DAILY ACTIVITIY SCORE: 20
MOBILITY SCORE: 18
TOILETING: A LITTLE
HELP NEEDED FOR BATHING: A LITTLE
SUGGESTED CMS G CODE MODIFIER DAILY ACTIVITY: CJ
DRESSING REGULAR UPPER BODY CLOTHING: A LITTLE

## 2022-12-07 ASSESSMENT — ACTIVITIES OF DAILY LIVING (ADL): TOILETING: INDEPENDENT

## 2022-12-07 ASSESSMENT — GAIT ASSESSMENTS
DEVIATION: BRADYKINETIC;SHUFFLED GAIT
GAIT LEVEL OF ASSIST: MINIMAL ASSIST
DISTANCE (FEET): 60
ASSISTIVE DEVICE: FRONT WHEEL WALKER

## 2022-12-07 NOTE — ASSESSMENT & PLAN NOTE
Last A1c 6.2%, not on chronic therapy.  Blood glucose since admission within normal limits.  Asymptomatic.  -Monitor and will need follow-up as an outpatient

## 2022-12-07 NOTE — ASSESSMENT & PLAN NOTE
+ generallized weakness. Multiple unwitnessed GLF. No focal neurologic deficit.  CT head without acute changes other than sinusitis. Possible delirium from ongoing infectious process, however, also possible missed stroke. No substance use per his report. X-ray of the right hip negative for fractures, stable left hip arthroplasty. Concern for multivitamin deficiencies treated.  Mentation and functioning at baseline.  - MRI brain w/oc pending  - Thiamine and folate empiric  - Aspiration precautions   - Fall precautions   - Seizure precautions   - PT/OT eval  - Telemetry

## 2022-12-07 NOTE — ASSESSMENT & PLAN NOTE
Suspect folate deficiency and liver disease, RUQ US confirming early cirrhosis.   - Folate daily   - Monitor

## 2022-12-07 NOTE — ASSESSMENT & PLAN NOTE
Under the suspicion of missed stroke, possibly he had an aspiration episode and early consolidation, mild hypoxia, + procal with SIRS. Resolved.   - Completed antibiotic therapy  - Aspiration precautions   - Incentive Spirometry

## 2022-12-07 NOTE — ASSESSMENT & PLAN NOTE
Patient with acute LUTS as described + inflammatory changes in UA, significant piuria and + nitrites. Possible underlying prostate condition and constipation favoring infection. Suspected pyelonephritis given SIRS and flank pain.  Urine cultures growth Klebsiella pneumoniae with R to TMP/SMX, otherwise S. SIRS controlled, symptoms improved. Completed treatment.    - Augmentin completed  - I and Os / UO  - Monitor
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
## 2022-12-07 NOTE — ASSESSMENT & PLAN NOTE
Elevaded troponin since admission with a transient episode of chest pain. EKG unchanged from baseline. Likely type 1+2 MI. Cardiology consulted, considering no cardiac catheterization due to advanced dementia and comorbidities. Patient has underlying CAD, [facility] in 2010 with diffuse disease in obtuse marginal, LAD 50-60% stenosis. New TTE showed no wall motion abnormalities. No chest pain, hemodynamically stable.   -Consider ACE inhibitor outpt  - Metoprolol   - Continue aspirin and statin  - Heparin q8  - Will need follow-up as an outpatient

## 2022-12-07 NOTE — PROGRESS NOTES
Cardiology Follow-up Consult Note    Date of Service:    12/7/2022      Consulting Physician: ROCIO Hills M.D.    Name:   Sina Lewis     YOB: 1940  Age:   82 y.o.  male   MRN:   [id number]      HPI:    Sina Lewis is a 82 y.o. male with a history of HTN, CKD stage 3, CAD with MI in 1992, hypothyroidism, dementia who presented via EMS on 12/5/22 after multiple GLFs in the past couple of days. Patient had been feeling weak and had multiple falls. Injured his left hip. Unsure if he hit his head or lost consciousness. Work-up in the ED was concerning for possible pneumonia and UTI. CXR showed right basilar opacity likely atelectasis. CT head negative. XR of left hip negative. Troponin was found to be elevated at 63->66->79->118. EKG showed no  no acute ST-T segment changes.      The patient reports left hip pain from his fall and abdominal pain due to constipation. Denies any chest pain, palpitations, orthopnea, PND, shortness of breath or lower extremity swelling. Prior history of CAD in 1991. Family history of CAD in a sibling. Stopped smoking 50 years ago. Smoked for 2-3 years 0.5 ppd. 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       Interval History:  12/6: Complaints of multiple pain all over. Left hip pain is the worse. No cardiac symptoms. Patient does have shortness of breath, which he attributes to the pneumonia. EKG showed no  no acute ST-T segment changes. ECHO ordered.  12/7: Patient's MSK pain more tolerable today. Shortness of breath improved. No cardiac symptoms.       Review of Systems   Constitutional: Negative for chills, decreased appetite, diaphoresis, fever, malaise/fatigue and night sweats.   HENT:  Negative for hearing loss and sore throat.    Eyes:  Negative for double vision.   Cardiovascular:  Negative for chest pain, dyspnea on exertion, irregular heartbeat, leg swelling, palpitations and paroxysmal nocturnal dyspnea.  "  Respiratory:  Positive for shortness of breath. Negative for cough and wheezing.    Endocrine: Negative for cold intolerance, heat intolerance, polydipsia, polyphagia and polyuria.   Hematologic/Lymphatic: Does not bruise/bleed easily.   Skin:  Negative for dry skin, itching and rash.   Musculoskeletal:  Positive for back pain, falls, joint pain and neck pain.   Gastrointestinal:  Positive for abdominal pain and constipation. Negative for diarrhea, nausea and vomiting.   Genitourinary:  Negative for dysuria, frequency and urgency.   Neurological:  Negative for dizziness, focal weakness, headaches, light-headedness, numbness, paresthesias and vertigo.   Psychiatric/Behavioral:  Positive for memory loss. Negative for depression. The patient is not nervous/anxious.    A complete ROS was performed and is negative except for pertinent positives mentioned in HPI.    Past medical, surgical, social, and family history reviewed and unchanged from admission except as noted in assessment and plan.    Medications: Reviewed in MAR      Allergies   Allergen Reactions    Nkda [No Known Drug Allergy]          Intake/Output Summary (Last 24 hours) at 12/7/2022 0752  Last data filed at 12/7/2022 0313  Gross per 24 hour   Intake 3949.24 ml   Output 1200 ml   Net 2749.24 ml        Physical Exam  Body mass index is 29.79 kg/m².  /72   Pulse 66   Temp 35.9 °C (96.6 °F) (Temporal)   Resp 18   Ht 1.753 m (5' 9\")   Wt 91.5 kg (201 lb 11.5 oz)   SpO2 92%   Vitals:    12/06/22 1538 12/06/22 1944 12/07/22 0305 12/07/22 0741   BP: 135/74 119/66 116/62 135/72   Pulse: 63 94 74 66   Resp: 18 18 18 18   Temp: 36.3 °C (97.3 °F) 37.1 °C (98.7 °F) 36.7 °C (98 °F) 35.9 °C (96.6 °F)   TempSrc: Temporal Temporal Temporal Temporal   SpO2: 94% 92% 92% 92%   Weight:       Height:         Oxygen Therapy:  Pulse Oximetry: 92 %, O2 (LPM): 2, O2 Delivery Device: Nasal Cannula    General: Well appearing and in no apparent distress  Eyes: nl " conjunctiva  ENT: OP clear  Neck: JVP not elevated,  no carotid bruits  Lungs: normal respiratory effort, CTAB  Heart: RRR, no murmurs, no rubs or gallops, no edema bilateral lower extremities. No LV/RV heave on cardiac palpatation. 2+ bilateral radial pulses.  2+ bilateral dp pulses.   Abdomen: soft, non tender, non distended, no masses, normal bowel sounds.  No HSM.  Extremities/MSK: no clubbing, no cyanosis. TTP on left hip.   Neurological: No focal sensory deficits  Psychiatric: Appropriate affect, A/O x 2 (person, place)   Skin: Warm extremities    Labs (personally reviewed and notable for):   Lab Results   Component Value Date/Time    SODIUM 137 12/07/2022 02:39 AM    POTASSIUM 3.5 (L) 12/07/2022 02:39 AM    CHLORIDE 105 12/07/2022 02:39 AM    CO2 23 12/07/2022 02:39 AM    GLUCOSE 98 12/07/2022 02:39 AM    BUN 15 12/07/2022 02:39 AM    CREATININE 1.27 12/07/2022 02:39 AM    CREATININE 1.7 (H) 03/27/2009 09:20 PM      Lab Results   Component Value Date/Time    WBC 5.9 12/07/2022 02:39 AM    RBC 3.25 (L) 12/07/2022 02:39 AM    HEMOGLOBIN 11.2 (L) 12/07/2022 02:39 AM    HEMATOCRIT 34.0 (L) 12/07/2022 02:39 AM    .6 (H) 12/07/2022 02:39 AM    MCH 34.5 (H) 12/07/2022 02:39 AM    MCHC 32.9 (L) 12/07/2022 02:39 AM    MPV 10.7 12/07/2022 02:39 AM    NEUTSPOLYS 64.30 12/07/2022 02:39 AM    LYMPHOCYTES 21.50 (L) 12/07/2022 02:39 AM    MONOCYTES 11.90 12/07/2022 02:39 AM    EOSINOPHILS 1.50 12/07/2022 02:39 AM    BASOPHILS 0.30 12/07/2022 02:39 AM      Lab Results   Component Value Date/Time    CHOLSTRLTOT 142 04/25/2019 03:09 PM    LDL 82 04/25/2019 03:09 PM    HDL 34 (A) 04/25/2019 03:09 PM    TRIGLYCERIDE 132 04/25/2019 03:09 PM       Lab Results   Component Value Date/Time    TROPONINT 118 (H) 12/06/2022 0319     No results found for: NTPROBNP    Cardiac Imaging and Procedures Review:    EKG dated 12/5/2022:  NSR with PVCs, LAD and prolonged VT interval, rate of 93, Qtc 466, no acute ST-T segment changes.       EKG dated 12/6/2022:  NSR, prolonged LA interval, anterolateral infarct rate of 83, Qtc 330, possible poor LA wave progression, no acute ST-T segment changes.   .     Echo dated 10/3/14:   Normal left ventricular systolic function. EF 60-65%  Mild concentric left ventricular hypertrophy.   Grade I diastolic dysfunction - mitral inflow E/A is <1.0.   Mitral annular calcification.   Trace mitral regurgitation.      [facility] (2/28/2010):   Negative myocardial perfusion scan.      [facility] (3/1/2010):   1. Well served left ventricular systolic function.  2. Diffuse high-grade stenosis in the proximal segment of a small      2nd obtuse marginal artery, this is unchanged in appearance from      the previous angiogram in 2008.  3. Stenosis in the proximal segment of the 2nd diagonal artery.      This is approximately 50% to 60% in severity and unchanged from      the previous angiogram.  4. Coronary artery calcification by fluoroscopy.       Radiology test Review:  DX-HIP-UNILATERAL-WITH PELVIS-1 VIEW LEFT   Final Result      No acute osseous abnormality. Left hip arthroplasty is well seated.      DX-CHEST-PORTABLE (1 VIEW)   Final Result      Chronic elevation of the right hemidiaphragm with right basilar opacity, likely atelectasis.         CT-HEAD W/O   Final Result      1. No CT evidence of acute infarct, hemorrhage or mass.   2. Mild to moderate global parenchymal atrophy. Chronic small vessel ischemic changes.   3. Mild to moderate ethmoid sinus disease.      MR-BRAIN-W/O    (Results Pending)   US-RUQ    (Results Pending)   EC-ECHOCARDIOGRAM COMPLETE W/O CONT    (Results Pending)       Problem list:  Principal Problem:    Frequent falls POA: Unknown  Active Problems:    CKD (chronic kidney disease) stage 3, GFR 30-59 ml/min ([facility]) POA: Yes    Hypothyroid POA: Yes    Community acquired pneumonia of right lower lobe of lung POA: Yes    Type 2 acute myocardial infarction ([facility]) POA: Unknown    Sepsis ([facility])  POA: Unknown    UTI (urinary tract infection) POA: Unknown    Hypokalemia POA: Unknown    Thrombocytopenia ([facility]) POA: Unknown    Prediabetes POA: Unknown    Delirium POA: Unknown    Chronic idiopathic constipation POA: Unknown  Resolved Problems:    Elevated CPK POA: Unknown      Impression and Medical Decision Making:  #NSTEMI Type 2 in setting of sepsis and CKD stage 3  #CAD  #HTN   #CKD stage 3  #Sepsis  #Pneumonia  #UTI     Recommendations:  --Patient with history of CAD and CKD stage 3 with elevated troponins and EKGs with no ST-T segment changes likely NSTEMI Type 2 in setting of sepsis and poor renal function. No current chest pain.  --Patient with dementia and mainly sedentary. Poor candidate for coronary angio. Recommend medical management with dual antiplatelets for 12 months, beta blocker, ace/arb.   --Continue aspirin, ACE/ARB, statin  --ECHO ordered 12/6. Awaiting ECHO. Consider adding MRA pending echo result.    Please note that this dictation was created using voice recognition software. I have made every reasonable attempt to correct obvious errors, but it is possible there are errors of grammar and possibly content that I did not discover before finalizing the note.

## 2022-12-07 NOTE — ASSESSMENT & PLAN NOTE
Last TSH many years ago.   New TSH was under normal limits, likely suppressed, decreased dose.  -Levothyroxine 75 mcg

## 2022-12-07 NOTE — DISCHARGE PLANNING
Pending PT eval.   OT eval [facility] w/ FWW, reported poor memory and hearing.  Writer informed by [facility] SW [facility] [phone]  Return call by writer- JADEN left requesting call back.
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
## 2022-12-07 NOTE — CARE PLAN
The patient is Stable - Low risk of patient condition declining or worsening    Shift Goals  Clinical Goals: monitor neuro status  Patient Goals: sleep  Family Goals: SILVIO    Progress made toward(s) clinical / shift goals:    Problem: Pain - Standard  Goal: Alleviation of pain or a reduction in pain to the patient’s comfort goal  Outcome: Progressing  Note: Pt has no complaints of pain at this time.     Problem: Fall Risk  Goal: Patient will remain free from falls  Outcome: Progressing  Note: Call light and belongings within reach, bed down and locked, hourly rounding in progress. Treaded socks in use, no DME at bedside. Bed alarm in use, pt calls appropriately.         Patient is not progressing towards the following goals:

## 2022-12-07 NOTE — ASSESSMENT & PLAN NOTE
Likely on underlying dementia MOCA test 7, to be better assessed as an oupt. 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 Mentation improving, fully alert and participative. Likely resolved.

## 2022-12-07 NOTE — CARE PLAN
The patient is Watcher - Medium risk of patient condition declining or worsening    Shift Goals  Clinical Goals: Q4 neuro checks, out of bed  Patient Goals: ambulate  Family Goals: SILVIO    Progress made toward(s) clinical / shift goals:  Patient oriented to person and place this shift. Pt on RA when up in the chair, but requiring 2L NC in bed. Pt using IS at bedside, able to clear 1500ml. Pt up to chair for breakfast. PT/OT and Echo to bedside. Wound team consult placed for R foot redness/flakiness. Pt with BM this shift, pt reporting stomach is unsettled in the afternoon.     Patient is not progressing towards the following goals:      Problem: Knowledge Deficit - Standard  Goal: Patient and family/care givers will demonstrate understanding of plan of care, disease process/condition, diagnostic tests and medications  Outcome: Not Progressing

## 2022-12-07 NOTE — ASSESSMENT & PLAN NOTE
SIRS given fever,tachypnea, tachycardia. Also left shift and thrombocytopenia. Source was likely UTI + early aspiration pneumonia. Urine culture with Klebsiella pneumoniae. A lesion suggesting vegetation was seen in the AV in the TTE, however very low suspicion based on clinical improvement, able to transition to oral antibiotics which he completed. Pt is hemodynamically stable, RANDAL resolved, SIRS resolved.   - Augmentin completed  - Heparin for DVT PP   - Monitor

## 2022-12-07 NOTE — CARE PLAN
The patient is Stable - Low risk of patient condition declining or worsening    Shift Goals  Clinical Goals: Heparin gtt, speech consult, have BM  Patient Goals: rest  Family Goals: zamzam    Progress made toward(s) clinical / shift goals:  Pt oriented oriented to person and place, history of dementia. Easily reoriented. Per niece Thania, pt usually not aware of time. Pt not reporting any pain. Heparin gtt initiated this shift for elevated troponins, heparin Xa therapeutic X1. Soap suds enema given this shift, patient with medium anna/tan soft/formed stool. PRN miralax also given. Speech to bedside, Minced and moist diet with thin liquids ordered. Pt turning self in bed. Plan for abd US on 12/7 AM, pt NPO at 0000.    Patient is not progressing towards the following goals:      Problem: Knowledge Deficit - Standard  Goal: Patient and family/care givers will demonstrate understanding of plan of care, disease process/condition, diagnostic tests and medications  Outcome: Not Progressing

## 2022-12-08 PROBLEM — I21.4 NSTEMI (NON-ST ELEVATED MYOCARDIAL INFARCTION) (HCC): Status: ACTIVE | Noted: 2022-12-05

## 2022-12-08 LAB
ANION GAP SERPL CALC-SCNC: 10 MMOL/L (ref 7–16)
BASOPHILS # BLD AUTO: 0.2 % (ref 0–1.8)
BASOPHILS # BLD: 0.01 K/UL (ref 0–0.12)
BUN SERPL-MCNC: 14 MG/DL (ref 8–22)
CALCIUM SERPL-MCNC: 8.6 MG/DL (ref 8.5–10.5)
CHLORIDE SERPL-SCNC: 103 MMOL/L (ref 96–112)
CO2 SERPL-SCNC: 23 MMOL/L (ref 20–33)
CREAT SERPL-MCNC: 1.28 MG/DL (ref 0.5–1.4)
EOSINOPHIL # BLD AUTO: 0.08 K/UL (ref 0–0.51)
EOSINOPHIL NFR BLD: 1.4 % (ref 0–6.9)
ERYTHROCYTE [DISTWIDTH] IN BLOOD BY AUTOMATED COUNT: 54.3 FL (ref 35.9–50)
GFR SERPLBLD CREATININE-BSD FMLA CKD-EPI: 56 ML/MIN/1.73 M 2
GLUCOSE SERPL-MCNC: 106 MG/DL (ref 65–99)
HCT VFR BLD AUTO: 34.1 % (ref 42–52)
HGB BLD-MCNC: 11.5 G/DL (ref 14–18)
IMM GRANULOCYTES # BLD AUTO: 0.02 K/UL (ref 0–0.11)
IMM GRANULOCYTES NFR BLD AUTO: 0.4 % (ref 0–0.9)
LYMPHOCYTES # BLD AUTO: 1.04 K/UL (ref 1–4.8)
LYMPHOCYTES NFR BLD: 18.6 % (ref 22–41)
MAGNESIUM SERPL-MCNC: 1.9 MG/DL (ref 1.5–2.5)
MCH RBC QN AUTO: 34.3 PG (ref 27–33)
MCHC RBC AUTO-ENTMCNC: 33.7 G/DL (ref 33.7–35.3)
MCV RBC AUTO: 101.8 FL (ref 81.4–97.8)
MONOCYTES # BLD AUTO: 0.65 K/UL (ref 0–0.85)
MONOCYTES NFR BLD AUTO: 11.6 % (ref 0–13.4)
NEUTROPHILS # BLD AUTO: 3.8 K/UL (ref 1.82–7.42)
NEUTROPHILS NFR BLD: 67.8 % (ref 44–72)
NRBC # BLD AUTO: 0 K/UL
NRBC BLD-RTO: 0 /100 WBC
PLATELET # BLD AUTO: 74 K/UL (ref 164–446)
PMV BLD AUTO: 10.9 FL (ref 9–12.9)
POTASSIUM SERPL-SCNC: 3.8 MMOL/L (ref 3.6–5.5)
RBC # BLD AUTO: 3.35 M/UL (ref 4.7–6.1)
SODIUM SERPL-SCNC: 136 MMOL/L (ref 135–145)
TROPONIN T SERPL-MCNC: 267 NG/L (ref 6–19)
WBC # BLD AUTO: 5.6 K/UL (ref 4.8–10.8)

## 2022-12-08 PROCEDURE — A9270 NON-COVERED ITEM OR SERVICE: HCPCS | Performed by: STUDENT IN AN ORGANIZED HEALTH CARE EDUCATION/TRAINING PROGRAM

## 2022-12-08 PROCEDURE — 36415 COLL VENOUS BLD VENIPUNCTURE: CPT

## 2022-12-08 PROCEDURE — 700111 HCHG RX REV CODE 636 W/ 250 OVERRIDE (IP): Performed by: HOSPITALIST

## 2022-12-08 PROCEDURE — 85025 COMPLETE CBC W/AUTO DIFF WBC: CPT

## 2022-12-08 PROCEDURE — 700102 HCHG RX REV CODE 250 W/ 637 OVERRIDE(OP)

## 2022-12-08 PROCEDURE — 700102 HCHG RX REV CODE 250 W/ 637 OVERRIDE(OP): Performed by: HOSPITALIST

## 2022-12-08 PROCEDURE — 83735 ASSAY OF MAGNESIUM: CPT

## 2022-12-08 PROCEDURE — A9270 NON-COVERED ITEM OR SERVICE: HCPCS

## 2022-12-08 PROCEDURE — 700105 HCHG RX REV CODE 258: Performed by: STUDENT IN AN ORGANIZED HEALTH CARE EDUCATION/TRAINING PROGRAM

## 2022-12-08 PROCEDURE — 80048 BASIC METABOLIC PNL TOTAL CA: CPT

## 2022-12-08 PROCEDURE — 90662 IIV NO PRSV INCREASED AG IM: CPT | Performed by: HOSPITALIST

## 2022-12-08 PROCEDURE — 700111 HCHG RX REV CODE 636 W/ 250 OVERRIDE (IP)

## 2022-12-08 PROCEDURE — 99232 SBSQ HOSP IP/OBS MODERATE 35: CPT | Mod: GC | Performed by: HOSPITALIST

## 2022-12-08 PROCEDURE — 700102 HCHG RX REV CODE 250 W/ 637 OVERRIDE(OP): Performed by: STUDENT IN AN ORGANIZED HEALTH CARE EDUCATION/TRAINING PROGRAM

## 2022-12-08 PROCEDURE — A9270 NON-COVERED ITEM OR SERVICE: HCPCS | Performed by: HOSPITALIST

## 2022-12-08 PROCEDURE — 770001 HCHG ROOM/CARE - MED/SURG/GYN PRIV*

## 2022-12-08 PROCEDURE — 84484 ASSAY OF TROPONIN QUANT: CPT

## 2022-12-08 PROCEDURE — 90471 IMMUNIZATION ADMIN: CPT

## 2022-12-08 PROCEDURE — 700105 HCHG RX REV CODE 258

## 2022-12-08 PROCEDURE — 3E02340 INTRODUCTION OF INFLUENZA VACCINE INTO MUSCLE, PERCUTANEOUS APPROACH: ICD-10-PCS | Performed by: HOSPITALIST

## 2022-12-08 RX ORDER — PETROLATUM 42 G/100G
OINTMENT TOPICAL DAILY
Status: DISCONTINUED | OUTPATIENT
Start: 2022-12-08 | End: 2022-12-29 | Stop reason: HOSPADM

## 2022-12-08 RX ADMIN — BENAZEPRIL HYDROCHLORIDE 10 MG: 20 TABLET ORAL at 20:33

## 2022-12-08 RX ADMIN — SODIUM CHLORIDE, POTASSIUM CHLORIDE, SODIUM LACTATE AND CALCIUM CHLORIDE: 600; 310; 30; 20 INJECTION, SOLUTION INTRAVENOUS at 04:26

## 2022-12-08 RX ADMIN — HEPARIN SODIUM 5000 UNITS: 5000 INJECTION, SOLUTION INTRAVENOUS; SUBCUTANEOUS at 04:23

## 2022-12-08 RX ADMIN — SENNOSIDES AND DOCUSATE SODIUM 2 TABLET: 50; 8.6 TABLET ORAL at 04:22

## 2022-12-08 RX ADMIN — TAMSULOSIN HYDROCHLORIDE 0.4 MG: 0.4 CAPSULE ORAL at 17:34

## 2022-12-08 RX ADMIN — NYSTATIN: 100000 POWDER TOPICAL at 04:22

## 2022-12-08 RX ADMIN — THIAMINE HYDROCHLORIDE 500 MG: 100 INJECTION, SOLUTION INTRAMUSCULAR; INTRAVENOUS at 15:31

## 2022-12-08 RX ADMIN — INFLUENZA A VIRUS A/VICTORIA/2570/2019 IVR-215 (H1N1) ANTIGEN (FORMALDEHYDE INACTIVATED), INFLUENZA A VIRUS A/DARWIN/9/2021 SAN-010 (H3N2) ANTIGEN (FORMALDEHYDE INACTIVATED), INFLUENZA B VIRUS B/PHUKET/3073/2013 ANTIGEN (FORMALDEHYDE INACTIVATED), AND INFLUENZA B VIRUS B/MICHIGAN/01/2021 ANTIGEN (FORMALDEHYDE INACTIVATED) 0.7 ML: 60; 60; 60; 60 INJECTION, SUSPENSION INTRAMUSCULAR at 04:23

## 2022-12-08 RX ADMIN — AMOXICILLIN AND CLAVULANATE POTASSIUM 1 TABLET: 875; 125 TABLET, FILM COATED ORAL at 04:23

## 2022-12-08 RX ADMIN — FOLIC ACID 1 MG: 1 TABLET ORAL at 04:22

## 2022-12-08 RX ADMIN — CLOPIDOGREL BISULFATE 75 MG: 75 TABLET ORAL at 04:23

## 2022-12-08 RX ADMIN — ASPIRIN 81 MG 81 MG: 81 TABLET ORAL at 17:34

## 2022-12-08 RX ADMIN — LEVOTHYROXINE SODIUM 75 MCG: 0.07 TABLET ORAL at 04:22

## 2022-12-08 RX ADMIN — HEPARIN SODIUM 5000 UNITS: 5000 INJECTION, SOLUTION INTRAVENOUS; SUBCUTANEOUS at 17:35

## 2022-12-08 RX ADMIN — ATORVASTATIN CALCIUM 40 MG: 40 TABLET, FILM COATED ORAL at 17:35

## 2022-12-08 RX ADMIN — METOPROLOL TARTRATE 12.5 MG: 25 TABLET, FILM COATED ORAL at 17:34

## 2022-12-08 RX ADMIN — SENNOSIDES AND DOCUSATE SODIUM 2 TABLET: 50; 8.6 TABLET ORAL at 17:34

## 2022-12-08 RX ADMIN — AMOXICILLIN AND CLAVULANATE POTASSIUM 1 TABLET: 875; 125 TABLET, FILM COATED ORAL at 17:34

## 2022-12-08 RX ADMIN — NYSTATIN: 100000 POWDER TOPICAL at 17:35

## 2022-12-08 RX ADMIN — METOPROLOL TARTRATE 12.5 MG: 25 TABLET, FILM COATED ORAL at 04:22

## 2022-12-08 RX ADMIN — QUETIAPINE FUMARATE 200 MG: 200 TABLET, FILM COATED ORAL at 20:33

## 2022-12-08 RX ADMIN — Medication 1000 UNITS: at 04:23

## 2022-12-08 RX ADMIN — THIAMINE HYDROCHLORIDE 500 MG: 100 INJECTION, SOLUTION INTRAMUSCULAR; INTRAVENOUS at 09:47

## 2022-12-08 RX ADMIN — Medication: at 17:35

## 2022-12-08 RX ADMIN — THIAMINE HYDROCHLORIDE 500 MG: 100 INJECTION, SOLUTION INTRAMUSCULAR; INTRAVENOUS at 20:37

## 2022-12-08 ASSESSMENT — PAIN DESCRIPTION - PAIN TYPE
TYPE: ACUTE PAIN
TYPE: ACUTE PAIN;CHRONIC PAIN

## 2022-12-08 ASSESSMENT — PAIN SCALES - WONG BAKER: WONGBAKER_NUMERICALRESPONSE: DOESN'T HURT AT ALL
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
## 2022-12-08 NOTE — CARE PLAN
The patient is Stable - Low risk of patient condition declining or worsening    Shift Goals  Clinical Goals: monitor neuro status  Patient Goals: sleep  Family Goals: SILVIO    Progress made toward(s) clinical / shift goals:    Problem: Pain - Standard  Goal: Alleviation of pain or a reduction in pain to the patient’s comfort goal  Outcome: Progressing  Note: Pt has no complaints of pain at this time.     Problem: Fall Risk  Goal: Patient will remain free from falls  Outcome: Progressing  Note: Call light and belongings within reach, bed down and locked, hourly rounding in progress. Treaded socks in use, no DME at bedside. Bed alarm in use.         Patient is not progressing towards the following goals:

## 2022-12-08 NOTE — CARE PLAN
Problem: Skin Integrity  Goal: Skin integrity is maintained or improved  Outcome: Progressing   The patient is Stable - Low risk of patient condition declining or worsening    Shift Goals  Clinical Goals: Getting better  Patient Goals: sleep and rest  Family Goals: SILVIO    Progress made toward(s) clinical / shift goals:  Pt given extra pillows for support      Assumed care of patient at 0715. Received bedside report from night shift RN. Bed is locked and lowest position, call light within reach. Treaded socks in place. Patient updated on plan of care, no complaints or pain at this time. White board updated. Pt AxOx4 Patient breathing pattern is unlabored 1L NC. Pt is medical. All needs met at this time. Will continue care and monitoring as ordered.

## 2022-12-08 NOTE — PROGRESS NOTES
1540: Pt reporting abdominal discomfort/pain. RN asked what the pain felt like. Pt reporting it feels like nausea. PRN compazine administered. While administering, pt reporting he is also having chest pain. Denying SOB, on 1L NC. VSS. Dr. Estrella called, EKG and troponin ordered.     1755: Tara from Lab called with critical result of Trop at 206. Critical lab result read back to Tara.   Dr. Estrella notified of critical lab result at 1755.  Critical lab result read back by Dr. Estrella.

## 2022-12-08 NOTE — ASSESSMENT & PLAN NOTE
Suspected missed stroke given clinical presentation of confusion, weakness and falls + mild left facial drop and dysarthria. Symptoms resolving, patient seems to be at baseline.    - MRI Pending  - Continuing aspirin, statin, heparin  - Monitoring

## 2022-12-08 NOTE — WOUND TEAM
[facility]  Inpatient Services  Wound and Skin Care Brief Evaluation    Admission Date: 12/5/2022     Last order of IP CONSULT TO WOUND CARE was found on 12/7/2022 from Hospital Encounter on 12/5/2022     HPI, PMH, SH: Reviewed    Chief Complaint   Patient presents with    Fall     BIB EMS from home for 2 falls over the last couple days, hx of dementia, pt is poor historian, has some family that checks on him, c/o left hip pain     Diagnosis: Community acquired pneumonia of right lower lobe of lung [J18.9]    Unit where seen by Wound Team: S170/02     Wound consult placed regarding right foot. Chart and images reviewed. This RN in to assess patient. Pt pleasant and agreeable. Patient right dorsal foot with no open wound present, dry skin present, per patient he states he puts lotion on it but has not needed advanced wound care for quite some time. Aquaphor ointment ordered for hydration. No pressure injuries or advanced wound care needs identified. Wound consult completed. No further follow up unless indicated and consulted.           RSKIN:   CURRENTLY IN PLACE (X), APPLIED THIS VISIT (A), ORDERED (O):   Q shift Stan:  X  Q shift pressure point assessments:  X    Surface/Positioning   Pressure redistribution mattress    X        Low Airloss          Bariatric foam      Bariatric MARIA ANTONIA     Waffle cushion        Waffle Overlay          Reposition q 2 hours    X  TAPs Turning system     Z Morgan Pillow     Offloading/Redistribution   Sacral Mepilex (Silicone dressing)     Heel Mepilex (Silicone dressing)    A, X     Heel float boots (Prevalon boot)             Float Heels off Bed with Pillows           Respiratory n/a  Silicone O2 tubing         Gray Foam Ear protectors     Cannula fixation Device (Tender )          High flow offloading Clip    Elastic head band offloading device      Anchorfast                                                         Trach with Optifoam split foam              Containment/Moisture Prevention     Rectal tube or BMS    Purwick/Condom Cath        Bar[place]dale Catheter  X  Barrier wipes           Barrier paste       Antifungal tx      Interdry        Mobilization       Up to chair   X     Ambulate    X  PT/OT      Nutrition       Dietician        Diabetes Education      PO X    TF     TPN     NPO   # days     Other

## 2022-12-08 NOTE — CARE PLAN
The patient is Stable - Low risk of patient condition declining or worsening    Shift Goals  Clinical Goals: Getting better  Patient Goals: sleep and rest  Family Goals: SILVIO    Progress made toward(s) clinical / shift goals:  Assumed care of patient at 0715. Received bedside report from night shift RN. Bed is locked and lowest position, call light within reach. Treaded socks in place. Patient updated on plan of care, no complaints or pain at this time. White board updated. Pt AxOx3 disoriented to year Patient breathing pattern is unlabored on 1LNC  Pt is medical  Problem: Knowledge Deficit - Standard  Goal: Patient and family/care givers will demonstrate understanding of plan of care, disease process/condition, diagnostic tests and medications  12/8/2022 0908 by Chencho Cheryr R.N.  Outcome: Progressing  Note: Education completed from Stroke Education materials.   12/8/2022 0818 by Chencho Cherry R.N.  Outcome: Progressing     Problem: Pain - Standard  Goal: Alleviation of pain or a reduction in pain to the patient’s comfort goal  12/8/2022 0908 by Chencho Cherry R.N.  Outcome: Progressing  Flowsheets (Taken 12/8/2022 0908)  Non Verbal Scale: Calm  Casiano-Blackmon Scale: 0   OB Pain Level: 0-No Pain  Pain Rating Scale (NPRS): 0  Note: Pain well controlled without PRN meds   12/8/2022 0818 by Chencho Cherry R.N.  Outcome: Progressing     Problem: Skin Integrity  Goal: Skin integrity is maintained or improved  12/8/2022 0908 by Chencho Cherry RKareyN.  Outcome: Progressing  Note: Extra pilllows provided   12/8/2022 0818 by Chencho Cherry R.N.  Outcome: Progressing     Problem: Fall Risk  Goal: Patient will remain free from falls  12/8/2022 0908 by FIDEL DickeyNKarey  Outcome: Progressing  Note: Socks on and Chair alarm in place, call light within reach   12/8/2022 0818 by FIDEL DickeyN.  Outcome: Progressing     Problem: Optimal Care of the Stroke Patient  Goal: Optimal emergency care for the stroke patient  12/8/2022  0908 by Chencho Cherry R.N.  Outcome: Progressing  Flowsheets (Taken 12/8/2022 0908)  Emergency Care:   Educated patient/family on diagnosis, medications and testing   Administered antihypertensive medications per order   Administerd thrombolytic therapy per order   Verified consult orders placed  Note: Patient pending MRI, CT neg. Rehab continued   12/8/2022 0818 by Chencho Cherry R.N.  Outcome: Progressing     Problem: Optimal Care of the Stroke Patient  Goal: Optimal acute care for the stroke patient  12/8/2022 0908 by Chencho Cherry R.N.  Outcome: Progressing  Flowsheets (Taken 12/8/2022 0908)  Acute Care Order Review:   Reviewed orders for compliance with Stroke Protocol   Consulted with provider regarding Stroke Protocol orders  Note: Anticoag in place, rehab   12/8/2022 0818 by Chencho Cherry R.N.  Outcome: Progressing     Problem: Knowledge Deficit - Stroke Education  Goal: Patient's knowledge of stroke and risk factors will improve  12/8/2022 0908 by Chencho Cherry R.N.  Outcome: Progressing  Note: Teaching complete from Stroke education material at bedside need re enforcement.   12/8/2022 0818 by Chencho Cherry R.N.  Outcome: Progressing     Problem: Neuro Status  Goal: Neuro status will remain stable or improve  12/8/2022 0908 by Chencho Cherry R.N.  Outcome: Progressing  Flowsheets (Taken 12/8/2022 0908)  Level of Consciousness: Alert  12/8/2022 0818 by Chencho Cherry R.N.  Outcome: Progressing     Problem: Mobility - Stroke  Goal: Patient's capacity to carry out activities will improve  12/8/2022 0908 by Chencho Cherry R.N.  Outcome: Progressing  Flowsheets  Taken 12/8/2022 0908  Mobility:   Encouraged mobilization per interdisciplinary team recommendations   Monitored for signs of activity intolerance   Provided assistive devices   Provided rest periods between activities   Administered pain management to allow progressive mobilization   Collaborated with PT/OT  Level of Mobility: Level IV  Assistance:  Standby Assist  Taken 12/8/2022 0740  Activity Performed:   Ambulate   Edge of bed  Note: Up to chair early in AM   12/8/2022 0818 by Chencho Cherry RPANDA  Outcome: Progressing     Problem: Self Care  Goal: Patient will have the ability to perform ADLs independently or with assistance (bathe, groom, dress, toilet and feed)  12/8/2022 0908 by Chencho Cherry R.N.  Outcome: Progressing  Note: Eating on own.   12/8/2022 0818 by Chencho Cherry R.N.  Outcome: Progressing   . All needs met at this time. Will continue care and monitoring as ordered.

## 2022-12-09 LAB
ANION GAP SERPL CALC-SCNC: 10 MMOL/L (ref 7–16)
BUN SERPL-MCNC: 14 MG/DL (ref 8–22)
CALCIUM SERPL-MCNC: 9.1 MG/DL (ref 8.5–10.5)
CHLORIDE SERPL-SCNC: 104 MMOL/L (ref 96–112)
CO2 SERPL-SCNC: 26 MMOL/L (ref 20–33)
CREAT SERPL-MCNC: 1.39 MG/DL (ref 0.5–1.4)
GFR SERPLBLD CREATININE-BSD FMLA CKD-EPI: 50 ML/MIN/1.73 M 2
GLUCOSE SERPL-MCNC: 103 MG/DL (ref 65–99)
POTASSIUM SERPL-SCNC: 3.5 MMOL/L (ref 3.6–5.5)
SODIUM SERPL-SCNC: 140 MMOL/L (ref 135–145)

## 2022-12-09 PROCEDURE — 700102 HCHG RX REV CODE 250 W/ 637 OVERRIDE(OP)

## 2022-12-09 PROCEDURE — 700111 HCHG RX REV CODE 636 W/ 250 OVERRIDE (IP)

## 2022-12-09 PROCEDURE — 770001 HCHG ROOM/CARE - MED/SURG/GYN PRIV*

## 2022-12-09 PROCEDURE — 700105 HCHG RX REV CODE 258

## 2022-12-09 PROCEDURE — A9270 NON-COVERED ITEM OR SERVICE: HCPCS | Performed by: INTERNAL MEDICINE

## 2022-12-09 PROCEDURE — 700102 HCHG RX REV CODE 250 W/ 637 OVERRIDE(OP): Performed by: STUDENT IN AN ORGANIZED HEALTH CARE EDUCATION/TRAINING PROGRAM

## 2022-12-09 PROCEDURE — 36415 COLL VENOUS BLD VENIPUNCTURE: CPT

## 2022-12-09 PROCEDURE — A9270 NON-COVERED ITEM OR SERVICE: HCPCS

## 2022-12-09 PROCEDURE — A9270 NON-COVERED ITEM OR SERVICE: HCPCS | Performed by: STUDENT IN AN ORGANIZED HEALTH CARE EDUCATION/TRAINING PROGRAM

## 2022-12-09 PROCEDURE — 700102 HCHG RX REV CODE 250 W/ 637 OVERRIDE(OP): Performed by: INTERNAL MEDICINE

## 2022-12-09 PROCEDURE — 80048 BASIC METABOLIC PNL TOTAL CA: CPT

## 2022-12-09 PROCEDURE — 99231 SBSQ HOSP IP/OBS SF/LOW 25: CPT | Mod: GC | Performed by: INTERNAL MEDICINE

## 2022-12-09 PROCEDURE — 97530 THERAPEUTIC ACTIVITIES: CPT

## 2022-12-09 PROCEDURE — 92526 ORAL FUNCTION THERAPY: CPT

## 2022-12-09 RX ORDER — OXYCODONE HYDROCHLORIDE 10 MG/1
10 TABLET ORAL
Status: DISCONTINUED | OUTPATIENT
Start: 2022-12-09 | End: 2022-12-29 | Stop reason: HOSPADM

## 2022-12-09 RX ORDER — ONDANSETRON 2 MG/ML
4 INJECTION INTRAMUSCULAR; INTRAVENOUS EVERY 4 HOURS PRN
Status: DISCONTINUED | OUTPATIENT
Start: 2022-12-09 | End: 2022-12-29 | Stop reason: HOSPADM

## 2022-12-09 RX ORDER — ACETAMINOPHEN 500 MG
1000 TABLET ORAL EVERY 6 HOURS PRN
Status: DISCONTINUED | OUTPATIENT
Start: 2022-12-14 | End: 2022-12-29 | Stop reason: HOSPADM

## 2022-12-09 RX ORDER — OXYCODONE HYDROCHLORIDE 5 MG/1
5 TABLET ORAL
Status: DISCONTINUED | OUTPATIENT
Start: 2022-12-09 | End: 2022-12-29 | Stop reason: HOSPADM

## 2022-12-09 RX ORDER — ACETAMINOPHEN 500 MG
1000 TABLET ORAL EVERY 6 HOURS
Status: DISPENSED | OUTPATIENT
Start: 2022-12-09 | End: 2022-12-14

## 2022-12-09 RX ORDER — POTASSIUM CHLORIDE 20 MEQ/1
40 TABLET, EXTENDED RELEASE ORAL ONCE
Status: COMPLETED | OUTPATIENT
Start: 2022-12-09 | End: 2022-12-09

## 2022-12-09 RX ORDER — MORPHINE SULFATE 4 MG/ML
4 INJECTION INTRAVENOUS
Status: DISCONTINUED | OUTPATIENT
Start: 2022-12-09 | End: 2022-12-29 | Stop reason: HOSPADM

## 2022-12-09 RX ADMIN — NYSTATIN: 100000 POWDER TOPICAL at 18:07

## 2022-12-09 RX ADMIN — SENNOSIDES AND DOCUSATE SODIUM 2 TABLET: 50; 8.6 TABLET ORAL at 16:39

## 2022-12-09 RX ADMIN — FOLIC ACID 1 MG: 1 TABLET ORAL at 04:08

## 2022-12-09 RX ADMIN — CLOPIDOGREL BISULFATE 75 MG: 75 TABLET ORAL at 04:08

## 2022-12-09 RX ADMIN — NYSTATIN: 100000 POWDER TOPICAL at 04:09

## 2022-12-09 RX ADMIN — TAMSULOSIN HYDROCHLORIDE 0.4 MG: 0.4 CAPSULE ORAL at 16:39

## 2022-12-09 RX ADMIN — OXYCODONE HYDROCHLORIDE 10 MG: 10 TABLET ORAL at 12:43

## 2022-12-09 RX ADMIN — METOPROLOL TARTRATE 12.5 MG: 25 TABLET, FILM COATED ORAL at 04:09

## 2022-12-09 RX ADMIN — HEPARIN SODIUM 5000 UNITS: 5000 INJECTION, SOLUTION INTRAVENOUS; SUBCUTANEOUS at 04:08

## 2022-12-09 RX ADMIN — ACETAMINOPHEN 1000 MG: 500 TABLET ORAL at 12:43

## 2022-12-09 RX ADMIN — METOPROLOL TARTRATE 12.5 MG: 25 TABLET, FILM COATED ORAL at 16:39

## 2022-12-09 RX ADMIN — PROCHLORPERAZINE EDISYLATE 10 MG: 5 INJECTION INTRAMUSCULAR; INTRAVENOUS at 14:43

## 2022-12-09 RX ADMIN — AMOXICILLIN AND CLAVULANATE POTASSIUM 1 TABLET: 875; 125 TABLET, FILM COATED ORAL at 04:08

## 2022-12-09 RX ADMIN — ASPIRIN 81 MG 81 MG: 81 TABLET ORAL at 16:38

## 2022-12-09 RX ADMIN — THIAMINE HYDROCHLORIDE 500 MG: 100 INJECTION, SOLUTION INTRAMUSCULAR; INTRAVENOUS at 10:33

## 2022-12-09 RX ADMIN — ACETAMINOPHEN 1000 MG: 500 TABLET ORAL at 16:38

## 2022-12-09 RX ADMIN — Medication 1000 UNITS: at 04:08

## 2022-12-09 RX ADMIN — PROCHLORPERAZINE EDISYLATE 10 MG: 5 INJECTION INTRAMUSCULAR; INTRAVENOUS at 21:49

## 2022-12-09 RX ADMIN — POTASSIUM CHLORIDE 40 MEQ: 1500 TABLET, EXTENDED RELEASE ORAL at 13:52

## 2022-12-09 RX ADMIN — ATORVASTATIN CALCIUM 40 MG: 40 TABLET, FILM COATED ORAL at 16:39

## 2022-12-09 RX ADMIN — SENNOSIDES AND DOCUSATE SODIUM 2 TABLET: 50; 8.6 TABLET ORAL at 04:08

## 2022-12-09 RX ADMIN — BENAZEPRIL HYDROCHLORIDE 10 MG: 20 TABLET ORAL at 20:35

## 2022-12-09 RX ADMIN — AMOXICILLIN AND CLAVULANATE POTASSIUM 1 TABLET: 875; 125 TABLET, FILM COATED ORAL at 16:39

## 2022-12-09 RX ADMIN — LEVOTHYROXINE SODIUM 75 MCG: 0.07 TABLET ORAL at 04:08

## 2022-12-09 RX ADMIN — HEPARIN SODIUM 5000 UNITS: 5000 INJECTION, SOLUTION INTRAVENOUS; SUBCUTANEOUS at 16:39

## 2022-12-09 RX ADMIN — Medication: at 04:09

## 2022-12-09 ASSESSMENT — PAIN SCALES - WONG BAKER
WONGBAKER_NUMERICALRESPONSE: DOESN'T HURT AT ALL
WONGBAKER_NUMERICALRESPONSE: DOESN'T HURT AT ALL

## 2022-12-09 ASSESSMENT — GAIT ASSESSMENTS
DISTANCE (FEET): 100
GAIT LEVEL OF ASSIST: MINIMAL ASSIST
DEVIATION: BRADYKINETIC;SHUFFLED GAIT
ASSISTIVE DEVICE: FRONT WHEEL WALKER

## 2022-12-09 ASSESSMENT — COGNITIVE AND FUNCTIONAL STATUS - GENERAL
MOVING TO AND FROM BED TO CHAIR: A LITTLE
WALKING IN HOSPITAL ROOM: A LITTLE
CLIMB 3 TO 5 STEPS WITH RAILING: A LITTLE
TURNING FROM BACK TO SIDE WHILE IN FLAT BAD: A LITTLE
MOBILITY SCORE: 18
STANDING UP FROM CHAIR USING ARMS: A LITTLE
MOVING FROM LYING ON BACK TO SITTING ON SIDE OF FLAT BED: A LITTLE
SUGGESTED CMS G CODE MODIFIER MOBILITY: CK

## 2022-12-09 ASSESSMENT — PAIN DESCRIPTION - PAIN TYPE: TYPE: ACUTE PAIN
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
## 2022-12-09 NOTE — CARE PLAN
The patient is Stable - Low risk of patient condition declining or worsening    Shift Goals  Clinical Goals: monitor neuro status  Patient Goals: sleep  Family Goals: SILVIO    Progress made toward(s) clinical / shift goals:      Problem: Optimal Care of the Stroke Patient  Goal: Optimal emergency care for the stroke patient  Outcome: Progressing  Flowsheets (Taken 12/9/2022 0051)  Emergency Care: Administered antihypertensive medications per order  Note: NIHSS completed this shift. Neurological checks q4. Pt A&O 2-4, occasionally disorientated to situation and place. Anti-HTN medications administered.           Patient is not progressing towards the following goals:      Problem: Neuro Status  Goal: Neuro status will remain stable or improve  Outcome: Not Progressing  Note: Pt orientation waxes and weans throughout shift. Pt became disoriented to place and situation at 0000 neuro check, but is still oriented to self and time. No acute changes to neuromuscular status.

## 2022-12-09 NOTE — THERAPY
Physical Therapy   Daily Treatment     Patient Name: Sina Lewis  Age:  82 y.o., Sex:  male  Medical Record #: [id number]  Today's Date: 12/9/2022     Precautions  Precautions: Fall Risk;Swallow Precautions ( See Comments)  Comments: Hx dementia    Assessment    Pt received in bed and agreeable to PT session. Pt was eager to ambulate and progressed with distance. Pt did continue to require cues for posture in addition to managing the FWW appropriately. Pt required grossly CGA to min A overall. Recommending placement unless pt has consistent care at home. Will follow for acute PT to progress.    Plan    Continue current treatment plan.    DC Equipment Recommendations: Unable to determine at this time  Discharge Recommendations: Recommend post-acute placement for additional physical therapy services prior to discharge home     Objective       12/09/22 0855   Precautions   Precautions Fall Risk;Swallow Precautions ( See Comments)   Comments Hx dementia   Vitals   O2 Delivery Device Room air w/o2 available   Pain 0 - 10 Group   Therapist Pain Assessment Nurse Notified;Post Activity Pain Same as Prior to Activity;0   Cognition    Level of Consciousness Alert   Comments Very pleasant and cooperative. [place] and required repeated cues at times.   Balance   Sitting Balance (Static) Fair   Sitting Balance (Dynamic) Fair   Standing Balance (Static) Fair   Standing Balance (Dynamic) Fair -   Weight Shift Sitting Good   Weight Shift Standing Fair   Skilled Intervention Verbal Cuing;Compensatory Strategies;Sequencing   Comments w/ FWW and cues to stay closer to it for better support   Gait Analysis   Gait Level Of Assist Minimal Assist   Assistive Device Front Wheel Walker   Distance (Feet) 100   # of Times Distance was Traveled 1   Deviation Bradykinetic;Shuffled Gait   Skilled Intervention Verbal Cuing;Tactile Cuing;Compensatory Strategies   Comments Cues for upright posture and proximity to the FWW   Bed Mobility    Supine to  Sit Standby Assist   Sit to Supine Standby Assist   Skilled Intervention Verbal Cuing   Comments HOB elevated   Functional Mobility   Sit to Stand Contact Guard Assist   Bed, Chair, Wheelchair Transfer Contact Guard Assist   Mobility up with FWW   How much difficulty does the patient currently have...   Turning over in bed (including adjusting bedclothes, sheets and blankets)? 3   Sitting down on and standing up from a chair with arms (e.g., wheelchair, bedside commode, etc.) 3   Moving from lying on back to sitting on the side of the bed? 3   How much help from another person does the patient currently need...   Moving to and from a bed to a chair (including a wheelchair)? 3   Need to walk in a hospital room? 3   Climbing 3-5 steps with a railing? 3   6 clicks Mobility Score 18   Short Term Goals    Short Term Goal # 1 in 6 visits patient will demo all bed mobility indep for safe DC   Goal Outcome # 1 goal not met   Short Term Goal # 2 in 6 visits patient will ambulate 200' sup w/LRAD for safe DC   Goal Outcome # 2 Goal not met   Short Term Goal # 3 in 6 visits patient will demo all functional transfers sup w/LRAD for safe DC   Goal Outcome # 3 Goal not met   Anticipated Discharge Equipment and Recommendations   DC Equipment Recommendations Unable to determine at this time   Discharge Recommendations Recommend post-acute placement for additional physical therapy services prior to discharge home   Interdisciplinary Plan of Care Collaboration   IDT Collaboration with  Nursing   Patient Position at End of Therapy Seated;Chair Alarm On;Call Light within Reach;Tray Table within Reach;Phone within Reach   Collaboration Comments RN updated   Session Information   Date / Session Number  12/9-2(2/3, 12/13)
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
## 2022-12-09 NOTE — PROGRESS NOTES
"Mr. Lewis had a TTE done as part of evaluation for elevated troponin   I was asked if he needs further eval for possible endocarditis      \"Aortic Valve  Tricuspid aortic valve. Aortic valve sclerosis without significant   stenosis. There appears to be a small mobile echodensity on the aortic   valve which can be Lambls excrescence, atherosclerotic plaque or   vegetation.\"  On exam I do not hear any murmur  He has 3 sets of negative blood cultures from admit  WBC normal , procal <0.5 on admit   No risk factors for endocarditis  Persistently afebrile throughout hospitlization after initial temp    He has no criteria for diagnosis of  Endocarditis except for ? Vegetation   I do not recommend SUZY or any further evalution         "

## 2022-12-09 NOTE — PROGRESS NOTES
Ongoing care no acute issues at this time. Patient resting in bed safely with no c/o anything at this time. Will continue care and monitoring as ordered.  MRI ordered but not complete. MRI check form done.

## 2022-12-09 NOTE — THERAPY
"Speech Language Pathology  Daily Treatment     Patient Name: Sina Lewis  Age:  82 y.o., Sex:  male  Medical Record #: [id number]  Today's Date: 12/9/2022     Precautions  Precautions: Fall Risk, Swallow Precautions ( See Comments)  Comments: Hx dementia    HPI: Pt is an 83 y/o man presenting on 12/5 after multiple GLFs in the past few days. Reports cough and rhinorrhea.    Subjective  Pt agreeable and cooperative with all SLP tx tasks. Stated \"I really want to get up and walk\" and \"I hope they have cream of wheat with breakfast.\"    Assessment  Pt reports tolerance of current diet, but, with further discussion reveled \"It's hard to chew without my teeth.\" Dentures still not available at bedside. SLP discussed IDDSI levels with pt. PO trials administered: EC (transitional solid) and TN. No overt s/sx of aspiration noted w/ single and sequential sips TN from cup, including repeated 3 oz test. No increase in WOB, no cough/clear, no change in vocal quality. One swallow appreciated per bolus. Pt's mastication deliberate, appears to be allowing transitional solid to melt prior to swallowing bites. Small bites resulted in prolonged mastication. Pt reports he likes the things \"like scrambled eggs best.\" Agreeable to downgrade as dentures are not available to reliably downgrade and to match pt preference in PO.    Clinical Impressions  Pt presents without s/sx concerning for pharyngeal dysphagia this date. Oral phase dysphagia, evidenced by impaired mastication of RG solids, is likely due to edentulous state. Recommend downgrade to MM/TN diet with PO medication administration per pt preference and to address oral dysphagia. SLP will follow to monitor lung status and trial regular solids when pt's dentures are present.      Recommendations  1.  Minced and moist solids with thin liquids  2.  No instrumentation indicated at this time  3.  Swallowing Instructions & Precautions:   Supervision: Independent  Positioning: HOB at " "90* or up in chair as tolerated  Medication: With liquid wash  Strategies: Small bites/sips, slow rate of intake  Oral Care: BID      Plan    Continue current treatment plan.    Discharge Recommendations: Anticipate that the patient will have no further speech therapy needs after discharge from the hospital       Objective   12/09/22 0840   Vitals   O2 (LPM) 0   O2 Delivery Device Room air w/o2 available   Non Verbal Descriptors   Non Verbal Scale  Calm   Dysphagia    Diet / Liquid Recommendation Thin (0);Minced & Moist (5) - (Dysphagia II)   Nutritional Liquid Intake Rating Scale Non thickened beverages   Nutritional Food Intake Rating Scale Total oral diet with multiple consistencies but requiring special preparation or compensations   Recommended Route of Medication Administration   Medication Administration  Whole with Liquid Wash   Patient / Family Goals   Patient / Family Goal #1 \"I do okay with hamburgers but steak? Forget about it.\"   Goal #1 Outcome Progressing as expected   Short Term Goals   Short Term Goal # 1 Pt will consume diet of SB6/TN0 with no overt s/sx of aspiration or worsening of lung status.   Goal Outcome # 1 Goal not met   Short Term Goal # 2 Pt will consume diet of MM5/TN0 with no overt s/sx of aspiration or worsening of lung status.   Education Group   Education Provided Dysphagia   Dysphagia Patient Response Patient;Acceptance;Explanation;Verbal Demonstration;Action Demonstration   Interdisciplinary Plan of Care Collaboration   Collaboration Comments RN updated     "

## 2022-12-10 LAB
ANION GAP SERPL CALC-SCNC: 11 MMOL/L (ref 7–16)
BACTERIA BLD CULT: NORMAL
BACTERIA BLD CULT: NORMAL
BUN SERPL-MCNC: 17 MG/DL (ref 8–22)
CALCIUM SERPL-MCNC: 9.1 MG/DL (ref 8.5–10.5)
CHLORIDE SERPL-SCNC: 103 MMOL/L (ref 96–112)
CO2 SERPL-SCNC: 25 MMOL/L (ref 20–33)
CREAT SERPL-MCNC: 1.55 MG/DL (ref 0.5–1.4)
GFR SERPLBLD CREATININE-BSD FMLA CKD-EPI: 44 ML/MIN/1.73 M 2
GLUCOSE SERPL-MCNC: 109 MG/DL (ref 65–99)
POTASSIUM SERPL-SCNC: 4.1 MMOL/L (ref 3.6–5.5)
SIGNIFICANT IND 70042: NORMAL
SIGNIFICANT IND 70042: NORMAL
SITE SITE: NORMAL
SITE SITE: NORMAL
SODIUM SERPL-SCNC: 139 MMOL/L (ref 135–145)
SOURCE SOURCE: NORMAL
SOURCE SOURCE: NORMAL

## 2022-12-10 PROCEDURE — 700111 HCHG RX REV CODE 636 W/ 250 OVERRIDE (IP): Performed by: STUDENT IN AN ORGANIZED HEALTH CARE EDUCATION/TRAINING PROGRAM

## 2022-12-10 PROCEDURE — 700111 HCHG RX REV CODE 636 W/ 250 OVERRIDE (IP)

## 2022-12-10 PROCEDURE — 99231 SBSQ HOSP IP/OBS SF/LOW 25: CPT | Mod: GC | Performed by: INTERNAL MEDICINE

## 2022-12-10 PROCEDURE — A9270 NON-COVERED ITEM OR SERVICE: HCPCS | Performed by: STUDENT IN AN ORGANIZED HEALTH CARE EDUCATION/TRAINING PROGRAM

## 2022-12-10 PROCEDURE — 700102 HCHG RX REV CODE 250 W/ 637 OVERRIDE(OP): Performed by: STUDENT IN AN ORGANIZED HEALTH CARE EDUCATION/TRAINING PROGRAM

## 2022-12-10 PROCEDURE — 700102 HCHG RX REV CODE 250 W/ 637 OVERRIDE(OP)

## 2022-12-10 PROCEDURE — A9270 NON-COVERED ITEM OR SERVICE: HCPCS | Performed by: INTERNAL MEDICINE

## 2022-12-10 PROCEDURE — 80048 BASIC METABOLIC PNL TOTAL CA: CPT

## 2022-12-10 PROCEDURE — A9270 NON-COVERED ITEM OR SERVICE: HCPCS

## 2022-12-10 PROCEDURE — 770001 HCHG ROOM/CARE - MED/SURG/GYN PRIV*

## 2022-12-10 PROCEDURE — 36415 COLL VENOUS BLD VENIPUNCTURE: CPT

## 2022-12-10 PROCEDURE — 700102 HCHG RX REV CODE 250 W/ 637 OVERRIDE(OP): Performed by: INTERNAL MEDICINE

## 2022-12-10 RX ADMIN — TAMSULOSIN HYDROCHLORIDE 0.4 MG: 0.4 CAPSULE ORAL at 18:14

## 2022-12-10 RX ADMIN — Medication: at 05:14

## 2022-12-10 RX ADMIN — HEPARIN SODIUM 5000 UNITS: 5000 INJECTION, SOLUTION INTRAVENOUS; SUBCUTANEOUS at 18:14

## 2022-12-10 RX ADMIN — ATORVASTATIN CALCIUM 40 MG: 40 TABLET, FILM COATED ORAL at 18:14

## 2022-12-10 RX ADMIN — FOLIC ACID 1 MG: 1 TABLET ORAL at 04:51

## 2022-12-10 RX ADMIN — HEPARIN SODIUM 5000 UNITS: 5000 INJECTION, SOLUTION INTRAVENOUS; SUBCUTANEOUS at 04:54

## 2022-12-10 RX ADMIN — ACETAMINOPHEN 1000 MG: 500 TABLET ORAL at 18:14

## 2022-12-10 RX ADMIN — AMOXICILLIN AND CLAVULANATE POTASSIUM 1 TABLET: 875; 125 TABLET, FILM COATED ORAL at 18:13

## 2022-12-10 RX ADMIN — LEVOTHYROXINE SODIUM 75 MCG: 0.07 TABLET ORAL at 04:51

## 2022-12-10 RX ADMIN — BENAZEPRIL HYDROCHLORIDE 10 MG: 20 TABLET ORAL at 20:47

## 2022-12-10 RX ADMIN — ASPIRIN 81 MG 81 MG: 81 TABLET ORAL at 18:14

## 2022-12-10 RX ADMIN — ONDANSETRON 4 MG: 2 INJECTION INTRAMUSCULAR; INTRAVENOUS at 20:47

## 2022-12-10 RX ADMIN — ACETAMINOPHEN 1000 MG: 500 TABLET ORAL at 12:05

## 2022-12-10 RX ADMIN — ACETAMINOPHEN 1000 MG: 500 TABLET ORAL at 00:10

## 2022-12-10 RX ADMIN — ONDANSETRON 4 MG: 2 INJECTION INTRAMUSCULAR; INTRAVENOUS at 04:52

## 2022-12-10 RX ADMIN — PROCHLORPERAZINE EDISYLATE 10 MG: 5 INJECTION INTRAMUSCULAR; INTRAVENOUS at 16:07

## 2022-12-10 RX ADMIN — NYSTATIN: 100000 POWDER TOPICAL at 05:15

## 2022-12-10 RX ADMIN — ACETAMINOPHEN 1000 MG: 500 TABLET ORAL at 04:50

## 2022-12-10 RX ADMIN — AMOXICILLIN AND CLAVULANATE POTASSIUM 1 TABLET: 875; 125 TABLET, FILM COATED ORAL at 04:48

## 2022-12-10 RX ADMIN — Medication 1000 UNITS: at 04:51

## 2022-12-10 RX ADMIN — CLOPIDOGREL BISULFATE 75 MG: 75 TABLET ORAL at 04:52

## 2022-12-10 RX ADMIN — METOPROLOL TARTRATE 12.5 MG: 25 TABLET, FILM COATED ORAL at 04:53

## 2022-12-10 RX ADMIN — ACETAMINOPHEN 1000 MG: 500 TABLET ORAL at 23:50

## 2022-12-10 RX ADMIN — SENNOSIDES AND DOCUSATE SODIUM 2 TABLET: 50; 8.6 TABLET ORAL at 04:48

## 2022-12-10 RX ADMIN — SENNOSIDES AND DOCUSATE SODIUM 2 TABLET: 50; 8.6 TABLET ORAL at 18:14

## 2022-12-10 ASSESSMENT — FIBROSIS 4 INDEX: FIB4 SCORE: 5.2

## 2022-12-10 ASSESSMENT — PAIN DESCRIPTION - PAIN TYPE: TYPE: ACUTE PAIN

## 2022-12-10 NOTE — CARE PLAN
Patient worked with PT today and tolerated well walking down the philip with a walker. Sat in the chair for a few hours and tolerated well the ambulation to bathroom and back to bed free of falls. Developed pain in his right foot that seems to be on going. Communicated with the doctor and was able to control pain with meds and elevation. Neuro status remains stable with frequent checks and does not seem confused. Got a little nauseous after lunch which was relieved by nausea medicine. No complications.        The patient is Stable - Low risk of patient condition declining or worsening    Shift Goals  Clinical Goals: Monitor neuro and PT/OT  Patient Goals: Get out of bed  Family Goals: SILVIO    Progress made toward(s) clinical / shift goals:          Problem: Pain - Standard  Goal: Alleviation of pain or a reduction in pain to the patient’s comfort goal  Outcome: Progressing     Problem: Fall Risk  Goal: Patient will remain free from falls  Outcome: Progressing     Problem: Neuro Status  Goal: Neuro status will remain stable or improve  Outcome: Progressing     Problem: Mobility - Stroke  Goal: Patient's capacity to carry out activities will improve  Outcome: Progressing

## 2022-12-10 NOTE — CARE PLAN
Problem: Knowledge Deficit - Standard  Goal: Patient and family/care givers will demonstrate understanding of plan of care, disease process/condition, diagnostic tests and medications  Outcome: Not Progressing  Note: Patient forgets during night time. Messages repeated as needed. Hourly rounding and prn     Problem: Knowledge Deficit - Standard  Goal: Patient and family/care givers will demonstrate understanding of plan of care, disease process/condition, diagnostic tests and medications  Outcome: Not Progressing  Note: Patient forgets during night time. Messages repeated as needed. Hourly rounding and prn   The patient is Stable - Low risk of patient condition declining or worsening    Shift Goals  Clinical Goals: no fall  Patient Goals: comfort  Family Goals: SILVIO    Progress made toward(s) clinical / shift goals:        Patient is not progressing towards the following goals:      Problem: Knowledge Deficit - Standard  Goal: Patient and family/care givers will demonstrate understanding of plan of care, disease process/condition, diagnostic tests and medications  Outcome: Not Progressing  Note: Patient forgets during night time. Messages repeated as needed. Hourly rounding and prn

## 2022-12-11 LAB
ANION GAP SERPL CALC-SCNC: 9 MMOL/L (ref 7–16)
BUN SERPL-MCNC: 12 MG/DL (ref 8–22)
CALCIUM SERPL-MCNC: 8.9 MG/DL (ref 8.5–10.5)
CHLORIDE SERPL-SCNC: 103 MMOL/L (ref 96–112)
CO2 SERPL-SCNC: 25 MMOL/L (ref 20–33)
CREAT SERPL-MCNC: 1.35 MG/DL (ref 0.5–1.4)
GFR SERPLBLD CREATININE-BSD FMLA CKD-EPI: 52 ML/MIN/1.73 M 2
GLUCOSE SERPL-MCNC: 101 MG/DL (ref 65–99)
POTASSIUM SERPL-SCNC: 3.4 MMOL/L (ref 3.6–5.5)
SODIUM SERPL-SCNC: 137 MMOL/L (ref 135–145)

## 2022-12-11 PROCEDURE — 700111 HCHG RX REV CODE 636 W/ 250 OVERRIDE (IP): Performed by: STUDENT IN AN ORGANIZED HEALTH CARE EDUCATION/TRAINING PROGRAM

## 2022-12-11 PROCEDURE — 36415 COLL VENOUS BLD VENIPUNCTURE: CPT

## 2022-12-11 PROCEDURE — 700111 HCHG RX REV CODE 636 W/ 250 OVERRIDE (IP)

## 2022-12-11 PROCEDURE — 700102 HCHG RX REV CODE 250 W/ 637 OVERRIDE(OP): Performed by: INTERNAL MEDICINE

## 2022-12-11 PROCEDURE — A9270 NON-COVERED ITEM OR SERVICE: HCPCS

## 2022-12-11 PROCEDURE — A9270 NON-COVERED ITEM OR SERVICE: HCPCS | Performed by: STUDENT IN AN ORGANIZED HEALTH CARE EDUCATION/TRAINING PROGRAM

## 2022-12-11 PROCEDURE — 80048 BASIC METABOLIC PNL TOTAL CA: CPT

## 2022-12-11 PROCEDURE — 700102 HCHG RX REV CODE 250 W/ 637 OVERRIDE(OP): Performed by: STUDENT IN AN ORGANIZED HEALTH CARE EDUCATION/TRAINING PROGRAM

## 2022-12-11 PROCEDURE — A9270 NON-COVERED ITEM OR SERVICE: HCPCS | Performed by: INTERNAL MEDICINE

## 2022-12-11 PROCEDURE — 700102 HCHG RX REV CODE 250 W/ 637 OVERRIDE(OP)

## 2022-12-11 PROCEDURE — 770001 HCHG ROOM/CARE - MED/SURG/GYN PRIV*

## 2022-12-11 PROCEDURE — 99231 SBSQ HOSP IP/OBS SF/LOW 25: CPT | Performed by: INTERNAL MEDICINE

## 2022-12-11 RX ORDER — POTASSIUM CHLORIDE 20 MEQ/1
40 TABLET, EXTENDED RELEASE ORAL ONCE
Status: COMPLETED | OUTPATIENT
Start: 2022-12-11 | End: 2022-12-11

## 2022-12-11 RX ORDER — ENOXAPARIN SODIUM 100 MG/ML
40 INJECTION SUBCUTANEOUS DAILY
Status: DISCONTINUED | OUTPATIENT
Start: 2022-12-11 | End: 2022-12-14

## 2022-12-11 RX ADMIN — ASPIRIN 81 MG 81 MG: 81 TABLET ORAL at 17:47

## 2022-12-11 RX ADMIN — TAMSULOSIN HYDROCHLORIDE 0.4 MG: 0.4 CAPSULE ORAL at 17:47

## 2022-12-11 RX ADMIN — Medication: at 05:03

## 2022-12-11 RX ADMIN — ACETAMINOPHEN 1000 MG: 500 TABLET ORAL at 05:01

## 2022-12-11 RX ADMIN — BENAZEPRIL HYDROCHLORIDE 10 MG: 20 TABLET ORAL at 20:27

## 2022-12-11 RX ADMIN — ACETAMINOPHEN 1000 MG: 500 TABLET ORAL at 11:57

## 2022-12-11 RX ADMIN — ENOXAPARIN SODIUM 40 MG: 40 INJECTION SUBCUTANEOUS at 17:45

## 2022-12-11 RX ADMIN — POTASSIUM CHLORIDE 40 MEQ: 1500 TABLET, EXTENDED RELEASE ORAL at 09:45

## 2022-12-11 RX ADMIN — SENNOSIDES AND DOCUSATE SODIUM 2 TABLET: 50; 8.6 TABLET ORAL at 05:00

## 2022-12-11 RX ADMIN — ACETAMINOPHEN 1000 MG: 500 TABLET ORAL at 17:46

## 2022-12-11 RX ADMIN — Medication 1000 UNITS: at 05:00

## 2022-12-11 RX ADMIN — AMOXICILLIN AND CLAVULANATE POTASSIUM 1 TABLET: 875; 125 TABLET, FILM COATED ORAL at 17:46

## 2022-12-11 RX ADMIN — QUETIAPINE FUMARATE 200 MG: 200 TABLET, FILM COATED ORAL at 20:28

## 2022-12-11 RX ADMIN — ATORVASTATIN CALCIUM 40 MG: 40 TABLET, FILM COATED ORAL at 17:47

## 2022-12-11 RX ADMIN — AMOXICILLIN AND CLAVULANATE POTASSIUM 1 TABLET: 875; 125 TABLET, FILM COATED ORAL at 05:02

## 2022-12-11 RX ADMIN — FOLIC ACID 1 MG: 1 TABLET ORAL at 05:02

## 2022-12-11 RX ADMIN — LEVOTHYROXINE SODIUM 75 MCG: 0.07 TABLET ORAL at 05:00

## 2022-12-11 RX ADMIN — HEPARIN SODIUM 5000 UNITS: 5000 INJECTION, SOLUTION INTRAVENOUS; SUBCUTANEOUS at 05:02

## 2022-12-11 RX ADMIN — METOPROLOL TARTRATE 12.5 MG: 25 TABLET, FILM COATED ORAL at 17:46

## 2022-12-11 RX ADMIN — ONDANSETRON 4 MG: 2 INJECTION INTRAMUSCULAR; INTRAVENOUS at 05:02

## 2022-12-11 RX ADMIN — METOPROLOL TARTRATE 12.5 MG: 25 TABLET, FILM COATED ORAL at 05:00

## 2022-12-11 RX ADMIN — CLOPIDOGREL BISULFATE 75 MG: 75 TABLET ORAL at 05:00

## 2022-12-11 ASSESSMENT — PAIN DESCRIPTION - PAIN TYPE: TYPE: ACUTE PAIN

## 2022-12-11 NOTE — CARE PLAN
The patient is Stable - Low risk of patient condition declining or worsening    Problem: Knowledge Deficit - Standard  Goal: Patient and family/care givers will demonstrate understanding of plan of care, disease process/condition, diagnostic tests and medications  Outcome: Progressing     Problem: Pain - Standard  Goal: Alleviation of pain or a reduction in pain to the patient’s comfort goal  Outcome: Progressing     Problem: Skin Integrity  Goal: Skin integrity is maintained or improved  Outcome: Progressing     Problem: Fall Risk  Goal: Patient will remain free from falls  Outcome: Progressing     Problem: Optimal Care of the Stroke Patient  Goal: Optimal emergency care for the stroke patient  Outcome: Progressing  Goal: Optimal acute care for the stroke patient  Outcome: Progressing     Problem: Knowledge Deficit - Stroke Education  Goal: Patient's knowledge of stroke and risk factors will improve  Outcome: Progressing     Problem: Neuro Status  Goal: Neuro status will remain stable or improve  Outcome: Progressing     Problem: Mobility - Stroke  Goal: Patient's capacity to carry out activities will improve  Outcome: Progressing     Problem: Self Care  Goal: Patient will have the ability to perform ADLs independently or with assistance (bathe, groom, dress, toilet and feed)  Outcome: Progressing     Shift Goals  Clinical Goals: no falls  Patient Goals: comfort  Family Goals: SILVIO    Progress made toward(s) clinical / shift goals:  Pt resting well through shift and denies pain or discomfort.     Patient is not progressing towards the following goals:

## 2022-12-11 NOTE — CARE PLAN
Patient pain remains under control today with scheduled tylenol. Patient Ambulated to chair and back to bed with no falls. Tolerated well. No complications.      The patient is Stable - Low risk of patient condition declining or worsening    Shift Goals  Clinical Goals: No falls  Patient Goals: Up to chair for breakfast  Family Goals: SILVIO    Progress made toward(s) clinical / shift goals:       Problem: Pain - Standard  Goal: Alleviation of pain or a reduction in pain to the patient’s comfort goal  Outcome: Progressing     Problem: Fall Risk  Goal: Patient will remain free from falls  Outcome: Progressing     Problem: Mobility - Stroke  Goal: Patient's capacity to carry out activities will improve  Outcome: Progressing

## 2022-12-11 NOTE — CARE PLAN
Problem: Knowledge Deficit - Standard  Goal: Patient and family/care givers will demonstrate understanding of plan of care, disease process/condition, diagnostic tests and medications  Outcome: Not Progressing  Note: Frequent reminders  during care. Forgets messages , reorient as needed. Safety precautions maintained.     Problem: Fall Risk  Goal: Patient will remain free from falls  Outcome: Not Progressing   The patient is Stable - Low risk of patient condition declining or worsening    Shift Goals  Clinical Goals: no falls  Patient Goals: comfort  Family Goals: SILVIO    Progress made toward(s) clinical / shift goals:        Patient is not progressing towards the following goals:      Problem: Knowledge Deficit - Standard  Goal: Patient and family/care givers will demonstrate understanding of plan of care, disease process/condition, diagnostic tests and medications  Outcome: Not Progressing  Note: Frequent reminders  during care. Forgets messages , reorient as needed. Safety precautions maintained.     Problem: Fall Risk  Goal: Patient will remain free from falls  Outcome: Not Progressing
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
## 2022-12-12 PROBLEM — F03.90 DEMENTIA (HCC): Status: ACTIVE | Noted: 2022-12-12

## 2022-12-12 LAB
BACTERIA BLD CULT: NORMAL
BACTERIA BLD CULT: NORMAL
SIGNIFICANT IND 70042: NORMAL
SIGNIFICANT IND 70042: NORMAL
SITE SITE: NORMAL
SITE SITE: NORMAL
SOURCE SOURCE: NORMAL
SOURCE SOURCE: NORMAL

## 2022-12-12 PROCEDURE — A9270 NON-COVERED ITEM OR SERVICE: HCPCS

## 2022-12-12 PROCEDURE — 700102 HCHG RX REV CODE 250 W/ 637 OVERRIDE(OP): Performed by: INTERNAL MEDICINE

## 2022-12-12 PROCEDURE — 700102 HCHG RX REV CODE 250 W/ 637 OVERRIDE(OP): Performed by: STUDENT IN AN ORGANIZED HEALTH CARE EDUCATION/TRAINING PROGRAM

## 2022-12-12 PROCEDURE — 99231 SBSQ HOSP IP/OBS SF/LOW 25: CPT | Performed by: INTERNAL MEDICINE

## 2022-12-12 PROCEDURE — 700111 HCHG RX REV CODE 636 W/ 250 OVERRIDE (IP): Performed by: STUDENT IN AN ORGANIZED HEALTH CARE EDUCATION/TRAINING PROGRAM

## 2022-12-12 PROCEDURE — 770001 HCHG ROOM/CARE - MED/SURG/GYN PRIV*

## 2022-12-12 PROCEDURE — 700102 HCHG RX REV CODE 250 W/ 637 OVERRIDE(OP)

## 2022-12-12 PROCEDURE — A9270 NON-COVERED ITEM OR SERVICE: HCPCS | Performed by: STUDENT IN AN ORGANIZED HEALTH CARE EDUCATION/TRAINING PROGRAM

## 2022-12-12 PROCEDURE — A9270 NON-COVERED ITEM OR SERVICE: HCPCS | Performed by: INTERNAL MEDICINE

## 2022-12-12 RX ORDER — LEVOFLOXACIN 500 MG/1
500 TABLET, FILM COATED ORAL EVERY 24 HOURS
Status: DISCONTINUED | OUTPATIENT
Start: 2022-12-12 | End: 2022-12-12 | Stop reason: CLARIF

## 2022-12-12 RX ADMIN — ACETAMINOPHEN 1000 MG: 500 TABLET ORAL at 05:07

## 2022-12-12 RX ADMIN — METOPROLOL TARTRATE 12.5 MG: 25 TABLET, FILM COATED ORAL at 16:48

## 2022-12-12 RX ADMIN — TAMSULOSIN HYDROCHLORIDE 0.4 MG: 0.4 CAPSULE ORAL at 16:47

## 2022-12-12 RX ADMIN — METOPROLOL TARTRATE 12.5 MG: 25 TABLET, FILM COATED ORAL at 05:06

## 2022-12-12 RX ADMIN — CLOPIDOGREL BISULFATE 75 MG: 75 TABLET ORAL at 05:04

## 2022-12-12 RX ADMIN — ASPIRIN 81 MG 81 MG: 81 TABLET ORAL at 16:47

## 2022-12-12 RX ADMIN — Medication: at 05:09

## 2022-12-12 RX ADMIN — Medication 1000 UNITS: at 05:09

## 2022-12-12 RX ADMIN — ENOXAPARIN SODIUM 40 MG: 40 INJECTION SUBCUTANEOUS at 16:47

## 2022-12-12 RX ADMIN — SENNOSIDES AND DOCUSATE SODIUM 2 TABLET: 50; 8.6 TABLET ORAL at 16:48

## 2022-12-12 RX ADMIN — LEVOTHYROXINE SODIUM 75 MCG: 0.07 TABLET ORAL at 05:08

## 2022-12-12 RX ADMIN — FOLIC ACID 1 MG: 1 TABLET ORAL at 05:10

## 2022-12-12 RX ADMIN — ACETAMINOPHEN 1000 MG: 500 TABLET ORAL at 16:47

## 2022-12-12 RX ADMIN — ACETAMINOPHEN 1000 MG: 500 TABLET ORAL at 12:12

## 2022-12-12 RX ADMIN — BENAZEPRIL HYDROCHLORIDE 10 MG: 20 TABLET ORAL at 19:51

## 2022-12-12 RX ADMIN — QUETIAPINE FUMARATE 200 MG: 200 TABLET, FILM COATED ORAL at 19:10

## 2022-12-12 RX ADMIN — ACETAMINOPHEN 1000 MG: 500 TABLET ORAL at 23:12

## 2022-12-12 RX ADMIN — SENNOSIDES AND DOCUSATE SODIUM 2 TABLET: 50; 8.6 TABLET ORAL at 05:08

## 2022-12-12 RX ADMIN — ATORVASTATIN CALCIUM 40 MG: 40 TABLET, FILM COATED ORAL at 16:48

## 2022-12-12 NOTE — CARE PLAN
Problem: Pain - Standard  Goal: Alleviation of pain or a reduction in pain to the patient’s comfort goal  Outcome: Progressing  Note: No s/s of pain asleep.     Problem: Skin Integrity  Goal: Skin integrity is maintained or improved  Outcome: Progressing  Note: Skin intact with bruises. Monitored closely for changes     Problem: Pain - Standard  Goal: Alleviation of pain or a reduction in pain to the patient’s comfort goal  Outcome: Progressing  Note: No s/s of pain asleep.     Problem: Skin Integrity  Goal: Skin integrity is maintained or improved  Outcome: Progressing  Note: Skin intact with bruises. Monitored closely for changes     Problem: Skin Integrity  Goal: Skin integrity is maintained or improved  Outcome: Progressing  Note: Skin intact with bruises. Monitored closely for changes     Problem: Skin Integrity  Goal: Skin integrity is maintained or improved  Outcome: Progressing  Note: Skin intact with bruises. Monitored closely for changes   The patient is Stable - Low risk of patient condition declining or worsening    Shift Goals  Clinical Goals: No fall  Patient Goals: comfort  Family Goals: SILVIO    Progress made toward(s) clinical / shift goals:        Patient is not progressing towards the following goals:
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
## 2022-12-12 NOTE — DISCHARGE PLANNING
Received Choice Form @: 4196  Agency/ Facility Name: [facility]  Referral Sent per Choice Form @: 5764

## 2022-12-12 NOTE — ASSESSMENT & PLAN NOTE
Multifactorial, unclear progression. Delirium resolved, MOCA 7 pnts, patient seems to be at baseline.   - To follow outpt   - Going to SNF  - Palliative care consulted for goals of care discussion with Juventino
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
## 2022-12-12 NOTE — DISCHARGE PLANNING
Case Management Discharge Planning    Admission Date: 12/5/2022  GMLOS: 5  ALOS: 7    6-Clicks ADL Score: 20  6-Clicks Mobility Score: 18      Anticipated Discharge Dispo:  SNF pending    DME Needed: none    Action(s) Taken: Received a message from Dr. Estrella that she talked to pt's niece and was agreeable with SNF placement. Although at 245pm: MD said that they have placed an order for palliative care as well as the niece wanted a conversation with them.     CM attempted to call niece Thania but there was no answer.     [facility] [phone]    Escalations Completed: no    Medically Clear: no    Next Steps: follow up with MD tomorrow and follow up with SNF.     Barriers to Discharge: pending SNF placement and also conversation with niece and palliative care.     Is the patient up for discharge tomorrow: hopefully.

## 2022-12-13 PROCEDURE — A9270 NON-COVERED ITEM OR SERVICE: HCPCS | Performed by: INTERNAL MEDICINE

## 2022-12-13 PROCEDURE — 700102 HCHG RX REV CODE 250 W/ 637 OVERRIDE(OP): Performed by: INTERNAL MEDICINE

## 2022-12-13 PROCEDURE — 770001 HCHG ROOM/CARE - MED/SURG/GYN PRIV*

## 2022-12-13 PROCEDURE — 99231 SBSQ HOSP IP/OBS SF/LOW 25: CPT | Performed by: INTERNAL MEDICINE

## 2022-12-13 PROCEDURE — 700102 HCHG RX REV CODE 250 W/ 637 OVERRIDE(OP): Performed by: STUDENT IN AN ORGANIZED HEALTH CARE EDUCATION/TRAINING PROGRAM

## 2022-12-13 PROCEDURE — 700111 HCHG RX REV CODE 636 W/ 250 OVERRIDE (IP): Performed by: STUDENT IN AN ORGANIZED HEALTH CARE EDUCATION/TRAINING PROGRAM

## 2022-12-13 PROCEDURE — A9270 NON-COVERED ITEM OR SERVICE: HCPCS | Performed by: STUDENT IN AN ORGANIZED HEALTH CARE EDUCATION/TRAINING PROGRAM

## 2022-12-13 PROCEDURE — A9270 NON-COVERED ITEM OR SERVICE: HCPCS

## 2022-12-13 PROCEDURE — 700102 HCHG RX REV CODE 250 W/ 637 OVERRIDE(OP)

## 2022-12-13 RX ADMIN — BENAZEPRIL HYDROCHLORIDE 10 MG: 20 TABLET ORAL at 20:54

## 2022-12-13 RX ADMIN — CLOPIDOGREL BISULFATE 75 MG: 75 TABLET ORAL at 04:18

## 2022-12-13 RX ADMIN — ACETAMINOPHEN 1000 MG: 500 TABLET ORAL at 12:24

## 2022-12-13 RX ADMIN — ENOXAPARIN SODIUM 40 MG: 40 INJECTION SUBCUTANEOUS at 17:46

## 2022-12-13 RX ADMIN — METOPROLOL TARTRATE 12.5 MG: 25 TABLET, FILM COATED ORAL at 04:18

## 2022-12-13 RX ADMIN — TAMSULOSIN HYDROCHLORIDE 0.4 MG: 0.4 CAPSULE ORAL at 17:45

## 2022-12-13 RX ADMIN — LEVOTHYROXINE SODIUM 75 MCG: 0.07 TABLET ORAL at 04:18

## 2022-12-13 RX ADMIN — SENNOSIDES AND DOCUSATE SODIUM 2 TABLET: 50; 8.6 TABLET ORAL at 04:17

## 2022-12-13 RX ADMIN — FOLIC ACID 1 MG: 1 TABLET ORAL at 04:18

## 2022-12-13 RX ADMIN — ACETAMINOPHEN 1000 MG: 500 TABLET ORAL at 04:18

## 2022-12-13 RX ADMIN — ASPIRIN 81 MG 81 MG: 81 TABLET ORAL at 17:45

## 2022-12-13 RX ADMIN — Medication 1000 UNITS: at 04:18

## 2022-12-13 RX ADMIN — ACETAMINOPHEN 1000 MG: 500 TABLET ORAL at 23:42

## 2022-12-13 RX ADMIN — Medication: at 04:18

## 2022-12-13 RX ADMIN — QUETIAPINE FUMARATE 200 MG: 200 TABLET, FILM COATED ORAL at 17:46

## 2022-12-13 RX ADMIN — ACETAMINOPHEN 1000 MG: 500 TABLET ORAL at 17:45

## 2022-12-13 RX ADMIN — ATORVASTATIN CALCIUM 40 MG: 40 TABLET, FILM COATED ORAL at 17:45

## 2022-12-13 ASSESSMENT — PATIENT HEALTH QUESTIONNAIRE - PHQ9
SUM OF ALL RESPONSES TO PHQ9 QUESTIONS 1 AND 2: 0
1. LITTLE INTEREST OR PLEASURE IN DOING THINGS: NOT AT ALL
2. FEELING DOWN, DEPRESSED, IRRITABLE, OR HOPELESS: NOT AT ALL

## 2022-12-13 ASSESSMENT — PAIN SCALES - WONG BAKER: WONGBAKER_NUMERICALRESPONSE: DOESN'T HURT AT ALL

## 2022-12-13 ASSESSMENT — PAIN DESCRIPTION - PAIN TYPE: TYPE: ACUTE PAIN

## 2022-12-13 NOTE — ASSESSMENT & PLAN NOTE
Likely 2/2 folate deficiency 2/2 alcohol use  - RUQ US showed hepatic steatosis vs early cirrhosis  - Continue folate multivitamins

## 2022-12-13 NOTE — CARE PLAN
Patient up to chair for breakfast and lunch. Ambulated to bathroom and down the philip today, tolerated well with walker and one assist. Remains free of falls. No complaints of pain today. Eager to leave hospital. No complications.      The patient is Stable - Low risk of patient condition declining or worsening    Shift Goals  Clinical Goals: Up to chair, No falls  Patient Goals: Comfort  Family Goals: SILVIO    Progress made toward(s) clinical / shift goals:         Problem: Pain - Standard  Goal: Alleviation of pain or a reduction in pain to the patient’s comfort goal  Outcome: Progressing     Problem: Fall Risk  Goal: Patient will remain free from falls  Outcome: Progressing     Problem: Mobility - Stroke  Goal: Patient's capacity to carry out activities will improve  Outcome: Progressing

## 2022-12-13 NOTE — PROGRESS NOTES
R Internal Medicine Daily Progress Note    Date of Service  12/13/2022    [facility] Team: [facility] IM Purple Team   Attending: Jeni Peña M.d.  Senior Resident: Dr. Lenz  Intern:  Dr. Estrella  Contact Number: [phone]    Chief Complaint  82 YOM with PMHx of HTN, hypothyroidism, dementia presenting by EMS on 12/5 after multiple GLFs over past few days. UA positive for UTI, CXR concerning for possible RLL pneumonia (finished Unasyn on 12/11). Now pending placement.    Hospital Course  No notes on file    Interval Problem Update  NAEON. Pt is eager to go home and wants to get out of hospital - informed patient that his niece Thania planned on coming this afternoon for group meeting to discuss GOC and next steps for him. Pt appeared eager for his niece to come. He denied any other active issues or complaints. Currently working on SNF placement at this time.    I have discussed this patient's plan of care and discharge plan at IDT rounds today with Case Management, Nursing, Nursing leadership, and other members of the IDT team.    Consultants/Specialty  palliative care    Code Status  Full Code    Disposition  Patient is medically cleared pending placement  for discharge.   Anticipate discharge to to skilled nursing facility.  I have placed the appropriate orders for post-discharge needs.    Review of Systems  Review of Systems   Unable to perform ROS: Dementia      Physical Exam  Temp:  [36.4 °C (97.5 °F)-36.6 °C (97.8 °F)] 36.4 °C (97.6 °F)  Pulse:  [46-80] 46  Resp:  [18-20] 20  BP: (123-147)/(68-75) 140/68  SpO2:  [91 %-93 %] 92 %    Physical Exam  Constitutional:       General: He is not in acute distress.     Appearance: Normal appearance. He is not ill-appearing.   HENT:      Head: Normocephalic and atraumatic.      Mouth/Throat:      Mouth: Mucous membranes are moist.   Eyes:      Extraocular Movements: Extraocular movements intact.      Conjunctiva/sclera: Conjunctivae normal.   Cardiovascular:      Rate and Rhythm:  Normal rate and regular rhythm.      Heart sounds: Normal heart sounds. No murmur heard.    No friction rub. No gallop.   Pulmonary:      Effort: Pulmonary effort is normal. No respiratory distress.      Breath sounds: Normal breath sounds. No wheezing or rales.   Abdominal:      General: Bowel sounds are normal. There is no distension.      Palpations: Abdomen is soft.      Tenderness: There is no abdominal tenderness.   Musculoskeletal:      Right lower leg: No edema.      Left lower leg: No edema.   Neurological:      Mental Status: He is alert. Mental status is at baseline. He is disoriented.      Cranial Nerves: Cranial nerve deficit (mild L ptosis) present.      Sensory: No sensory deficit.      Motor: No weakness.      Gait: Gait abnormal (unstead).      Comments: A&O x 2 (self, place), word-finding difficulty   Psychiatric:      Comments: Unable to assess 2/2 dementia       Fluids    Intake/Output Summary (Last 24 hours) at 12/13/2022 1335  Last data filed at 12/13/2022 0402  Gross per 24 hour   Intake --   Output 650 ml   Net -650 ml       Laboratory      Recent Labs     12/11/22  0336   SODIUM 137   POTASSIUM 3.4*   CHLORIDE 103   CO2 25   GLUCOSE 101*   BUN 12   CREATININE 1.35   CALCIUM 8.9                   Imaging  EC-ECHOCARDIOGRAM COMPLETE W/O CONT   Final Result      US-RUQ   Final Result      1.  Heterogenous hepatic echotexture, possibly hepatic steatosis versus early cirrhosis.   2.  Hepatomegaly.   3.  Cholelithiasis without sonographic evidence of acute pleural status      DX-HIP-UNILATERAL-WITH PELVIS-1 VIEW LEFT   Final Result      No acute osseous abnormality. Left hip arthroplasty is well seated.      DX-CHEST-PORTABLE (1 VIEW)   Final Result      Chronic elevation of the right hemidiaphragm with right basilar opacity, likely atelectasis.         CT-HEAD W/O   Final Result      1. No CT evidence of acute infarct, hemorrhage or mass.   2. Mild to moderate global parenchymal atrophy. Chronic  small vessel ischemic changes.   3. Mild to moderate ethmoid sinus disease.      MR-BRAIN-W/O    (Results Pending)        Assessment/Plan  Problem Representation:    * Dementia ([facility])  Assessment & Plan  Multifactorial, unclear progression. Delirium resolved, appears to be at baseline.   - MOCA 6 out of 30 on 12/6  - Family discussing pending with Palliative  - Plan for SNF    Frequent falls  Assessment & Plan  + generallized weakness. #5 unwitnessed GLF.   No focal neurologic deficit. Speech is slurred and patient is disoriented in time, unclear baseline. CT head without acute changes other than sinusitis. Possible delirium from ongoing infectious process, however, possible missed stroke facilitating aspiration and UTI. No substance use.   - MRI brain pending  - Statin, aspirin, heparin as above  - Aspiration precautions   - Fall precautions   - Seizure precautions   - Pending SNF evaluations    Sepsis ([facility])  Assessment & Plan  SIRS given fever,tachypnea, tachycardia. Also left shift and thrombocytopenia. Source was likely UTI + early aspiration pneumonia. Urine culture with Klebsiella pneumoniae. A lesion suggesting vegetation was seen in the AV in the TTE, however very low suspicion based on clinical improvement, able to transition to oral antibiotics which he completed. Pt is hemodynamically stable, RANDAL resolved, SIRS resolved.   - Augmentin completed, now resolved    NSTEMI (non-ST elevated myocardial infarction) ([facility])  Assessment & Plan  Elevated troponin + borderline ST depression in septal and lateral leads. No chest pain, hemodynamically stable. Possible type 2 MI in the setting of sepsis. He had a C in 2010 with diffuse disease in obtuse marginal, LAD 50-60% stenosis. TTE in 2014 showed LV concentric hyperthrophy but normal LVEF.   - No intervention recommended by Cardiology  - Continue DAPT, statin, and DVT prophylaxis  - Telemetry    Community acquired pneumonia of right lower lobe of lung- (present on  admission)  Assessment & Plan  Under the suspicion of missed stroke, possibly he had an aspiration episode and early consolidation vs. dense right basal atelectasis. No hypoxia, + procal with SIRS.   No respiratory distress.   - Continue ceftriaxone   - Continue azithromycin  - Aspiration precautions   - Incentive Spirometry    Hypothyroid- (present on admission)  Assessment & Plan  Last TSH many years ago. Has bee on 88 mcg of LVTX.   - New TSH and T4 as it might be contributing to ongoing presentation    CKD (chronic kidney disease) stage 3, GFR 30-59 ml/min (HCC)- (present on admission)  Assessment & Plan  Stage IIIb, possibly RANDAL on CKD given dehydration.   - IVF - LR   - Monitor     Delirium  Assessment & Plan  Now resolved    Thrombocytopenia (HCC)  Assessment & Plan  Likely 2/2 folate deficiency 2/2 alcohol use  - RUQ US showed hepatic steatosis vs early cirrhosis  - Continue folate multivitamins    Hypokalemia  Assessment & Plan  Now resolved    UTI (urinary tract infection)  Assessment & Plan  Patient with acute LUTS as described + inflammatory changes in UA, significant piuria and + nitrites. Possible underlying prostate condition and constipation favoring infection. Suspected pyelonephritis given SIRS and flank pain.   - UCx positive for Klebsiella, finished treatment       VTE prophylaxis: enoxaparin ppx    I have performed a physical exam and reviewed and updated ROS and Plan today (12/13/2022). In review of yesterday's note (12/12/2022), there are no changes except as documented above.

## 2022-12-13 NOTE — DOCUMENTATION QUERY
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 Can the acuity be specified?     NOTE:  If an appropriate response is not listed below, please respond with a new note.    The patient's Clinical Indicators include:  U/S Cardiac ECHO noted mildly reduced LVSF, grade I diastolic dysfunction, and an LVEF of 50%. Troponin T: 63 on admission now 267 on . No BNP drawn. No lower extremity edema noted in the Progress Notes.     Treatments include: Metoprolol, Cardiology Consult, and Imaging.    Risk factors include: dx Type II NSTEMI, dx Aspiration PNA, dx UTI, dx RANDAL, hx HTN + CKD 3 + CHF, and Advanced Age.    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

## 2022-12-13 NOTE — CARE PLAN
Problem: Knowledge Deficit - Standard  Goal: Patient and family/care givers will demonstrate understanding of plan of care, disease process/condition, diagnostic tests and medications  Outcome: Not Progressing   The patient is Stable - Low risk of patient condition declining or worsening    Shift Goals  Clinical Goals: Rest/Monotor Vitals/Neuro Checks/Safety  Patient Goals: Comfort & rest  Family Goals: SILVIO    Progress made toward(s) clinical / shift goals: Pt not complaining of any pain and turns self appropriately in bed. No falls this shift.     Patient is not progressing towards the following goals: N/A      Problem: Knowledge Deficit - Standard  Goal: Patient and family/care givers will demonstrate understanding of plan of care, disease process/condition, diagnostic tests and medications  Outcome: Not Progressing

## 2022-12-14 PROCEDURE — A9270 NON-COVERED ITEM OR SERVICE: HCPCS | Performed by: INTERNAL MEDICINE

## 2022-12-14 PROCEDURE — 99232 SBSQ HOSP IP/OBS MODERATE 35: CPT | Performed by: INTERNAL MEDICINE

## 2022-12-14 PROCEDURE — A9270 NON-COVERED ITEM OR SERVICE: HCPCS

## 2022-12-14 PROCEDURE — A9270 NON-COVERED ITEM OR SERVICE: HCPCS | Performed by: STUDENT IN AN ORGANIZED HEALTH CARE EDUCATION/TRAINING PROGRAM

## 2022-12-14 PROCEDURE — 700102 HCHG RX REV CODE 250 W/ 637 OVERRIDE(OP): Performed by: STUDENT IN AN ORGANIZED HEALTH CARE EDUCATION/TRAINING PROGRAM

## 2022-12-14 PROCEDURE — 770004 HCHG ROOM/CARE - ONCOLOGY PRIVATE *

## 2022-12-14 PROCEDURE — 700102 HCHG RX REV CODE 250 W/ 637 OVERRIDE(OP): Performed by: INTERNAL MEDICINE

## 2022-12-14 PROCEDURE — 700102 HCHG RX REV CODE 250 W/ 637 OVERRIDE(OP)

## 2022-12-14 RX ORDER — ATROPINE SULFATE 10 MG/ML
2 SOLUTION/ DROPS OPHTHALMIC EVERY 4 HOURS PRN
Status: DISCONTINUED | OUTPATIENT
Start: 2022-12-14 | End: 2022-12-29 | Stop reason: HOSPADM

## 2022-12-14 RX ORDER — LISINOPRIL 5 MG/1
5 TABLET ORAL
Status: DISCONTINUED | OUTPATIENT
Start: 2022-12-14 | End: 2022-12-14

## 2022-12-14 RX ADMIN — CLOPIDOGREL BISULFATE 75 MG: 75 TABLET ORAL at 05:32

## 2022-12-14 RX ADMIN — ACETAMINOPHEN 1000 MG: 500 TABLET ORAL at 05:32

## 2022-12-14 RX ADMIN — Medication 1000 UNITS: at 05:32

## 2022-12-14 RX ADMIN — QUETIAPINE FUMARATE 200 MG: 200 TABLET, FILM COATED ORAL at 21:35

## 2022-12-14 RX ADMIN — SENNOSIDES AND DOCUSATE SODIUM 2 TABLET: 50; 8.6 TABLET ORAL at 17:40

## 2022-12-14 RX ADMIN — FOLIC ACID 1 MG: 1 TABLET ORAL at 05:32

## 2022-12-14 RX ADMIN — Medication: at 05:33

## 2022-12-14 RX ADMIN — BENAZEPRIL HYDROCHLORIDE 10 MG: 20 TABLET ORAL at 21:35

## 2022-12-14 RX ADMIN — TAMSULOSIN HYDROCHLORIDE 0.4 MG: 0.4 CAPSULE ORAL at 17:40

## 2022-12-14 RX ADMIN — LEVOTHYROXINE SODIUM 75 MCG: 0.07 TABLET ORAL at 05:33

## 2022-12-14 RX ADMIN — METOPROLOL TARTRATE 12.5 MG: 25 TABLET, FILM COATED ORAL at 17:40

## 2022-12-14 RX ADMIN — SENNOSIDES AND DOCUSATE SODIUM 2 TABLET: 50; 8.6 TABLET ORAL at 05:32

## 2022-12-14 ASSESSMENT — FIBROSIS 4 INDEX: FIB4 SCORE: 5.2

## 2022-12-14 ASSESSMENT — PATIENT HEALTH QUESTIONNAIRE - PHQ9
1. LITTLE INTEREST OR PLEASURE IN DOING THINGS: NOT AT ALL
SUM OF ALL RESPONSES TO PHQ9 QUESTIONS 1 AND 2: 0
2. FEELING DOWN, DEPRESSED, IRRITABLE, OR HOPELESS: NOT AT ALL

## 2022-12-14 ASSESSMENT — PAIN DESCRIPTION - PAIN TYPE
TYPE: ACUTE PAIN
TYPE: ACUTE PAIN

## 2022-12-14 NOTE — CONSULTS
Reason for PC Consult: Advance Care Planning    Consulted by:   Dr. Estrella    Assessment:  General:   82 year old male admitted for pneumonia on 12/5/22. Pt has a history of arrhythmia, arthritis, chronic back pain, diverticulitis, CHF, thyroid disorder, CKD III, hard of hearing and dementia. Pt presented to [facility] ED via EMS following a ground level fall at home.     Prior PC Consult:   None on File    Social:   Pt lives with his Niece, Thania. She is pt's primary care giver, pt requires moderate to max assistance with ADLs. Pt has no short term memory, he is unable to bath and dress himself appropriately, he is unable to handle the mechanics of toileting, he is incontinent of bowel and bladder. Pt needs to use the walker to ambulate but does not.     Dyspnea: No, 93% on RA  Last BM: 12/13/22    Pain: No    Depression: No   Dementia: Yes;  6, E    Spiritual:  Is Rastafari or spirituality important for coping with this illness? No, Family declined visit from   Has a  or spiritual provider visit been requested? No    Palliative Performance Scale: 30%    Advance Directive: None on File  DPOA: None on File  POLST: None on File    To locate the AD/POLST, please hover the cursor over the patient's code status to find all linked ACP documents.    Code Status: Full     Outcome:  PC RN with Dr. Peña met with Thania and pt at bedside. Took meeting to family waiting waiting room. Dr. Peña discussed clinical picture, prognosis and recommendations.     Discussed the progressive and terminal nature of dementia, Thania reports an overall decline within the last 7-8 months. Pt has been dressing not appropriate for the weather, he is incontinent of bowel and bladder, he has no short term member and poor long term memory, he doesn't remember things correctly or accurately.     Thania reports being overwhelmed with caregiver fatigue. She is not equipped nor able to care for pt in his functional decline. She works night shift  so pt is alone most of the day when he is awake. Thania does laundry and has noticed the pt being incontinent of both bowel and bladder, he does always clean up well or handle the mechanics of toileting.     Hospice Discussion: Discussed hospice in detail, philosophy, goals and support provided. Discussed clinical appropriateness of hospice, explored beliefs, values and preferences surround hospice services. Answered questions, address concerns and clarified/reiterated points regarding hospice. Educated on hospice referral, timing and potential DC plan.     Thania states pt had [facility], she would like [facility]. Educated that it is now [facility] and [facility]. Thania verbalized agreement with sending a referral to [facility].    Obtained hospice choice for [facility], faxed to [facility].    Thania states she cannot physically care for pt at home on hospice, she still works and is not trained to do so. Discussed comfort focused care now while awaiting placement with hospice. Discussed GH vs SNF vs residential with hospice, Thania would like pt to be screened for [place] Medicaid.     Advanced Care Planning Documents: POLST form discussed, code status discussed and educated on potential situation. Framed discussion within current clinical setting, explored pt's goals with CPR and discussed how interventions and treatments are intended to bridge pt into wellness.     Completed POLST for DNR, comfort focused treatment and no feeding tubes. Original tapped to white board, a copy will be scanned into EMR.    To locate the AD/POLST, please hover the cursor over the patient's code status to find all linked ACP documents.    Active listening, education, validation, normalization, therapeutic touch, and emotional support provided throughout encounter.    Updated:   PC Team, HCM    Plan:   Comfort focused care now, [facility] to evaluate pt for services. Pt needs long term placement and [place] Medicaid.     Thank you  for allowing Palliative Care to participate in this patient's care. Please feel free to call [phone] with any questions or concerns.

## 2022-12-14 NOTE — CARE PLAN
The patient is Stable - Low risk of patient condition declining or worsening    Shift Goals  Clinical Goals: safety, snf palacement  Patient Goals: comfort  Family Goals: SILVIO    Progress made toward(s) clinical / shift goals:    Problem: Pain - Standard  Goal: Alleviation of pain or a reduction in pain to the patient’s comfort goal  Outcome: Progressing:  Patient has been pain free throughout this shift via plan for pain management     Problem: Fall Risk  Goal: Patient will remain free from falls  Outcome: Progressing:  Patient has remained free from falls throughout this shift. Patient is educated and aware of the risks of falls and the importance of the use of call light     Problem: Neuro Status  Goal: Neuro status will remain stable or improve  Outcome: Progressing:  Patient's neuro status has remained stable. Patient is alert and oriented and neuro checks have been negative

## 2022-12-14 NOTE — CARE PLAN
Patient remains free of falls today. Ambulated multiple times to the bathroom and down the philip and back. Tolerated very well. Skin integrity maintained, kimberlyn with frequent movement and self positioning in bed. No complaints of pain. No complications.      The patient is Stable - Low risk of patient condition declining or worsening    Shift Goals  Clinical Goals: Fall free  Patient Goals: Walk the halls  Family Goals: SILVIO    Progress made toward(s) clinical / shift goals:        Problem: Pain - Standard  Goal: Alleviation of pain or a reduction in pain to the patient’s comfort goal  Outcome: Progressing     Problem: Skin Integrity  Goal: Skin integrity is maintained or improved  Outcome: Progressing     Problem: Fall Risk  Goal: Patient will remain free from falls  Outcome: Progressing     Problem: Mobility - Stroke  Goal: Patient's capacity to carry out activities will improve  Outcome: Progressing

## 2022-12-14 NOTE — DISCHARGE PLANNING
Received Choice form at 1418  Agency/Facility Name: [facility]   Referral sent per Choice form @ NOT SENT

## 2022-12-15 PROBLEM — D69.6 THROMBOCYTOPENIA (HCC): Status: RESOLVED | Noted: 2022-12-05 | Resolved: 2022-12-15

## 2022-12-15 PROBLEM — K59.04 CHRONIC IDIOPATHIC CONSTIPATION: Status: RESOLVED | Noted: 2022-12-06 | Resolved: 2022-12-15

## 2022-12-15 PROBLEM — R41.0 DELIRIUM: Status: RESOLVED | Noted: 2022-12-06 | Resolved: 2022-12-15

## 2022-12-15 PROBLEM — E87.6 HYPOKALEMIA: Status: RESOLVED | Noted: 2022-12-05 | Resolved: 2022-12-15

## 2022-12-15 PROBLEM — R19.7 DIARRHEA OF PRESUMED INFECTIOUS ORIGIN: Status: ACTIVE | Noted: 2022-12-15

## 2022-12-15 PROBLEM — R73.03 PREDIABETES: Status: RESOLVED | Noted: 2022-12-05 | Resolved: 2022-12-15

## 2022-12-15 PROCEDURE — 700102 HCHG RX REV CODE 250 W/ 637 OVERRIDE(OP): Performed by: STUDENT IN AN ORGANIZED HEALTH CARE EDUCATION/TRAINING PROGRAM

## 2022-12-15 PROCEDURE — A9270 NON-COVERED ITEM OR SERVICE: HCPCS | Performed by: STUDENT IN AN ORGANIZED HEALTH CARE EDUCATION/TRAINING PROGRAM

## 2022-12-15 PROCEDURE — 99232 SBSQ HOSP IP/OBS MODERATE 35: CPT | Performed by: INTERNAL MEDICINE

## 2022-12-15 PROCEDURE — A9270 NON-COVERED ITEM OR SERVICE: HCPCS

## 2022-12-15 PROCEDURE — 770004 HCHG ROOM/CARE - ONCOLOGY PRIVATE *

## 2022-12-15 PROCEDURE — 700102 HCHG RX REV CODE 250 W/ 637 OVERRIDE(OP)

## 2022-12-15 RX ADMIN — METOPROLOL TARTRATE 12.5 MG: 25 TABLET, FILM COATED ORAL at 16:51

## 2022-12-15 RX ADMIN — LEVOTHYROXINE SODIUM 75 MCG: 0.07 TABLET ORAL at 05:17

## 2022-12-15 RX ADMIN — QUETIAPINE FUMARATE 200 MG: 200 TABLET, FILM COATED ORAL at 16:51

## 2022-12-15 RX ADMIN — TAMSULOSIN HYDROCHLORIDE 0.4 MG: 0.4 CAPSULE ORAL at 16:51

## 2022-12-15 RX ADMIN — SENNOSIDES AND DOCUSATE SODIUM 2 TABLET: 50; 8.6 TABLET ORAL at 05:17

## 2022-12-15 RX ADMIN — METOPROLOL TARTRATE 12.5 MG: 25 TABLET, FILM COATED ORAL at 05:17

## 2022-12-15 ASSESSMENT — COGNITIVE AND FUNCTIONAL STATUS - GENERAL
SUGGESTED CMS G CODE MODIFIER MOBILITY: CK
MOBILITY SCORE: 18
TOILETING: A LITTLE
MOVING TO AND FROM BED TO CHAIR: A LITTLE
SUGGESTED CMS G CODE MODIFIER DAILY ACTIVITY: CJ
DAILY ACTIVITIY SCORE: 20
DRESSING REGULAR UPPER BODY CLOTHING: A LITTLE
DRESSING REGULAR LOWER BODY CLOTHING: A LITTLE
TURNING FROM BACK TO SIDE WHILE IN FLAT BAD: A LITTLE
HELP NEEDED FOR BATHING: A LITTLE
STANDING UP FROM CHAIR USING ARMS: A LITTLE
WALKING IN HOSPITAL ROOM: A LITTLE
CLIMB 3 TO 5 STEPS WITH RAILING: A LITTLE
MOVING FROM LYING ON BACK TO SITTING ON SIDE OF FLAT BED: A LITTLE

## 2022-12-15 ASSESSMENT — PAIN DESCRIPTION - PAIN TYPE: TYPE: ACUTE PAIN

## 2022-12-15 NOTE — DISCHARGE PLANNING
Received Choice form at 1423  Agency/Facility Name: [facility] ([facility])  Referral sent per Choice form @ 0806     0832:  Agency/Facility Name: [facility] ([facility])   Spoke To: Lexy   Outcome: Pt accepted. Lexy will try to get in contact with the caregiver and she will also work on placement for the pt.

## 2022-12-15 NOTE — PROGRESS NOTES
Hospital Medicine Daily Progress Note    Date of Service  12/15/2022    Chief Complaint  Sina Lewis is a 82 y.o. male admitted 12/5/2022 with weakness and multiple ground-level falls, and inability to get himself up after falling. He has a history of atrial fibrillation, CKD stage III, hypertension, hypothyroidism, dyslipidemia, and tobacco use.    Hospital Course  On admission, patient was febrile and hypoxic.  UA was consistent with UTI.  Chest x-ray showed consolidation.  Patient was started on empiric antibiotic therapy presumed for UTI and aspiration.  There was a concern for prior stroke due to dysarthria and mild left facial droop.  Patient had dementia with neurological deficits. CT head from admission showed no evidence of acute infarct, hemorrhage or mass.    Patient had complained of chest pain.  Troponin was elevated.  EKG showed borderline ST depression in septal and lateral leads. Echocardiogram from 12/7/2022 showed mildly low LV systolic function with LVEF of about 50%. Noted to have a small mobile echodensity on the aortic valve, unclear if vegetation or plaque. Cardiology were consulted.  SUZY showed possible AV vegetation.  ID and cardiology were consulted.   Blood cultures were negative.  Patient was hemodynamically stable.  No further work-up was recommended by ID and cardiology.    Goals of care discussion was held on 12/14/2022.  Decision was made for hospice, and patient was transitioned to comfort care      Interval Problem Update  Reports of frequent loose stool.  C. difficile ordered.    I have discussed this patient's plan of care and discharge plan at IDT rounds today with Case Management, Nursing, Nursing leadership, and other members of the IDT team.    Consultants/Specialty  None    Code Status  Comfort Care/DNR    Disposition  Patient is medically cleared for discharge.   Anticipate discharge to to hospice.  I have placed the appropriate orders for post-discharge  needs.    Review of Systems  Review of Systems   Unable to perform ROS: Dementia      Physical Exam  Temp:  [36.3 °C (97.3 °F)-36.8 °C (98.3 °F)] 36.8 °C (98.3 °F)  Pulse:  [58-70] 70  Resp:  [17-18] 18  BP: (118-145)/(68-75) 118/68  SpO2:  [93 %] 93 %    Physical Exam  Constitutional:       General: He is not in acute distress.     Appearance: He is ill-appearing.   HENT:      Head: Normocephalic.      Nose: Nose normal.      Mouth/Throat:      Mouth: Mucous membranes are moist.   Eyes:      Pupils: Pupils are equal, round, and reactive to light.   Cardiovascular:      Rate and Rhythm: Normal rate.   Pulmonary:      Effort: Pulmonary effort is normal.   Abdominal:      Palpations: Abdomen is soft.   Musculoskeletal:      Cervical back: Normal range of motion.   Skin:     General: Skin is warm.   Neurological:      Mental Status: He is alert. He is disoriented.   Psychiatric:      Comments: Calm, confused       Fluids    Intake/Output Summary (Last 24 hours) at 12/15/2022 1216  Last data filed at 12/14/2022 2234  Gross per 24 hour   Intake --   Output 400 ml   Net -400 ml       Laboratory                        Imaging  EC-ECHOCARDIOGRAM COMPLETE W/O CONT   Final Result      US-RUQ   Final Result      1.  Heterogenous hepatic echotexture, possibly hepatic steatosis versus early cirrhosis.   2.  Hepatomegaly.   3.  Cholelithiasis without sonographic evidence of acute pleural status      DX-HIP-UNILATERAL-WITH PELVIS-1 VIEW LEFT   Final Result      No acute osseous abnormality. Left hip arthroplasty is well seated.      DX-CHEST-PORTABLE (1 VIEW)   Final Result      Chronic elevation of the right hemidiaphragm with right basilar opacity, likely atelectasis.         CT-HEAD W/O   Final Result      1. No CT evidence of acute infarct, hemorrhage or mass.   2. Mild to moderate global parenchymal atrophy. Chronic small vessel ischemic changes.   3. Mild to moderate ethmoid sinus disease.           Assessment/Plan  *  Dementia ([facility])  Assessment & Plan  On comfort care.    Diarrhea of presumed infectious origin  Assessment & Plan  Multiple loose bowel movement.  Stool for C. difficile ordered.    Frequent falls  Assessment & Plan  History of multiple falls.  On comfort care    UTI (urinary tract infection)  Assessment & Plan  Completed antibiotic treatment.  On comfort care    Sepsis ([facility])  Assessment & Plan  Resolved.  On comfort care    NSTEMI (non-ST elevated myocardial infarction) ([facility])  Assessment & Plan  Elevated troponin with borderline ST depression in septal and lateral leads.  SUZY showed possible AV vegetation.  On comfort care    Community acquired pneumonia of right lower lobe of lung- (present on admission)  Assessment & Plan  Completed course of antibiotics.  On comfort care    Hypothyroid- (present on admission)  Assessment & Plan  Transitioned to comfort care    CKD (chronic kidney disease) stage 3, GFR 30-59 ml/min ([facility])- (present on admission)  Assessment & Plan  On comfort care       VTE prophylaxis: Comfort care

## 2022-12-15 NOTE — THERAPY
Physical Therapy Contact Note    Patient Name: Sina Lewis  Age:  82 y.o., Sex:  male  Medical Record #: [id number]  Today's Date: 12/15/2022    Per chart has transitoned to comfort care. Will d/c PT orders.    Bimal Kaiser PT, DPT

## 2022-12-15 NOTE — THERAPY
SLP contact note:    Patient Name: Sina Lewis  Age:  82 y.o., Sex:  male  Medical Record #: [id number]  Today's Date: 12/15/2022       12/15/22 0736   Interdisciplinary Plan of Care Collaboration   Collaboration Comments Patient has transitioned to comfort care. No further skilled SLP services indicated at this time given goal of care. Please re-consult SLP services with any change in status or SLP needs. Thank you.
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
## 2022-12-15 NOTE — THERAPY
Occupational Therapy Note    Patient Name: Sina Lewis  Age:  82 y.o., Sex:  male  Medical Record #: [id number]  Today's Date: 12/15/2022           12/15/22 1333   Interdisciplinary Plan of Care Collaboration   Collaboration Comments Pt is now on comfort care. OT services are no longer indicated. Will discharge pt from OT at this time.

## 2022-12-15 NOTE — CARE PLAN
The patient is Stable - Low risk of patient condition declining or worsening    Shift Goals  Clinical Goals: pt safety, comfort  Patient Goals: rest  Family Goals: SILVIO    Progress made toward(s) clinical / shift goals:    Problem: Knowledge Deficit - Standard  Goal: Patient and family/care givers will demonstrate understanding of plan of care, disease process/condition, diagnostic tests and medications  Outcome: Progressing  Note: Patient is AxO x3 and understands plan of care, all questions answered at this time. Pt not oriented to time      Problem: Pain - Standard  Goal: Alleviation of pain or a reduction in pain to the patient’s comfort goal  Outcome: Progressing  Note: Pt declines any pain at this time.      Problem: Fall Risk  Goal: Patient will remain free from falls  Outcome: Progressing  Note: Call light and personal belongings are within reach. Pt calls appropriately for nursing needs. Frequent rounding in place.  Bed is locked and in lowest position. Bed alarm is on and sounding.      Problem: Self Care  Goal: Patient will have the ability to perform ADLs independently or with assistance (bathe, groom, dress, toilet and feed)  Outcome: Progressing       Patient is not progressing towards the following goals: N/A

## 2022-12-15 NOTE — PROGRESS NOTES
4 Eyes Skin Assessment Completed by JANINE Andres and JANINE Smyth.    Head WDL  Ears WDL  Nose WDL  Mouth WDL  Neck WDL  Breast/Chest WDL  Shoulder Blades Redness  Spine WDL  (R) Arm/Elbow/Hand WDL  (L) Arm/Elbow/Hand WDL  Abdomen WDL  Groin WDL  Scrotum/Coccyx/Buttocks WDL  (R) Leg WDL  (L) Leg Redness  (R) Heel/Foot/Toe WDL  (L) Heel/Foot/Toe Redness      Pt noted to have scattered bruising. Left lower leg scratch marks noted.           Devices In Places Condom Cath      Interventions In Place Waffle Overlay    Possible Skin Injury No    Pictures Uploaded Into Epic N/A  Wound Consult Placed N/A  RN Wound Prevention Protocol Ordered No

## 2022-12-16 PROBLEM — R19.7 DIARRHEA OF PRESUMED INFECTIOUS ORIGIN: Status: RESOLVED | Noted: 2022-12-15 | Resolved: 2022-12-16

## 2022-12-16 PROCEDURE — 700102 HCHG RX REV CODE 250 W/ 637 OVERRIDE(OP): Performed by: STUDENT IN AN ORGANIZED HEALTH CARE EDUCATION/TRAINING PROGRAM

## 2022-12-16 PROCEDURE — A9270 NON-COVERED ITEM OR SERVICE: HCPCS

## 2022-12-16 PROCEDURE — 700102 HCHG RX REV CODE 250 W/ 637 OVERRIDE(OP)

## 2022-12-16 PROCEDURE — A9270 NON-COVERED ITEM OR SERVICE: HCPCS | Performed by: INTERNAL MEDICINE

## 2022-12-16 PROCEDURE — A9270 NON-COVERED ITEM OR SERVICE: HCPCS | Performed by: STUDENT IN AN ORGANIZED HEALTH CARE EDUCATION/TRAINING PROGRAM

## 2022-12-16 PROCEDURE — 700102 HCHG RX REV CODE 250 W/ 637 OVERRIDE(OP): Performed by: INTERNAL MEDICINE

## 2022-12-16 PROCEDURE — 770004 HCHG ROOM/CARE - ONCOLOGY PRIVATE *

## 2022-12-16 PROCEDURE — 99231 SBSQ HOSP IP/OBS SF/LOW 25: CPT | Performed by: INTERNAL MEDICINE

## 2022-12-16 RX ADMIN — LEVOTHYROXINE SODIUM 75 MCG: 0.07 TABLET ORAL at 04:45

## 2022-12-16 RX ADMIN — METOPROLOL TARTRATE 12.5 MG: 25 TABLET, FILM COATED ORAL at 04:45

## 2022-12-16 RX ADMIN — TAMSULOSIN HYDROCHLORIDE 0.4 MG: 0.4 CAPSULE ORAL at 17:25

## 2022-12-16 RX ADMIN — BENAZEPRIL HYDROCHLORIDE 10 MG: 20 TABLET ORAL at 21:21

## 2022-12-16 RX ADMIN — ACETAMINOPHEN 1000 MG: 500 TABLET ORAL at 04:45

## 2022-12-16 RX ADMIN — QUETIAPINE FUMARATE 200 MG: 200 TABLET, FILM COATED ORAL at 17:25

## 2022-12-16 ASSESSMENT — PAIN DESCRIPTION - PAIN TYPE
TYPE: ACUTE PAIN
TYPE: ACUTE PAIN

## 2022-12-16 NOTE — PROGRESS NOTES
Assumed patient care at 0700. Patient Aox3, disoriented to time. Patient denies need for pain interventions at this time. Patient belongings are nearby, bed in the lowest position and locked. Will continue to monitor patient during shift accordingly.

## 2022-12-16 NOTE — PROGRESS NOTES
Hospital Medicine Daily Progress Note    Date of Service  12/16/2022    Chief Complaint  Sina Lewis is a 82 y.o. male admitted 12/5/2022 with weakness and multiple ground-level falls, and inability to get himself up after falling. He has a history of atrial fibrillation, CKD stage III, hypertension, hypothyroidism, dyslipidemia, and tobacco use.    Hospital Course  On admission, patient was febrile and hypoxic.  UA was consistent with UTI.  Chest x-ray showed consolidation.  Patient was started on empiric antibiotic therapy presumed for UTI and aspiration.  There was a concern for prior stroke due to dysarthria and mild left facial droop.  Patient had dementia with neurological deficits. CT head from admission showed no evidence of acute infarct, hemorrhage or mass.    Patient had complained of chest pain.  Troponin was elevated.  EKG showed borderline ST depression in septal and lateral leads. Echocardiogram from 12/7/2022 showed mildly low LV systolic function with LVEF of about 50%. Noted to have a small mobile echodensity on the aortic valve, unclear if vegetation or plaque. Cardiology were consulted.  SUZY showed possible AV vegetation.  ID and cardiology were consulted.   Blood cultures were negative.  Patient was hemodynamically stable.  No further work-up was recommended by ID and cardiology.    Goals of care discussion was held on 12/14/2022.  Decision was made for hospice, and patient was transitioned to comfort care      Interval Problem Update  Stable overnight    I have discussed this patient's plan of care and discharge plan at IDT rounds today with Case Management, Nursing, Nursing leadership, and other members of the IDT team.    Consultants/Specialty  None    Code Status  Comfort Care/DNR    Disposition  Patient is medically cleared for discharge.   Anticipate discharge to to hospice.  I have placed the appropriate orders for post-discharge needs.    Review of Systems  Review of Systems    Unable to perform ROS: Dementia      Physical Exam  Temp:  [36.1 °C (97 °F)] 36.1 °C (97 °F)  Pulse:  [52] 52  Resp:  [18] 18  BP: ()/(39-60) 120/60  SpO2:  [90 %] 90 %    Physical Exam  Constitutional:       General: He is not in acute distress.     Appearance: He is ill-appearing.   HENT:      Head: Normocephalic.      Nose: Nose normal.      Mouth/Throat:      Mouth: Mucous membranes are moist.   Eyes:      Pupils: Pupils are equal, round, and reactive to light.   Cardiovascular:      Rate and Rhythm: Normal rate.   Pulmonary:      Effort: Pulmonary effort is normal.   Abdominal:      Palpations: Abdomen is soft.   Musculoskeletal:      Cervical back: Normal range of motion.   Skin:     General: Skin is warm.   Neurological:      Mental Status: He is alert. He is disoriented.   Psychiatric:      Comments: Calm, confused       Fluids    Intake/Output Summary (Last 24 hours) at 12/16/2022 1541  Last data filed at 12/15/2022 1951  Gross per 24 hour   Intake --   Output 400 ml   Net -400 ml       Laboratory                        Imaging  EC-ECHOCARDIOGRAM COMPLETE W/O CONT   Final Result      US-RUQ   Final Result      1.  Heterogenous hepatic echotexture, possibly hepatic steatosis versus early cirrhosis.   2.  Hepatomegaly.   3.  Cholelithiasis without sonographic evidence of acute pleural status      DX-HIP-UNILATERAL-WITH PELVIS-1 VIEW LEFT   Final Result      No acute osseous abnormality. Left hip arthroplasty is well seated.      DX-CHEST-PORTABLE (1 VIEW)   Final Result      Chronic elevation of the right hemidiaphragm with right basilar opacity, likely atelectasis.         CT-HEAD W/O   Final Result      1. No CT evidence of acute infarct, hemorrhage or mass.   2. Mild to moderate global parenchymal atrophy. Chronic small vessel ischemic changes.   3. Mild to moderate ethmoid sinus disease.           Assessment/Plan  * Dementia (HCC)  Assessment & Plan  On comfort care.    Frequent falls  Assessment  & Plan  History of multiple falls.  On comfort care    UTI (urinary tract infection)  Assessment & Plan  Completed antibiotic treatment.  On comfort care    Sepsis ([facility])  Assessment & Plan  Resolved.  On comfort care    NSTEMI (non-ST elevated myocardial infarction) ([facility])  Assessment & Plan  Elevated troponin with borderline ST depression in septal and lateral leads.  SUZY showed possible AV vegetation.  On comfort care    Community acquired pneumonia of right lower lobe of lung- (present on admission)  Assessment & Plan  Completed course of antibiotics.  On comfort care    Hypothyroid- (present on admission)  Assessment & Plan  Transitioned to comfort care    CKD (chronic kidney disease) stage 3, GFR 30-59 ml/min ([facility])- (present on admission)  Assessment & Plan  On comfort care       VTE prophylaxis: Comfort care

## 2022-12-16 NOTE — CARE PLAN
The patient is Stable - Low risk of patient condition declining or worsening    Shift Goals  Clinical Goals: pt safety, comfort  Patient Goals: rest  Family Goals: SILVIO    Progress made toward(s) clinical / shift goals:    Problem: Pain - Standard  Goal: Alleviation of pain or a reduction in pain to the patient’s comfort goal  Outcome: Progressing     Problem: Skin Integrity  Goal: Skin integrity is maintained or improved  Outcome: Progressing     Problem: Fall Risk  Goal: Patient will remain free from falls  Outcome: Progressing  Note: Bed is locked and in lowest position. Call light and personal belongings are within reach. Frequent rounding in place. Bed alarm is on and sounding.        Patient is not progressing towards the following goals:      Problem: Knowledge Deficit - Standard  Goal: Patient and family/care givers will demonstrate understanding of plan of care, disease process/condition, diagnostic tests and medications  Outcome: Not Progressing  Note: Patient is AxO x3, all questions answered at this time.  Pt updated on POC. Pt confused in conversation and requires frequent reorientation.

## 2022-12-17 PROCEDURE — 700102 HCHG RX REV CODE 250 W/ 637 OVERRIDE(OP): Performed by: STUDENT IN AN ORGANIZED HEALTH CARE EDUCATION/TRAINING PROGRAM

## 2022-12-17 PROCEDURE — 700102 HCHG RX REV CODE 250 W/ 637 OVERRIDE(OP): Performed by: INTERNAL MEDICINE

## 2022-12-17 PROCEDURE — 99231 SBSQ HOSP IP/OBS SF/LOW 25: CPT | Performed by: INTERNAL MEDICINE

## 2022-12-17 PROCEDURE — A9270 NON-COVERED ITEM OR SERVICE: HCPCS | Performed by: STUDENT IN AN ORGANIZED HEALTH CARE EDUCATION/TRAINING PROGRAM

## 2022-12-17 PROCEDURE — C9113 INJ PANTOPRAZOLE SODIUM, VIA: HCPCS | Performed by: INTERNAL MEDICINE

## 2022-12-17 PROCEDURE — A9270 NON-COVERED ITEM OR SERVICE: HCPCS

## 2022-12-17 PROCEDURE — 700111 HCHG RX REV CODE 636 W/ 250 OVERRIDE (IP): Performed by: INTERNAL MEDICINE

## 2022-12-17 PROCEDURE — 770004 HCHG ROOM/CARE - ONCOLOGY PRIVATE *

## 2022-12-17 PROCEDURE — 700102 HCHG RX REV CODE 250 W/ 637 OVERRIDE(OP)

## 2022-12-17 PROCEDURE — A9270 NON-COVERED ITEM OR SERVICE: HCPCS | Performed by: INTERNAL MEDICINE

## 2022-12-17 RX ORDER — ALUMINA, MAGNESIA, AND SIMETHICONE 2400; 2400; 240 MG/30ML; MG/30ML; MG/30ML
10 SUSPENSION ORAL 4 TIMES DAILY PRN
Status: DISCONTINUED | OUTPATIENT
Start: 2022-12-17 | End: 2022-12-29 | Stop reason: HOSPADM

## 2022-12-17 RX ORDER — PANTOPRAZOLE SODIUM 40 MG/10ML
20 INJECTION, POWDER, LYOPHILIZED, FOR SOLUTION INTRAVENOUS ONCE
Status: COMPLETED | OUTPATIENT
Start: 2022-12-17 | End: 2022-12-17

## 2022-12-17 RX ADMIN — QUETIAPINE FUMARATE 200 MG: 200 TABLET, FILM COATED ORAL at 18:00

## 2022-12-17 RX ADMIN — TAMSULOSIN HYDROCHLORIDE 0.4 MG: 0.4 CAPSULE ORAL at 19:40

## 2022-12-17 RX ADMIN — BENAZEPRIL HYDROCHLORIDE 10 MG: 20 TABLET ORAL at 22:34

## 2022-12-17 RX ADMIN — METOPROLOL TARTRATE 12.5 MG: 25 TABLET, FILM COATED ORAL at 04:48

## 2022-12-17 RX ADMIN — ALUMINUM HYDROXIDE, MAGNESIUM HYDROXIDE, AND DIMETHICONE 10 ML: 400; 400; 40 SUSPENSION ORAL at 19:39

## 2022-12-17 RX ADMIN — LEVOTHYROXINE SODIUM 75 MCG: 0.07 TABLET ORAL at 04:48

## 2022-12-17 RX ADMIN — PANTOPRAZOLE SODIUM 20 MG: 40 INJECTION, POWDER, LYOPHILIZED, FOR SOLUTION INTRAVENOUS at 19:46

## 2022-12-17 RX ADMIN — METOPROLOL TARTRATE 12.5 MG: 25 TABLET, FILM COATED ORAL at 19:40

## 2022-12-17 ASSESSMENT — FIBROSIS 4 INDEX: FIB4 SCORE: 5.2

## 2022-12-17 ASSESSMENT — PAIN DESCRIPTION - PAIN TYPE
TYPE: ACUTE PAIN
TYPE: ACUTE PAIN

## 2022-12-17 NOTE — CARE PLAN
The patient is Stable - Low risk of patient condition declining or worsening    Shift Goals  Clinical Goals: safety, rest  Patient Goals: rest  Family Goals: N/A    Progress made toward(s) clinical / shift goals:    Problem: Knowledge Deficit - Standard  Goal: Patient and family/care givers will demonstrate understanding of plan of care, disease process/condition, diagnostic tests and medications  Outcome: Progressing  Note: Patient is AxO x3, updated on plan of care.      Problem: Pain - Standard  Goal: Alleviation of pain or a reduction in pain to the patient’s comfort goal  Outcome: Progressing  Note: Pt declines any pain at this time.      Problem: Skin Integrity  Goal: Skin integrity is maintained or improved  Outcome: Progressing     Problem: Fall Risk  Goal: Patient will remain free from falls  Outcome: Progressing  Note: Call light and personal belongings are within reach. Pt calls appropriately for nursing needs. Frequent rounding in place.  Bed is locked and in lowest position. Bed alarm is on and sounding.        Patient is not progressing towards the following goals: N/A

## 2022-12-17 NOTE — CARE PLAN
The patient is Stable - Low risk of patient condition declining or worsening    Shift Goals  Clinical Goals: Patient will remain free of falls during shift  Patient Goals: Rest  Family Goals: n/a    Progress made toward(s) clinical / shift goals:  No falls note during shift.    Patient is not progressing towards the following goals:      Problem: Fall Risk  Goal: Patient will remain free from falls  12/16/2022 1829 by Germaine Holland R.N.  Outcome: Progressing  Note: No falls noted during shift.  12/16/2022 1828 by Germaine Holland R.N.  Outcome: Progressing  12/16/2022 1728 by Germaine Holland R.N.  Outcome: Progressing

## 2022-12-17 NOTE — PROGRESS NOTES
[facility] Medicine Daily Progress Note    Date of Service  12/17/2022    Chief Complaint  Sina Lewis is a 82 y.o. male admitted 12/5/2022 with weakness and multiple ground-level falls, and inability to get himself up after falling. He has a history of atrial fibrillation, CKD stage III, hypertension, hypothyroidism, dyslipidemia, and tobacco use.    Hospital Course  On admission, patient was febrile and hypoxic.  UA was consistent with UTI.  Chest x-ray showed consolidation.  Patient was started on empiric antibiotic therapy presumed for UTI and aspiration.  There was a concern for prior stroke due to dysarthria and mild left facial droop.  Patient had dementia with neurological deficits. CT head from admission showed no evidence of acute infarct, hemorrhage or mass.    Patient had complained of chest pain.  Troponin was elevated.  EKG showed borderline ST depression in septal and lateral leads. Echocardiogram from 12/7/2022 showed mildly low LV systolic function with LVEF of about 50%. Noted to have a small mobile echodensity on the aortic valve, unclear if vegetation or plaque. Cardiology were consulted.  SUZY showed possible AV vegetation.  ID and cardiology were consulted.   Blood cultures were negative.  Patient was hemodynamically stable.  No further work-up was recommended by ID and cardiology.    Goals of care discussion was held on 12/14/2022.  Patient's dementia dysphagia, after goals of care discussion on 12/14/2022, decision was made for hospice.  Patient was transitioned to comfort care    Interval Problem Update  Stable overnight    I have discussed this patient's plan of care and discharge plan at IDT rounds today with Case Management, Nursing, Nursing leadership, and other members of the IDT team.    Consultants/Specialty  None    Code Status  Comfort Care/DNR    Disposition  Patient is medically cleared for discharge.   Anticipate discharge to to hospice.  I have placed the appropriate orders  for post-discharge needs.    Review of Systems  Review of Systems   Unable to perform ROS: Dementia      Physical Exam  Temp:  [36.3 °C (97.4 °F)-37.3 °C (99.1 °F)] 36.6 °C (97.8 °F)  Pulse:  [54-80] 59  Resp:  [17-18] 18  BP: (104-166)/(63-98) 166/98  SpO2:  [91 %-96 %] 96 %    Physical Exam  Constitutional:       General: He is not in acute distress.     Appearance: He is ill-appearing.   HENT:      Head: Normocephalic.      Nose: Nose normal.      Mouth/Throat:      Mouth: Mucous membranes are moist.   Eyes:      Pupils: Pupils are equal, round, and reactive to light.   Cardiovascular:      Rate and Rhythm: Normal rate.   Pulmonary:      Effort: Pulmonary effort is normal.   Abdominal:      Palpations: Abdomen is soft.   Musculoskeletal:      Cervical back: Normal range of motion.   Skin:     General: Skin is warm.   Neurological:      Mental Status: He is alert. He is disoriented.   Psychiatric:      Comments: Calm, confused       Fluids    Intake/Output Summary (Last 24 hours) at 12/17/2022 1231  Last data filed at 12/17/2022 1059  Gross per 24 hour   Intake 120 ml   Output 800 ml   Net -680 ml         Laboratory                        Imaging  EC-ECHOCARDIOGRAM COMPLETE W/O CONT   Final Result      US-RUQ   Final Result      1.  Heterogenous hepatic echotexture, possibly hepatic steatosis versus early cirrhosis.   2.  Hepatomegaly.   3.  Cholelithiasis without sonographic evidence of acute pleural status      DX-HIP-UNILATERAL-WITH PELVIS-1 VIEW LEFT   Final Result      No acute osseous abnormality. Left hip arthroplasty is well seated.      DX-CHEST-PORTABLE (1 VIEW)   Final Result      Chronic elevation of the right hemidiaphragm with right basilar opacity, likely atelectasis.         CT-HEAD W/O   Final Result      1. No CT evidence of acute infarct, hemorrhage or mass.   2. Mild to moderate global parenchymal atrophy. Chronic small vessel ischemic changes.   3. Mild to moderate ethmoid sinus disease.            Assessment/Plan  * Dementia (formerly [facility])  Assessment & Plan  On comfort care.    Frequent falls  Assessment & Plan  History of multiple falls.  On comfort care    UTI (urinary tract infection)  Assessment & Plan  Completed antibiotic treatment.  On comfort care    Sepsis (formerly [facility])  Assessment & Plan  Resolved.  On comfort care    NSTEMI (non-ST elevated myocardial infarction) (formerly [facility])  Assessment & Plan  Elevated troponin with borderline ST depression in septal and lateral leads.  SUZY showed possible AV vegetation.  On comfort care    Community acquired pneumonia of right lower lobe of lung- (present on admission)  Assessment & Plan  Completed course of antibiotics.  On comfort care    Hypothyroid- (present on admission)  Assessment & Plan  Transitioned to comfort care    CKD (chronic kidney disease) stage 3, GFR 30-59 ml/min (formerly [facility])- (present on admission)  Assessment & Plan  On comfort care         VTE prophylaxis: Comfort care

## 2022-12-17 NOTE — CARE PLAN
The patient is Stable - Low risk of patient condition declining or worsening    Shift Goals  Clinical Goals: Patient will remain free of falls during shift  Patient Goals: Rest  Family Goals: n/a    Progress made toward(s) clinical / shift goals:  Remained free of falls    Problem: Fall Risk  Goal: Patient will remain free from falls  Outcome: Progressing     Patient is not progressing towards the following goals:

## 2022-12-18 PROCEDURE — A9270 NON-COVERED ITEM OR SERVICE: HCPCS | Performed by: STUDENT IN AN ORGANIZED HEALTH CARE EDUCATION/TRAINING PROGRAM

## 2022-12-18 PROCEDURE — 700102 HCHG RX REV CODE 250 W/ 637 OVERRIDE(OP)

## 2022-12-18 PROCEDURE — 700102 HCHG RX REV CODE 250 W/ 637 OVERRIDE(OP): Performed by: STUDENT IN AN ORGANIZED HEALTH CARE EDUCATION/TRAINING PROGRAM

## 2022-12-18 PROCEDURE — 700102 HCHG RX REV CODE 250 W/ 637 OVERRIDE(OP): Performed by: INTERNAL MEDICINE

## 2022-12-18 PROCEDURE — A9270 NON-COVERED ITEM OR SERVICE: HCPCS

## 2022-12-18 PROCEDURE — A9270 NON-COVERED ITEM OR SERVICE: HCPCS | Performed by: INTERNAL MEDICINE

## 2022-12-18 PROCEDURE — 770004 HCHG ROOM/CARE - ONCOLOGY PRIVATE *

## 2022-12-18 PROCEDURE — 99231 SBSQ HOSP IP/OBS SF/LOW 25: CPT | Performed by: INTERNAL MEDICINE

## 2022-12-18 RX ADMIN — TAMSULOSIN HYDROCHLORIDE 0.4 MG: 0.4 CAPSULE ORAL at 17:15

## 2022-12-18 RX ADMIN — ALUMINUM HYDROXIDE, MAGNESIUM HYDROXIDE, AND DIMETHICONE 10 ML: 400; 400; 40 SUSPENSION ORAL at 17:14

## 2022-12-18 RX ADMIN — QUETIAPINE FUMARATE 200 MG: 200 TABLET, FILM COATED ORAL at 17:16

## 2022-12-18 RX ADMIN — METOPROLOL TARTRATE 12.5 MG: 25 TABLET, FILM COATED ORAL at 17:16

## 2022-12-18 RX ADMIN — LEVOTHYROXINE SODIUM 75 MCG: 0.07 TABLET ORAL at 05:21

## 2022-12-18 RX ADMIN — Medication: at 05:21

## 2022-12-18 RX ADMIN — METOPROLOL TARTRATE 12.5 MG: 25 TABLET, FILM COATED ORAL at 05:20

## 2022-12-18 ASSESSMENT — PAIN DESCRIPTION - PAIN TYPE
TYPE: ACUTE PAIN
TYPE: ACUTE PAIN

## 2022-12-18 NOTE — PROGRESS NOTES
[facility] Medicine Daily Progress Note    Date of Service  12/18/2022    Chief Complaint  Sina Lewis is a 82 y.o. male admitted 12/5/2022 with weakness and multiple ground-level falls, and inability to get himself up after falling. He has a history of atrial fibrillation, CKD stage III, hypertension, hypothyroidism, dyslipidemia, and tobacco use.    Hospital Course  On admission, patient was febrile and hypoxic.  UA was consistent with UTI.  Chest x-ray showed consolidation.  Patient was started on empiric antibiotic therapy presumed for UTI and aspiration.  There was a concern for prior stroke due to dysarthria and mild left facial droop.  Patient had dementia with neurological deficits. CT head from admission showed no evidence of acute infarct, hemorrhage or mass.    Patient had complained of chest pain.  Troponin was elevated.  EKG showed borderline ST depression in septal and lateral leads. Echocardiogram from 12/7/2022 showed mildly low LV systolic function with LVEF of about 50%. Noted to have a small mobile echodensity on the aortic valve, unclear if vegetation or plaque. Cardiology were consulted.  SUZY showed possible AV vegetation.  ID and cardiology were consulted.   Blood cultures were negative.  Patient was hemodynamically stable.  No further work-up was recommended by ID and cardiology.    Goals of care discussion was held on 12/14/2022.  Decision was made for hospice, and patient was transitioned to comfort care    Interval Problem Update  Stable overnight    I have discussed this patient's plan of care and discharge plan at IDT rounds today with Case Management, Nursing, Nursing leadership, and other members of the IDT team.    Consultants/Specialty  None    Code Status  Comfort Care/DNR    Disposition  Patient is medically cleared for discharge.   Anticipate discharge to to hospice.  I have placed the appropriate orders for post-discharge needs.    Review of Systems  Review of Systems   Unable  to perform ROS: Dementia      Physical Exam  Temp:  [36.2 °C (97.1 °F)-36.6 °C (97.8 °F)] 36.2 °C (97.1 °F)  Pulse:  [59-69] 69  Resp:  [16-18] 16  BP: (116-166)/(53-98) 116/64  SpO2:  [93 %-96 %] 93 %    Physical Exam  Constitutional:       General: He is not in acute distress.     Appearance: He is ill-appearing.   HENT:      Head: Normocephalic.      Nose: Nose normal.      Mouth/Throat:      Mouth: Mucous membranes are moist.   Eyes:      Pupils: Pupils are equal, round, and reactive to light.   Cardiovascular:      Rate and Rhythm: Normal rate.   Pulmonary:      Effort: Pulmonary effort is normal.   Abdominal:      Palpations: Abdomen is soft.   Musculoskeletal:      Cervical back: Normal range of motion.   Skin:     General: Skin is warm.   Neurological:      Mental Status: He is alert. He is disoriented.   Psychiatric:      Comments: Calm, confused       Fluids    Intake/Output Summary (Last 24 hours) at 12/18/2022 0956  Last data filed at 12/17/2022 1059  Gross per 24 hour   Intake 120 ml   Output --   Net 120 ml         Laboratory                        Imaging  EC-ECHOCARDIOGRAM COMPLETE W/O CONT   Final Result      US-RUQ   Final Result      1.  Heterogenous hepatic echotexture, possibly hepatic steatosis versus early cirrhosis.   2.  Hepatomegaly.   3.  Cholelithiasis without sonographic evidence of acute pleural status      DX-HIP-UNILATERAL-WITH PELVIS-1 VIEW LEFT   Final Result      No acute osseous abnormality. Left hip arthroplasty is well seated.      DX-CHEST-PORTABLE (1 VIEW)   Final Result      Chronic elevation of the right hemidiaphragm with right basilar opacity, likely atelectasis.         CT-HEAD W/O   Final Result      1. No CT evidence of acute infarct, hemorrhage or mass.   2. Mild to moderate global parenchymal atrophy. Chronic small vessel ischemic changes.   3. Mild to moderate ethmoid sinus disease.           Assessment/Plan  * Dementia (HCC)  Assessment & Plan  On comfort  care.    Frequent falls  Assessment & Plan  History of multiple falls.  On comfort care    UTI (urinary tract infection)  Assessment & Plan  Completed antibiotic treatment.  On comfort care    Sepsis ([facility])  Assessment & Plan  Resolved.  On comfort care    NSTEMI (non-ST elevated myocardial infarction) ([facility])  Assessment & Plan  Elevated troponin with borderline ST depression in septal and lateral leads.  SUZY showed possible AV vegetation.  On comfort care    Community acquired pneumonia of right lower lobe of lung- (present on admission)  Assessment & Plan  Completed course of antibiotics.  On comfort care    Hypothyroid- (present on admission)  Assessment & Plan  Transitioned to comfort care    CKD (chronic kidney disease) stage 3, GFR 30-59 ml/min ([facility])- (present on admission)  Assessment & Plan  On comfort care         VTE prophylaxis: Comfort care

## 2022-12-18 NOTE — CARE PLAN
The patient is Stable - Low risk of patient condition declining or worsening    Shift Goals  Clinical Goals: keep patient comfortable, monitor, stool sample  Patient Goals: rest, go home  Family Goals: N/A    Progress made toward(s) clinical / shift goals:  Patient comfortable throughout shift, no BM.     Problem: Knowledge Deficit - Standard  Goal: Patient and family/care givers will demonstrate understanding of plan of care, disease process/condition, diagnostic tests and medications  Outcome: Progressing     Problem: Pain - Standard  Goal: Alleviation of pain or a reduction in pain to the patient’s comfort goal  Outcome: Progressing     Problem: Skin Integrity  Goal: Skin integrity is maintained or improved  Outcome: Progressing     Problem: Fall Risk  Goal: Patient will remain free from falls  Outcome: Progressing     Problem: Optimal Care of the Stroke Patient  Goal: Optimal emergency care for the stroke patient  Outcome: Progressing  Goal: Optimal acute care for the stroke patient  Outcome: Progressing     Problem: Knowledge Deficit - Stroke Education  Goal: Patient's knowledge of stroke and risk factors will improve  Outcome: Progressing     Problem: Neuro Status  Goal: Neuro status will remain stable or improve  Outcome: Progressing     Problem: Mobility - Stroke  Goal: Patient's capacity to carry out activities will improve  Outcome: Progressing     Problem: Self Care  Goal: Patient will have the ability to perform ADLs independently or with assistance (bathe, groom, dress, toilet and feed)  Outcome: Progressing       Patient is not progressing towards the following goals: n/a

## 2022-12-19 PROCEDURE — 700102 HCHG RX REV CODE 250 W/ 637 OVERRIDE(OP)

## 2022-12-19 PROCEDURE — A9270 NON-COVERED ITEM OR SERVICE: HCPCS | Performed by: STUDENT IN AN ORGANIZED HEALTH CARE EDUCATION/TRAINING PROGRAM

## 2022-12-19 PROCEDURE — A9270 NON-COVERED ITEM OR SERVICE: HCPCS

## 2022-12-19 PROCEDURE — 770004 HCHG ROOM/CARE - ONCOLOGY PRIVATE *

## 2022-12-19 PROCEDURE — 700102 HCHG RX REV CODE 250 W/ 637 OVERRIDE(OP): Performed by: STUDENT IN AN ORGANIZED HEALTH CARE EDUCATION/TRAINING PROGRAM

## 2022-12-19 PROCEDURE — 99231 SBSQ HOSP IP/OBS SF/LOW 25: CPT | Performed by: INTERNAL MEDICINE

## 2022-12-19 RX ADMIN — QUETIAPINE FUMARATE 200 MG: 200 TABLET, FILM COATED ORAL at 17:49

## 2022-12-19 RX ADMIN — METOPROLOL TARTRATE 12.5 MG: 25 TABLET, FILM COATED ORAL at 17:49

## 2022-12-19 RX ADMIN — LEVOTHYROXINE SODIUM 75 MCG: 0.07 TABLET ORAL at 05:07

## 2022-12-19 RX ADMIN — METOPROLOL TARTRATE 12.5 MG: 25 TABLET, FILM COATED ORAL at 05:07

## 2022-12-19 RX ADMIN — TAMSULOSIN HYDROCHLORIDE 0.4 MG: 0.4 CAPSULE ORAL at 17:49

## 2022-12-19 ASSESSMENT — PAIN DESCRIPTION - PAIN TYPE: TYPE: ACUTE PAIN

## 2022-12-19 NOTE — PROGRESS NOTES
[facility] Medicine Daily Progress Note    Date of Service  12/19/2022    Chief Complaint  Sina Lewis is a 82 y.o. male admitted 12/5/2022 with weakness and multiple ground-level falls, and inability to get himself up after falling. He has a history of atrial fibrillation, CKD stage III, hypertension, hypothyroidism, dyslipidemia, and tobacco use.    Hospital Course  On admission, patient was febrile and hypoxic.  UA was consistent with UTI.  Chest x-ray showed consolidation.  Patient was started on empiric antibiotic therapy presumed for UTI and aspiration.  There was a concern for prior stroke due to dysarthria and mild left facial droop.  Patient had dementia with neurological deficits. CT head from admission showed no evidence of acute infarct, hemorrhage or mass.    Patient had complained of chest pain.  Troponin was elevated.  EKG showed borderline ST depression in septal and lateral leads. Echocardiogram from 12/7/2022 showed mildly low LV systolic function with LVEF of about 50%. Noted to have a small mobile echodensity on the aortic valve, unclear if vegetation or plaque. Cardiology were consulted.  SUZY showed possible AV vegetation.  ID and cardiology were consulted.   Blood cultures were negative.  Patient was hemodynamically stable.  No further work-up was recommended by ID and cardiology.    Goals of care discussion was held on 12/14/2022.  Decision was made for hospice, and patient was transitioned to comfort care    Interval Problem Update  Stable overnight    I have discussed this patient's plan of care and discharge plan at IDT rounds today with Case Management, Nursing, Nursing leadership, and other members of the IDT team.    Consultants/Specialty  None    Code Status  Comfort Care/DNR    Disposition  Patient is medically cleared for discharge.   Anticipate discharge to to hospice.  I have placed the appropriate orders for post-discharge needs.    Review of Systems  Review of Systems   Unable  to perform ROS: Dementia      Physical Exam  Temp:  [36.2 °C (97.2 °F)-37.1 °C (98.7 °F)] 37.1 °C (98.7 °F)  Pulse:  [67-73] 67  Resp:  [16-18] 18  BP: (100-108)/(47-58) 107/57  SpO2:  [92 %-93 %] 92 %    Physical Exam  Constitutional:       General: He is not in acute distress.     Appearance: He is ill-appearing.   HENT:      Head: Normocephalic.      Nose: Nose normal.      Mouth/Throat:      Mouth: Mucous membranes are moist.   Eyes:      Pupils: Pupils are equal, round, and reactive to light.   Cardiovascular:      Rate and Rhythm: Normal rate.   Pulmonary:      Effort: Pulmonary effort is normal.   Abdominal:      Palpations: Abdomen is soft.   Musculoskeletal:      Cervical back: Normal range of motion.   Skin:     General: Skin is warm.   Neurological:      Mental Status: He is alert. He is disoriented.   Psychiatric:      Comments: Calm, confused       Fluids    Intake/Output Summary (Last 24 hours) at 12/19/2022 1026  Last data filed at 12/18/2022 1707  Gross per 24 hour   Intake 240 ml   Output 650 ml   Net -410 ml         Laboratory                        Imaging  EC-ECHOCARDIOGRAM COMPLETE W/O CONT   Final Result      US-RUQ   Final Result      1.  Heterogenous hepatic echotexture, possibly hepatic steatosis versus early cirrhosis.   2.  Hepatomegaly.   3.  Cholelithiasis without sonographic evidence of acute pleural status      DX-HIP-UNILATERAL-WITH PELVIS-1 VIEW LEFT   Final Result      No acute osseous abnormality. Left hip arthroplasty is well seated.      DX-CHEST-PORTABLE (1 VIEW)   Final Result      Chronic elevation of the right hemidiaphragm with right basilar opacity, likely atelectasis.         CT-HEAD W/O   Final Result      1. No CT evidence of acute infarct, hemorrhage or mass.   2. Mild to moderate global parenchymal atrophy. Chronic small vessel ischemic changes.   3. Mild to moderate ethmoid sinus disease.           Assessment/Plan  * Dementia (HCC)  Assessment & Plan  On comfort  care.    Frequent falls  Assessment & Plan  History of multiple falls.  On comfort care    UTI (urinary tract infection)  Assessment & Plan  Completed antibiotic treatment.  On comfort care    Sepsis ([facility])  Assessment & Plan  Resolved.  On comfort care    NSTEMI (non-ST elevated myocardial infarction) ([facility])  Assessment & Plan  Elevated troponin with borderline ST depression in septal and lateral leads.  SUZY showed possible AV vegetation.  On comfort care    Community acquired pneumonia of right lower lobe of lung- (present on admission)  Assessment & Plan  Completed course of antibiotics.  On comfort care    Hypothyroid- (present on admission)  Assessment & Plan  Transitioned to comfort care    CKD (chronic kidney disease) stage 3, GFR 30-59 ml/min ([facility])- (present on admission)  Assessment & Plan  On comfort care         VTE prophylaxis: Comfort care

## 2022-12-19 NOTE — CARE PLAN
The patient is Stable - Low risk of patient condition declining or worsening, comfort care    Shift Goals  Clinical Goals: Comfort; Safety  Patient Goals: Eat breakfast  Family Goals: Not present at this time    Progress made toward(s) clinical / shift goals:    Problem: Knowledge Deficit - Standard  Goal: Patient and family/care givers will demonstrate understanding of plan of care, disease process/condition, diagnostic tests and medications  Outcome: Progressing     Problem: Pain - Standard  Goal: Alleviation of pain or a reduction in pain to the patient’s comfort goal  Outcome: Progressing     Problem: Skin Integrity  Goal: Skin integrity is maintained or improved  Outcome: Progressing     Problem: Fall Risk  Goal: Patient will remain free from falls  Outcome: Progressing     Problem: Self Care  Goal: Patient will have the ability to perform ADLs independently or with assistance (bathe, groom, dress, toilet and feed)  Outcome: Progressing       Patient is not progressing towards the following goals:

## 2022-12-19 NOTE — CARE PLAN
The patient is Watcher - Medium risk of patient condition declining or worsening    Shift Goals  Clinical Goals: comfort, safety  Patient Goals: sleep, comfort  Family Goals: Not present at this time    Progress made toward(s) clinical / shift goals:      Problem: Knowledge Deficit - Comfort Care  Goal: Patient and family/care givers will demonstrate understanding of dying process and grieving  Outcome: Progressing     Problem: Discharge Planning - Comfort Care  Goal: Patient's continuum of care needs are met  Outcome: Progressing       Patient is not progressing towards the following goals:

## 2022-12-20 PROCEDURE — 700102 HCHG RX REV CODE 250 W/ 637 OVERRIDE(OP): Performed by: INTERNAL MEDICINE

## 2022-12-20 PROCEDURE — A9270 NON-COVERED ITEM OR SERVICE: HCPCS | Performed by: STUDENT IN AN ORGANIZED HEALTH CARE EDUCATION/TRAINING PROGRAM

## 2022-12-20 PROCEDURE — A9270 NON-COVERED ITEM OR SERVICE: HCPCS | Performed by: INTERNAL MEDICINE

## 2022-12-20 PROCEDURE — 770004 HCHG ROOM/CARE - ONCOLOGY PRIVATE *

## 2022-12-20 PROCEDURE — 700102 HCHG RX REV CODE 250 W/ 637 OVERRIDE(OP): Performed by: STUDENT IN AN ORGANIZED HEALTH CARE EDUCATION/TRAINING PROGRAM

## 2022-12-20 PROCEDURE — 700102 HCHG RX REV CODE 250 W/ 637 OVERRIDE(OP)

## 2022-12-20 PROCEDURE — 99231 SBSQ HOSP IP/OBS SF/LOW 25: CPT | Performed by: INTERNAL MEDICINE

## 2022-12-20 PROCEDURE — A9270 NON-COVERED ITEM OR SERVICE: HCPCS

## 2022-12-20 RX ADMIN — ALUMINUM HYDROXIDE, MAGNESIUM HYDROXIDE, AND DIMETHICONE 10 ML: 400; 400; 40 SUSPENSION ORAL at 18:19

## 2022-12-20 RX ADMIN — METOPROLOL TARTRATE 12.5 MG: 25 TABLET, FILM COATED ORAL at 18:19

## 2022-12-20 RX ADMIN — LEVOTHYROXINE SODIUM 75 MCG: 0.07 TABLET ORAL at 06:08

## 2022-12-20 RX ADMIN — OXYCODONE 5 MG: 5 TABLET ORAL at 03:45

## 2022-12-20 RX ADMIN — QUETIAPINE FUMARATE 200 MG: 200 TABLET, FILM COATED ORAL at 18:19

## 2022-12-20 RX ADMIN — TAMSULOSIN HYDROCHLORIDE 0.4 MG: 0.4 CAPSULE ORAL at 18:19

## 2022-12-20 RX ADMIN — ALUMINUM HYDROXIDE, MAGNESIUM HYDROXIDE, AND DIMETHICONE 10 ML: 400; 400; 40 SUSPENSION ORAL at 12:46

## 2022-12-20 ASSESSMENT — PAIN DESCRIPTION - PAIN TYPE
TYPE: ACUTE PAIN
TYPE: ACUTE PAIN

## 2022-12-20 NOTE — PROGRESS NOTES
[facility] Medicine Daily Progress Note    Date of Service  12/20/2022    Chief Complaint  Sina Lewis is a 82 y.o. male admitted 12/5/2022 with weakness and multiple ground-level falls, and inability to get himself up after falling. He has a history of atrial fibrillation, CKD stage III, hypertension, hypothyroidism, dyslipidemia, and tobacco use.    Hospital Course  On admission, patient was febrile and hypoxic.  UA was consistent with UTI.  Chest x-ray showed consolidation.  Patient was started on empiric antibiotic therapy presumed for UTI and aspiration.  There was a concern for prior stroke due to dysarthria and mild left facial droop.  Patient had dementia with neurological deficits. CT head from admission showed no evidence of acute infarct, hemorrhage or mass.    Patient had complained of chest pain.  Troponin was elevated.  EKG showed borderline ST depression in septal and lateral leads. Echocardiogram from 12/7/2022 showed mildly low LV systolic function with LVEF of about 50%. Noted to have a small mobile echodensity on the aortic valve, unclear if vegetation or plaque. Cardiology were consulted.  SUZY showed possible AV vegetation.  ID and cardiology were consulted.   Blood cultures were negative.  Patient was hemodynamically stable.  No further work-up was recommended by ID and cardiology.    Goals of care discussion was held on 12/14/2022.  Decision was made for hospice, and patient was transitioned to comfort care    Interval Problem Update  Stable overnight.  Case management/social work are assisting with placement for hospice    I have discussed this patient's plan of care and discharge plan at IDT rounds today with Case Management, Nursing, Nursing leadership, and other members of the IDT team.    Consultants/Specialty  None    Code Status  Comfort Care/DNR    Disposition  Patient is medically cleared for discharge.   Anticipate discharge to to hospice.  I have placed the appropriate orders for  post-discharge needs.    Review of Systems  Review of Systems   Unable to perform ROS: Dementia      Physical Exam  Temp:  [36.9 °C (98.4 °F)] 36.9 °C (98.4 °F)  Pulse:  [63] 63  Resp:  [16] 16  BP: (107)/(62) 107/62  SpO2:  [95 %] 95 %    Physical Exam  Constitutional:       General: He is not in acute distress.     Appearance: He is ill-appearing.   HENT:      Head: Normocephalic.      Nose: Nose normal.      Mouth/Throat:      Mouth: Mucous membranes are moist.   Eyes:      Pupils: Pupils are equal, round, and reactive to light.   Cardiovascular:      Rate and Rhythm: Normal rate.   Pulmonary:      Effort: Pulmonary effort is normal.   Abdominal:      Palpations: Abdomen is soft.   Musculoskeletal:      Cervical back: Normal range of motion.   Skin:     General: Skin is warm.   Neurological:      Mental Status: He is alert. He is disoriented.   Psychiatric:      Comments: Calm, confused       Fluids    Intake/Output Summary (Last 24 hours) at 12/20/2022 1235  Last data filed at 12/20/2022 1100  Gross per 24 hour   Intake 240 ml   Output 650 ml   Net -410 ml         Laboratory                        Imaging  EC-ECHOCARDIOGRAM COMPLETE W/O CONT   Final Result      US-RUQ   Final Result      1.  Heterogenous hepatic echotexture, possibly hepatic steatosis versus early cirrhosis.   2.  Hepatomegaly.   3.  Cholelithiasis without sonographic evidence of acute pleural status      DX-HIP-UNILATERAL-WITH PELVIS-1 VIEW LEFT   Final Result      No acute osseous abnormality. Left hip arthroplasty is well seated.      DX-CHEST-PORTABLE (1 VIEW)   Final Result      Chronic elevation of the right hemidiaphragm with right basilar opacity, likely atelectasis.         CT-HEAD W/O   Final Result      1. No CT evidence of acute infarct, hemorrhage or mass.   2. Mild to moderate global parenchymal atrophy. Chronic small vessel ischemic changes.   3. Mild to moderate ethmoid sinus disease.           Assessment/Plan  * Dementia  ([facility])  Assessment & Plan  On comfort care.    Frequent falls  Assessment & Plan  History of multiple falls.  On comfort care    UTI (urinary tract infection)  Assessment & Plan  Completed antibiotic treatment.  On comfort care    Sepsis ([facility])  Assessment & Plan  Resolved.  On comfort care    NSTEMI (non-ST elevated myocardial infarction) ([facility])  Assessment & Plan  Elevated troponin with borderline ST depression in septal and lateral leads.  SUZY showed possible AV vegetation.  On comfort care    Community acquired pneumonia of right lower lobe of lung- (present on admission)  Assessment & Plan  Completed course of antibiotics.  On comfort care    Hypothyroid- (present on admission)  Assessment & Plan  Transitioned to comfort care    CKD (chronic kidney disease) stage 3, GFR 30-59 ml/min ([facility])- (present on admission)  Assessment & Plan  On comfort care         VTE prophylaxis: Comfort care

## 2022-12-20 NOTE — CARE PLAN
The patient is Watcher - Medium risk of patient condition declining or worsening    Shift Goals  Clinical Goals: comfort, safety  Patient Goals: sleep  Family Goals: Not present at this time    Progress made toward(s) clinical / shift goals:      Problem: Knowledge Deficit - Standard  Goal: Patient and family/care givers will demonstrate understanding of plan of care, disease process/condition, diagnostic tests and medications  Outcome: Progressing     Problem: Pain - Standard  Goal: Alleviation of pain or a reduction in pain to the patient’s comfort goal  Outcome: Progressing     Problem: Skin Integrity  Goal: Skin integrity is maintained or improved  Outcome: Progressing     Problem: Fall Risk  Goal: Patient will remain free from falls  Outcome: Progressing       Patient is not progressing towards the following goals:

## 2022-12-20 NOTE — DISCHARGE PLANNING
Case Management Discharge Planning    Admission Date: 12/5/2022  GMLOS: 5  ALOS: 15    6-Clicks ADL Score: 20  6-Clicks Mobility Score: 18      Anticipated Discharge Dispo: Discharge Disposition: D/T to hospice home (50)    DME Needed: No    Action(s) Taken: Updated Provider/Nurse on Discharge Plan    Escalations Completed: Provider and Long Length of Stay Committee     Medically Clear: yes     Next Steps: CM spoke with [facility] RN yesterday regarding placement for pt. CM attempted to call relative, Thania Champagne this am, unable to leave VM as she does not have VM set up on her phone. BRADY discussed with hospitalist this am possibility of [facility] to look into assisting since patient is a difficult discharge. Patient is comfort care at this time.     Barriers to Discharge: Pending Placement    Is the patient up for discharge tomorrow: No

## 2022-12-21 PROCEDURE — A9270 NON-COVERED ITEM OR SERVICE: HCPCS | Performed by: INTERNAL MEDICINE

## 2022-12-21 PROCEDURE — 700102 HCHG RX REV CODE 250 W/ 637 OVERRIDE(OP): Performed by: INTERNAL MEDICINE

## 2022-12-21 PROCEDURE — 302196 LINEN, HYPOALLERGENIC: Performed by: INTERNAL MEDICINE

## 2022-12-21 PROCEDURE — 770004 HCHG ROOM/CARE - ONCOLOGY PRIVATE *

## 2022-12-21 PROCEDURE — 700102 HCHG RX REV CODE 250 W/ 637 OVERRIDE(OP)

## 2022-12-21 PROCEDURE — A9270 NON-COVERED ITEM OR SERVICE: HCPCS

## 2022-12-21 PROCEDURE — 700102 HCHG RX REV CODE 250 W/ 637 OVERRIDE(OP): Performed by: STUDENT IN AN ORGANIZED HEALTH CARE EDUCATION/TRAINING PROGRAM

## 2022-12-21 PROCEDURE — A9270 NON-COVERED ITEM OR SERVICE: HCPCS | Performed by: STUDENT IN AN ORGANIZED HEALTH CARE EDUCATION/TRAINING PROGRAM

## 2022-12-21 PROCEDURE — 99231 SBSQ HOSP IP/OBS SF/LOW 25: CPT | Performed by: INTERNAL MEDICINE

## 2022-12-21 RX ADMIN — LEVOTHYROXINE SODIUM 75 MCG: 0.07 TABLET ORAL at 06:01

## 2022-12-21 RX ADMIN — METOPROLOL TARTRATE 12.5 MG: 25 TABLET, FILM COATED ORAL at 18:47

## 2022-12-21 RX ADMIN — OXYCODONE HYDROCHLORIDE 10 MG: 10 TABLET ORAL at 18:47

## 2022-12-21 RX ADMIN — TAMSULOSIN HYDROCHLORIDE 0.4 MG: 0.4 CAPSULE ORAL at 18:48

## 2022-12-21 RX ADMIN — QUETIAPINE FUMARATE 200 MG: 200 TABLET, FILM COATED ORAL at 18:50

## 2022-12-21 RX ADMIN — OXYCODONE HYDROCHLORIDE 10 MG: 10 TABLET ORAL at 11:02

## 2022-12-21 RX ADMIN — ACETAMINOPHEN 1000 MG: 500 TABLET ORAL at 12:49

## 2022-12-21 RX ADMIN — ALUMINUM HYDROXIDE, MAGNESIUM HYDROXIDE, AND DIMETHICONE 10 ML: 400; 400; 40 SUSPENSION ORAL at 10:45

## 2022-12-21 ASSESSMENT — PAIN DESCRIPTION - PAIN TYPE
TYPE: ACUTE PAIN

## 2022-12-21 ASSESSMENT — PAIN SCALES - WONG BAKER: WONGBAKER_NUMERICALRESPONSE: DOESN'T HURT AT ALL

## 2022-12-21 NOTE — DISCHARGE PLANNING
Case Management Discharge Planning    Admission Date: 12/5/2022  GMLOS: 5  ALOS: 16    6-Clicks ADL Score: 20  6-Clicks Mobility Score: 18      Anticipated Discharge Dispo: Discharge Disposition: D/T to hospice home (50)    DME Needed: Yes    DME Ordered: No    Action(s) Taken: SW attempted to reach pt's niece, Thania, via phone listed in demographics. No answer and not able to leave voicemail. Team aware of barrier.    Escalations Completed: None    Medically Clear: Yes    Next Steps: Awaiting response from family member, Thania, to assist with group home placement and to gather information on pt finances.     Barriers to Discharge: Pending Placement and No Social Support    Is the patient up for discharge tomorrow: Yes    Is transport arranged for discharge disposition: No

## 2022-12-21 NOTE — CARE PLAN
Problem: Skin Integrity  Goal: Skin integrity is maintained or improved  Outcome: Progressing     Problem: Fall Risk  Goal: Patient will remain free from falls  Outcome: Progressing   The patient is Stable - Low risk of patient condition declining or worsening    Shift Goals  Clinical Goals: safety, comfort  Patient Goals: updates on plan of care, talk to doctor  Family Goals: Not present at this time    Progress made toward(s) clinical / shift goals:  patient turns self from side to side, skin integrity assessed while helping to ambulate patient; bed alarm on, chair alarm on, call light within reach, patient demonstrating ability to call as needed, not demonstrating impulsiveness in getting out of bed or chair     Patient is not progressing towards the following goals:

## 2022-12-21 NOTE — CARE PLAN
The patient is Watcher - Medium risk of patient condition declining or worsening    Shift Goals  Clinical Goals: safety comfort  Patient Goals: sleep  Family Goals: Not present at this time    Progress made toward(s) clinical / shift goals:      Problem: Fall Risk  Goal: Patient will remain free from falls  Outcome: Progressing     Problem: Knowledge Deficit - Comfort Care  Goal: Patient and family/care givers will demonstrate understanding of dying process and grieving  Outcome: Progressing     Patient is not progressing towards the following goals:

## 2022-12-21 NOTE — PROGRESS NOTES
[facility] Medicine Daily Progress Note    Date of Service  12/21/2022    Chief Complaint  Sina Lewis is a 82 y.o. male admitted 12/5/2022 with weakness and multiple ground-level falls, and inability to get himself up after falling. He has a history of atrial fibrillation, CKD stage III, hypertension, hypothyroidism, dyslipidemia, and tobacco use.    Hospital Course  On admission, patient was febrile and hypoxic.  UA was consistent with UTI.  Chest x-ray showed consolidation.  Patient was started on empiric antibiotic therapy presumed for UTI and aspiration.  There was a concern for prior stroke due to dysarthria and mild left facial droop.  Patient had dementia with neurological deficits. CT head from admission showed no evidence of acute infarct, hemorrhage or mass.    Patient had complained of chest pain.  Troponin was elevated.  EKG showed borderline ST depression in septal and lateral leads. Echocardiogram from 12/7/2022 showed mildly low LV systolic function with LVEF of about 50%. Noted to have a small mobile echodensity on the aortic valve, unclear if vegetation or plaque. Cardiology were consulted.  SUZY showed possible AV vegetation.  ID and cardiology were consulted.   Blood cultures were negative.  Patient was hemodynamically stable.  No further work-up was recommended by ID and cardiology.    Goals of care discussion was held on 12/14/2022.  Decision was made for hospice, and patient was transitioned to comfort care    Interval Problem Update  Stable overnight.  Case management/social work are assisting with placement for hospice    I have discussed this patient's plan of care and discharge plan at IDT rounds today with Case Management, Nursing, Nursing leadership, and other members of the IDT team.    Consultants/Specialty  None    Code Status  Comfort Care/DNR    Disposition  Patient is medically cleared for discharge.   Anticipate discharge to to hospice.  I have placed the appropriate orders for  post-discharge needs.    Review of Systems  Review of Systems   Unable to perform ROS: Dementia      Physical Exam  Temp:  [36.2 °C (97.1 °F)-36.4 °C (97.6 °F)] 36.4 °C (97.6 °F)  Pulse:  [58-92] 65  Resp:  [16-17] 16  BP: ()/(56-70) 120/59  SpO2:  [93 %-95 %] 93 %    Physical Exam  Constitutional:       General: He is not in acute distress.     Appearance: He is ill-appearing.   HENT:      Head: Normocephalic.      Nose: Nose normal.      Mouth/Throat:      Mouth: Mucous membranes are moist.   Eyes:      Pupils: Pupils are equal, round, and reactive to light.   Cardiovascular:      Rate and Rhythm: Normal rate.   Pulmonary:      Effort: Pulmonary effort is normal.   Abdominal:      Palpations: Abdomen is soft.   Musculoskeletal:      Cervical back: Normal range of motion.   Skin:     General: Skin is warm.   Neurological:      Mental Status: He is alert. He is disoriented.   Psychiatric:      Comments: Calm, confused       Fluids    Intake/Output Summary (Last 24 hours) at 12/21/2022 1115  Last data filed at 12/21/2022 0832  Gross per 24 hour   Intake --   Output 900 ml   Net -900 ml         Laboratory                        Imaging  EC-ECHOCARDIOGRAM COMPLETE W/O CONT   Final Result      US-RUQ   Final Result      1.  Heterogenous hepatic echotexture, possibly hepatic steatosis versus early cirrhosis.   2.  Hepatomegaly.   3.  Cholelithiasis without sonographic evidence of acute pleural status      DX-HIP-UNILATERAL-WITH PELVIS-1 VIEW LEFT   Final Result      No acute osseous abnormality. Left hip arthroplasty is well seated.      DX-CHEST-PORTABLE (1 VIEW)   Final Result      Chronic elevation of the right hemidiaphragm with right basilar opacity, likely atelectasis.         CT-HEAD W/O   Final Result      1. No CT evidence of acute infarct, hemorrhage or mass.   2. Mild to moderate global parenchymal atrophy. Chronic small vessel ischemic changes.   3. Mild to moderate ethmoid sinus disease.            Assessment/Plan  * Dementia ([facility])  Assessment & Plan  On comfort care.    Frequent falls  Assessment & Plan  History of multiple falls.  On comfort care    UTI (urinary tract infection)  Assessment & Plan  Completed antibiotic treatment.  On comfort care    Sepsis ([facility])  Assessment & Plan  Resolved.  On comfort care    NSTEMI (non-ST elevated myocardial infarction) ([facility])  Assessment & Plan  Elevated troponin with borderline ST depression in septal and lateral leads.  SUZY showed possible AV vegetation.  On comfort care    Community acquired pneumonia of right lower lobe of lung- (present on admission)  Assessment & Plan  Completed course of antibiotics.  On comfort care    Hypothyroid- (present on admission)  Assessment & Plan  Transitioned to comfort care    CKD (chronic kidney disease) stage 3, GFR 30-59 ml/min ([facility])- (present on admission)  Assessment & Plan  On comfort care         VTE prophylaxis: Comfort care

## 2022-12-22 PROCEDURE — A9270 NON-COVERED ITEM OR SERVICE: HCPCS | Performed by: INTERNAL MEDICINE

## 2022-12-22 PROCEDURE — 700102 HCHG RX REV CODE 250 W/ 637 OVERRIDE(OP)

## 2022-12-22 PROCEDURE — 700102 HCHG RX REV CODE 250 W/ 637 OVERRIDE(OP): Performed by: STUDENT IN AN ORGANIZED HEALTH CARE EDUCATION/TRAINING PROGRAM

## 2022-12-22 PROCEDURE — A9270 NON-COVERED ITEM OR SERVICE: HCPCS | Performed by: STUDENT IN AN ORGANIZED HEALTH CARE EDUCATION/TRAINING PROGRAM

## 2022-12-22 PROCEDURE — 700102 HCHG RX REV CODE 250 W/ 637 OVERRIDE(OP): Performed by: INTERNAL MEDICINE

## 2022-12-22 PROCEDURE — A9270 NON-COVERED ITEM OR SERVICE: HCPCS

## 2022-12-22 PROCEDURE — 99231 SBSQ HOSP IP/OBS SF/LOW 25: CPT | Performed by: INTERNAL MEDICINE

## 2022-12-22 PROCEDURE — 770004 HCHG ROOM/CARE - ONCOLOGY PRIVATE *

## 2022-12-22 RX ADMIN — ALUMINUM HYDROXIDE, MAGNESIUM HYDROXIDE, AND DIMETHICONE 10 ML: 400; 400; 40 SUSPENSION ORAL at 11:50

## 2022-12-22 RX ADMIN — METOPROLOL TARTRATE 12.5 MG: 25 TABLET, FILM COATED ORAL at 18:34

## 2022-12-22 RX ADMIN — BENAZEPRIL HYDROCHLORIDE 10 MG: 20 TABLET ORAL at 21:08

## 2022-12-22 RX ADMIN — QUETIAPINE FUMARATE 200 MG: 200 TABLET, FILM COATED ORAL at 21:07

## 2022-12-22 RX ADMIN — OXYCODONE HYDROCHLORIDE 10 MG: 10 TABLET ORAL at 11:49

## 2022-12-22 RX ADMIN — OXYCODONE HYDROCHLORIDE 10 MG: 10 TABLET ORAL at 18:33

## 2022-12-22 RX ADMIN — OXYCODONE HYDROCHLORIDE 10 MG: 10 TABLET ORAL at 04:25

## 2022-12-22 RX ADMIN — TAMSULOSIN HYDROCHLORIDE 0.4 MG: 0.4 CAPSULE ORAL at 18:33

## 2022-12-22 RX ADMIN — OXYCODONE HYDROCHLORIDE 10 MG: 10 TABLET ORAL at 09:14

## 2022-12-22 RX ADMIN — OXYCODONE HYDROCHLORIDE 10 MG: 10 TABLET ORAL at 16:30

## 2022-12-22 ASSESSMENT — PAIN DESCRIPTION - PAIN TYPE
TYPE: ACUTE PAIN
TYPE: ACUTE PAIN

## 2022-12-22 NOTE — CARE PLAN
Problem: Knowledge Deficit - Standard  Goal: Patient and family/care givers will demonstrate understanding of plan of care, disease process/condition, diagnostic tests and medications  Outcome: Progressing     Problem: Pain - Standard  Goal: Alleviation of pain or a reduction in pain to the patient’s comfort goal  Outcome: Progressing     Problem: Skin Integrity  Goal: Skin integrity is maintained or improved  Outcome: Progressing     Problem: Fall Risk  Goal: Patient will remain free from falls  Outcome: Progressing   The patient is Watcher - Medium risk of patient condition declining or worseningstable    Shift Goals  Clinical Goals: comfort  Patient Goals: sleep  Family Goals: Not present  Progress made toward(s) clinical / shift goals:  pain control    Patient is not progressing towards the following goals:none

## 2022-12-22 NOTE — CARE PLAN
The patient is Stable - Low risk of patient condition declining or worsening    Shift Goals  Clinical Goals: comfort  Patient Goals: sleep  Family Goals: Not present    Progress made toward(s) clinical / shift goals:      Problem: Knowledge Deficit - Standard  Goal: Patient and family/care givers will demonstrate understanding of plan of care, disease process/condition, diagnostic tests and medications  Outcome: Progressing     Problem: Pain - Standard  Goal: Alleviation of pain or a reduction in pain to the patient’s comfort goal  Outcome: Progressing     Problem: Skin Integrity  Goal: Skin integrity is maintained or improved  Outcome: Progressing     Problem: Fall Risk  Goal: Patient will remain free from falls  Outcome: Progressing       Patient is not progressing towards the following goals:

## 2022-12-23 PROCEDURE — A9270 NON-COVERED ITEM OR SERVICE: HCPCS

## 2022-12-23 PROCEDURE — 700102 HCHG RX REV CODE 250 W/ 637 OVERRIDE(OP)

## 2022-12-23 PROCEDURE — A9270 NON-COVERED ITEM OR SERVICE: HCPCS | Performed by: INTERNAL MEDICINE

## 2022-12-23 PROCEDURE — 700102 HCHG RX REV CODE 250 W/ 637 OVERRIDE(OP): Performed by: INTERNAL MEDICINE

## 2022-12-23 PROCEDURE — 99231 SBSQ HOSP IP/OBS SF/LOW 25: CPT | Performed by: INTERNAL MEDICINE

## 2022-12-23 PROCEDURE — 36415 COLL VENOUS BLD VENIPUNCTURE: CPT

## 2022-12-23 PROCEDURE — 770004 HCHG ROOM/CARE - ONCOLOGY PRIVATE *

## 2022-12-23 PROCEDURE — 86480 TB TEST CELL IMMUN MEASURE: CPT

## 2022-12-23 PROCEDURE — A9270 NON-COVERED ITEM OR SERVICE: HCPCS | Performed by: STUDENT IN AN ORGANIZED HEALTH CARE EDUCATION/TRAINING PROGRAM

## 2022-12-23 PROCEDURE — 700102 HCHG RX REV CODE 250 W/ 637 OVERRIDE(OP): Performed by: STUDENT IN AN ORGANIZED HEALTH CARE EDUCATION/TRAINING PROGRAM

## 2022-12-23 RX ADMIN — LEVOTHYROXINE SODIUM 75 MCG: 0.07 TABLET ORAL at 05:29

## 2022-12-23 RX ADMIN — OXYCODONE 5 MG: 5 TABLET ORAL at 20:16

## 2022-12-23 RX ADMIN — ACETAMINOPHEN 1000 MG: 500 TABLET ORAL at 15:51

## 2022-12-23 RX ADMIN — BENAZEPRIL HYDROCHLORIDE 10 MG: 20 TABLET ORAL at 20:16

## 2022-12-23 RX ADMIN — METOPROLOL TARTRATE 12.5 MG: 25 TABLET, FILM COATED ORAL at 05:29

## 2022-12-23 RX ADMIN — ALUMINUM HYDROXIDE, MAGNESIUM HYDROXIDE, AND DIMETHICONE 10 ML: 400; 400; 40 SUSPENSION ORAL at 18:09

## 2022-12-23 RX ADMIN — ACETAMINOPHEN 1000 MG: 500 TABLET ORAL at 01:45

## 2022-12-23 RX ADMIN — TAMSULOSIN HYDROCHLORIDE 0.4 MG: 0.4 CAPSULE ORAL at 17:48

## 2022-12-23 RX ADMIN — QUETIAPINE FUMARATE 200 MG: 200 TABLET, FILM COATED ORAL at 17:49

## 2022-12-23 ASSESSMENT — ENCOUNTER SYMPTOMS
GASTROINTESTINAL NEGATIVE: 1
CARDIOVASCULAR NEGATIVE: 1
NEUROLOGICAL NEGATIVE: 1
EYES NEGATIVE: 1
RESPIRATORY NEGATIVE: 1
MUSCULOSKELETAL NEGATIVE: 1
PSYCHIATRIC NEGATIVE: 1

## 2022-12-23 ASSESSMENT — PAIN DESCRIPTION - PAIN TYPE: TYPE: ACUTE PAIN

## 2022-12-23 NOTE — PROGRESS NOTES
Hospital Medicine Daily Progress Note    Date of Service  12/23/2022    Chief Complaint  Sina Lewis is a 82 y.o. male admitted 12/5/2022 with weakness and multiple ground-level falls, and inability to get himself up after falling. He has a history of atrial fibrillation, CKD stage III, hypertension, hypothyroidism, dyslipidemia, and tobacco use.    Hospital Course  On admission, patient was febrile and hypoxic.  UA was consistent with UTI.  Chest x-ray showed consolidation.  Patient was started on empiric antibiotic therapy presumed for UTI and aspiration.  There was a concern for prior stroke due to dysarthria and mild left facial droop.  Patient had dementia with neurological deficits. CT head from admission showed no evidence of acute infarct, hemorrhage or mass.    Patient had complained of chest pain.  Troponin was elevated.  EKG showed borderline ST depression in septal and lateral leads. Echocardiogram from 12/7/2022 showed mildly low LV systolic function with LVEF of about 50%. Noted to have a small mobile echodensity on the aortic valve, unclear if vegetation or plaque. Cardiology were consulted.  SUZY showed possible AV vegetation.  ID and cardiology were consulted.   Blood cultures were negative.  Patient was hemodynamically stable.  No further work-up was recommended by ID and cardiology.    Goals of care discussion was held on 12/14/2022.  Decision was made for hospice, and patient was transitioned to comfort care    Interval Problem Update  Stable overnight.  Case management/social work are assisting with placement for hospice.  I discussed outpatient hospice choices with patient.  He was alert and oriented x3, and was agreeable to discharging home with [facility] hospice.    I have discussed this patient's plan of care and discharge plan at IDT rounds today with Case Management, Nursing, Nursing leadership, and other members of the IDT team.    Consultants/Specialty  None    Code Status  Comfort  Care/DNR    Disposition  Patient is medically cleared for discharge.   Anticipate discharge to to hospice.  I have placed the appropriate orders for post-discharge needs.    Review of Systems  Review of Systems   Unable to perform ROS: Dementia   Constitutional:  Positive for malaise/fatigue.   HENT: Negative.     Eyes: Negative.    Respiratory: Negative.     Cardiovascular: Negative.    Gastrointestinal: Negative.    Genitourinary: Negative.    Musculoskeletal: Negative.    Skin: Negative.    Neurological: Negative.    Endo/Heme/Allergies: Negative.    Psychiatric/Behavioral: Negative.        Physical Exam  Temp:  [36 °C (96.8 °F)] 36 °C (96.8 °F)  Pulse:  [56] 56  Resp:  [18] 18  BP: (76)/(43) 76/43  SpO2:  [92 %] 92 %    Physical Exam  Constitutional:       General: He is not in acute distress.     Appearance: He is ill-appearing.   HENT:      Head: Normocephalic.      Nose: Nose normal.      Mouth/Throat:      Mouth: Mucous membranes are moist.   Eyes:      Pupils: Pupils are equal, round, and reactive to light.   Cardiovascular:      Rate and Rhythm: Normal rate.   Pulmonary:      Effort: Pulmonary effort is normal.   Abdominal:      Palpations: Abdomen is soft.   Musculoskeletal:      Cervical back: Normal range of motion.   Skin:     General: Skin is warm.   Neurological:      Mental Status: He is alert. Mental status is at baseline.   Psychiatric:      Comments: Calm, confused       Fluids  No intake or output data in the 24 hours ending 12/23/22 1440      Laboratory                        Imaging  EC-ECHOCARDIOGRAM COMPLETE W/O CONT   Final Result      US-RUQ   Final Result      1.  Heterogenous hepatic echotexture, possibly hepatic steatosis versus early cirrhosis.   2.  Hepatomegaly.   3.  Cholelithiasis without sonographic evidence of acute pleural status      DX-HIP-UNILATERAL-WITH PELVIS-1 VIEW LEFT   Final Result      No acute osseous abnormality. Left hip arthroplasty is well seated.       DX-CHEST-PORTABLE (1 VIEW)   Final Result      Chronic elevation of the right hemidiaphragm with right basilar opacity, likely atelectasis.         CT-HEAD W/O   Final Result      1. No CT evidence of acute infarct, hemorrhage or mass.   2. Mild to moderate global parenchymal atrophy. Chronic small vessel ischemic changes.   3. Mild to moderate ethmoid sinus disease.           Assessment/Plan  * Dementia ([facility])  Assessment & Plan  On comfort care.    Frequent falls  Assessment & Plan  History of multiple falls.  On comfort care    UTI (urinary tract infection)  Assessment & Plan  Completed antibiotic treatment.  On comfort care    Sepsis ([facility])  Assessment & Plan  Resolved.  On comfort care    NSTEMI (non-ST elevated myocardial infarction) ([facility])  Assessment & Plan  Elevated troponin with borderline ST depression in septal and lateral leads.  SUZY showed possible AV vegetation.  On comfort care    Community acquired pneumonia of right lower lobe of lung- (present on admission)  Assessment & Plan  Completed course of antibiotics.  On comfort care    Hypothyroid- (present on admission)  Assessment & Plan  Transitioned to comfort care    CKD (chronic kidney disease) stage 3, GFR 30-59 ml/min ([facility])- (present on admission)  Assessment & Plan  On comfort care         VTE prophylaxis: Comfort care

## 2022-12-23 NOTE — PROGRESS NOTES
[facility] Medicine Daily Progress Note    Date of Service  12/22/2022    Chief Complaint  Sina Lewis is a 82 y.o. male admitted 12/5/2022 with weakness and multiple ground-level falls, and inability to get himself up after falling. He has a history of atrial fibrillation, CKD stage III, hypertension, hypothyroidism, dyslipidemia, and tobacco use.    Hospital Course  On admission, patient was febrile and hypoxic.  UA was consistent with UTI.  Chest x-ray showed consolidation.  Patient was started on empiric antibiotic therapy presumed for UTI and aspiration.  There was a concern for prior stroke due to dysarthria and mild left facial droop.  Patient had dementia with neurological deficits. CT head from admission showed no evidence of acute infarct, hemorrhage or mass.    Patient had complained of chest pain.  Troponin was elevated.  EKG showed borderline ST depression in septal and lateral leads. Echocardiogram from 12/7/2022 showed mildly low LV systolic function with LVEF of about 50%. Noted to have a small mobile echodensity on the aortic valve, unclear if vegetation or plaque. Cardiology were consulted.  SUZY showed possible AV vegetation.  ID and cardiology were consulted.   Blood cultures were negative.  Patient was hemodynamically stable.  No further work-up was recommended by ID and cardiology.    Goals of care discussion was held on 12/14/2022.  Decision was made for hospice, and patient was transitioned to comfort care    Interval Problem Update  Stable overnight.  Case management/social work are assisting with placement for hospice    I have discussed this patient's plan of care and discharge plan at IDT rounds today with Case Management, Nursing, Nursing leadership, and other members of the IDT team.    Consultants/Specialty  None    Code Status  Comfort Care/DNR    Disposition  Patient is medically cleared for discharge.   Anticipate discharge to to hospice.  I have placed the appropriate orders for  post-discharge needs.    Review of Systems  Review of Systems   Unable to perform ROS: Dementia      Physical Exam  Temp:  [36.7 °C (98.1 °F)] 36.7 °C (98.1 °F)  Pulse:  [70] 70  Resp:  [17] 17  BP: (110)/(47) 110/47  SpO2:  [94 %] 94 %    Physical Exam  Constitutional:       General: He is not in acute distress.     Appearance: He is ill-appearing.   HENT:      Head: Normocephalic.      Nose: Nose normal.      Mouth/Throat:      Mouth: Mucous membranes are moist.   Eyes:      Pupils: Pupils are equal, round, and reactive to light.   Cardiovascular:      Rate and Rhythm: Normal rate.   Pulmonary:      Effort: Pulmonary effort is normal.   Abdominal:      Palpations: Abdomen is soft.   Musculoskeletal:      Cervical back: Normal range of motion.   Skin:     General: Skin is warm.   Neurological:      Mental Status: He is alert. He is disoriented.   Psychiatric:      Comments: Calm, confused       Fluids  No intake or output data in the 24 hours ending 12/22/22 1641      Laboratory                        Imaging  EC-ECHOCARDIOGRAM COMPLETE W/O CONT   Final Result      US-RUQ   Final Result      1.  Heterogenous hepatic echotexture, possibly hepatic steatosis versus early cirrhosis.   2.  Hepatomegaly.   3.  Cholelithiasis without sonographic evidence of acute pleural status      DX-HIP-UNILATERAL-WITH PELVIS-1 VIEW LEFT   Final Result      No acute osseous abnormality. Left hip arthroplasty is well seated.      DX-CHEST-PORTABLE (1 VIEW)   Final Result      Chronic elevation of the right hemidiaphragm with right basilar opacity, likely atelectasis.         CT-HEAD W/O   Final Result      1. No CT evidence of acute infarct, hemorrhage or mass.   2. Mild to moderate global parenchymal atrophy. Chronic small vessel ischemic changes.   3. Mild to moderate ethmoid sinus disease.           Assessment/Plan  * Dementia ([facility])  Assessment & Plan  On comfort care.    Frequent falls  Assessment & Plan  History of multiple falls.   On comfort care    UTI (urinary tract infection)  Assessment & Plan  Completed antibiotic treatment.  On comfort care    Sepsis ([facility])  Assessment & Plan  Resolved.  On comfort care    NSTEMI (non-ST elevated myocardial infarction) ([facility])  Assessment & Plan  Elevated troponin with borderline ST depression in septal and lateral leads.  SUZY showed possible AV vegetation.  On comfort care    Community acquired pneumonia of right lower lobe of lung- (present on admission)  Assessment & Plan  Completed course of antibiotics.  On comfort care    Hypothyroid- (present on admission)  Assessment & Plan  Transitioned to comfort care    CKD (chronic kidney disease) stage 3, GFR 30-59 ml/min ([facility])- (present on admission)  Assessment & Plan  On comfort care         VTE prophylaxis: Comfort care

## 2022-12-23 NOTE — CARE PLAN
Problem: Knowledge Deficit - Standard  Goal: Patient and family/care givers will demonstrate understanding of plan of care, disease process/condition, diagnostic tests and medications  Outcome: Progressing     Problem: Pain - Standard  Goal: Alleviation of pain or a reduction in pain to the patient’s comfort goal  Outcome: Progressing     Problem: Skin Integrity  Goal: Skin integrity is maintained or improved  Outcome: Progressing     Problem: Fall Risk  Goal: Patient will remain free from falls  Outcome: Progressing   The patient is Watcher - Medium risk of patient condition declining or worsening    Shift Goals  Clinical Goals: comfort  Patient Goals: sleep  Family Goals: Not present    Progress made toward(s) clinical / shift goals:  comfort care    Patient is not progressing towards the following goals:none
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
## 2022-12-23 NOTE — CARE PLAN
The patient is Stable - Low risk of patient condition declining or worsening    Shift Goals  Clinical Goals: Rest  Patient Goals: Rest  Family Goals: Not present    Patient updated on plan of care. Patient educated on pain scale and how to describe pain. Patient denies any pain right now. Patient medicated per MAR for scheduled medications.    Progress made toward(s) clinical / shift goals:      Problem: Knowledge Deficit - Standard  Goal: Patient and family/care givers will demonstrate understanding of plan of care, disease process/condition, diagnostic tests and medications  Outcome: Progressing     Problem: Pain - Standard  Goal: Alleviation of pain or a reduction in pain to the patient’s comfort goal  Outcome: Progressing       Patient is not progressing towards the following goals:

## 2022-12-24 PROCEDURE — 700102 HCHG RX REV CODE 250 W/ 637 OVERRIDE(OP): Performed by: STUDENT IN AN ORGANIZED HEALTH CARE EDUCATION/TRAINING PROGRAM

## 2022-12-24 PROCEDURE — A9270 NON-COVERED ITEM OR SERVICE: HCPCS | Performed by: INTERNAL MEDICINE

## 2022-12-24 PROCEDURE — A9270 NON-COVERED ITEM OR SERVICE: HCPCS | Performed by: STUDENT IN AN ORGANIZED HEALTH CARE EDUCATION/TRAINING PROGRAM

## 2022-12-24 PROCEDURE — 700102 HCHG RX REV CODE 250 W/ 637 OVERRIDE(OP): Performed by: INTERNAL MEDICINE

## 2022-12-24 PROCEDURE — 770004 HCHG ROOM/CARE - ONCOLOGY PRIVATE *

## 2022-12-24 PROCEDURE — 700102 HCHG RX REV CODE 250 W/ 637 OVERRIDE(OP)

## 2022-12-24 PROCEDURE — 99231 SBSQ HOSP IP/OBS SF/LOW 25: CPT | Performed by: INTERNAL MEDICINE

## 2022-12-24 PROCEDURE — A9270 NON-COVERED ITEM OR SERVICE: HCPCS

## 2022-12-24 RX ADMIN — LEVOTHYROXINE SODIUM 75 MCG: 0.07 TABLET ORAL at 04:14

## 2022-12-24 RX ADMIN — ALUMINUM HYDROXIDE, MAGNESIUM HYDROXIDE, AND DIMETHICONE 10 ML: 400; 400; 40 SUSPENSION ORAL at 11:44

## 2022-12-24 RX ADMIN — TAMSULOSIN HYDROCHLORIDE 0.4 MG: 0.4 CAPSULE ORAL at 17:25

## 2022-12-24 RX ADMIN — METOPROLOL TARTRATE 12.5 MG: 25 TABLET, FILM COATED ORAL at 17:25

## 2022-12-24 RX ADMIN — QUETIAPINE FUMARATE 200 MG: 200 TABLET, FILM COATED ORAL at 17:25

## 2022-12-24 ASSESSMENT — ENCOUNTER SYMPTOMS
NEUROLOGICAL NEGATIVE: 1
MUSCULOSKELETAL NEGATIVE: 1
RESPIRATORY NEGATIVE: 1
PSYCHIATRIC NEGATIVE: 1
EYES NEGATIVE: 1
CARDIOVASCULAR NEGATIVE: 1
GASTROINTESTINAL NEGATIVE: 1

## 2022-12-24 ASSESSMENT — PAIN DESCRIPTION - PAIN TYPE
TYPE: ACUTE PAIN
TYPE: ACUTE PAIN

## 2022-12-24 NOTE — CARE PLAN
The patient is Stable - Low risk of patient condition declining or worsening    Shift Goals  Clinical Goals: Rest  Patient Goals: Rest, pain control  Family Goals: Not present    Patient was updated on plan of care. Patient was medicated per MAR for pain. Patient denies any other concerns at this time.    Progress made toward(s) clinical / shift goals:      Problem: Knowledge Deficit - Standard  Goal: Patient and family/care givers will demonstrate understanding of plan of care, disease process/condition, diagnostic tests and medications  Outcome: Progressing     Problem: Pain - Standard  Goal: Alleviation of pain or a reduction in pain to the patient’s comfort goal  Outcome: Progressing       Patient is not progressing towards the following goals:

## 2022-12-24 NOTE — CARE PLAN
No complaints of pain today. Refused to get up to chair for meals and just wanted to stay in bed. No complications.        The patient is Stable - Low risk of patient condition declining or worsening    Shift Goals  Clinical Goals: comfort  Patient Goals: rest  Family Goals: Not present    Progress made toward(s) clinical / shift goals:        Problem: Pain - Standard  Goal: Alleviation of pain or a reduction in pain to the patient’s comfort goal  Outcome: Progressing     Problem: Fall Risk  Goal: Patient will remain free from falls  Outcome: Progressing       Patient is not progressing towards the following goals:      Problem: Mobility - Stroke  Goal: Patient's capacity to carry out activities will improve  Outcome: Not Progressing

## 2022-12-24 NOTE — PROGRESS NOTES
Hospital Medicine Daily Progress Note    Date of Service  12/24/2022    Chief Complaint  Sina Lewis is a 82 y.o. male admitted 12/5/2022 with weakness and multiple ground-level falls, and inability to get himself up after falling. He has a history of atrial fibrillation, CKD stage III, hypertension, hypothyroidism, dyslipidemia, and tobacco use.    Hospital Course  On admission, patient was febrile and hypoxic.  UA was consistent with UTI.  Chest x-ray showed consolidation.  Patient was started on empiric antibiotic therapy presumed for UTI and aspiration.  There was a concern for prior stroke due to dysarthria and mild left facial droop.  Patient had dementia with neurological deficits. CT head from admission showed no evidence of acute infarct, hemorrhage or mass.    Patient had complained of chest pain.  Troponin was elevated.  EKG showed borderline ST depression in septal and lateral leads. Echocardiogram from 12/7/2022 showed mildly low LV systolic function with LVEF of about 50%. Noted to have a small mobile echodensity on the aortic valve, unclear if vegetation or plaque. Cardiology were consulted.  SUZY showed possible AV vegetation.  ID and cardiology were consulted.   Blood cultures were negative.  Patient was hemodynamically stable.  No further work-up was recommended by ID and cardiology.    Goals of care discussion was held on 12/14/2022.  Decision was made for hospice, and patient was transitioned to comfort care.  On 12/23/2022, I discussed outpatient hospice choices with patient.  He was alert and oriented x3, and was agreeable to discharging home with [facility] hospice.    Interval Problem Update  Stable overnight.  Case management/social work are assisting with placement for hospice.      I have discussed this patient's plan of care and discharge plan at IDT rounds today with Case Management, Nursing, Nursing leadership, and other members of the IDT team.    Consultants/Specialty  None    Code  Status  Comfort Care/DNR    Disposition  Patient is medically cleared for discharge.   Anticipate discharge to to hospice.  I have placed the appropriate orders for post-discharge needs.    Review of Systems  Review of Systems   Unable to perform ROS: Dementia   Constitutional:  Positive for malaise/fatigue.   HENT: Negative.     Eyes: Negative.    Respiratory: Negative.     Cardiovascular: Negative.    Gastrointestinal: Negative.    Genitourinary: Negative.    Musculoskeletal: Negative.    Skin: Negative.    Neurological: Negative.    Endo/Heme/Allergies: Negative.    Psychiatric/Behavioral: Negative.        Physical Exam  Pulse:  [59] 59  BP: (90)/(45) 90/45    Physical Exam  Constitutional:       General: He is not in acute distress.     Appearance: He is ill-appearing.   HENT:      Head: Normocephalic.      Nose: Nose normal.      Mouth/Throat:      Mouth: Mucous membranes are moist.   Eyes:      Pupils: Pupils are equal, round, and reactive to light.   Cardiovascular:      Rate and Rhythm: Normal rate.   Pulmonary:      Effort: Pulmonary effort is normal.   Abdominal:      Palpations: Abdomen is soft.   Musculoskeletal:      Cervical back: Normal range of motion.   Skin:     General: Skin is warm.   Neurological:      Mental Status: He is alert. Mental status is at baseline.   Psychiatric:      Comments: Calm, confused       Fluids  No intake or output data in the 24 hours ending 12/24/22 1305      Laboratory                        Imaging  EC-ECHOCARDIOGRAM COMPLETE W/O CONT   Final Result      US-RUQ   Final Result      1.  Heterogenous hepatic echotexture, possibly hepatic steatosis versus early cirrhosis.   2.  Hepatomegaly.   3.  Cholelithiasis without sonographic evidence of acute pleural status      DX-HIP-UNILATERAL-WITH PELVIS-1 VIEW LEFT   Final Result      No acute osseous abnormality. Left hip arthroplasty is well seated.      DX-CHEST-PORTABLE (1 VIEW)   Final Result      Chronic elevation of the  right hemidiaphragm with right basilar opacity, likely atelectasis.         CT-HEAD W/O   Final Result      1. No CT evidence of acute infarct, hemorrhage or mass.   2. Mild to moderate global parenchymal atrophy. Chronic small vessel ischemic changes.   3. Mild to moderate ethmoid sinus disease.           Assessment/Plan  * Dementia ([facility])  Assessment & Plan  On comfort care.    Frequent falls  Assessment & Plan  History of multiple falls.  On comfort care    UTI (urinary tract infection)  Assessment & Plan  Completed antibiotic treatment.  On comfort care    Sepsis ([facility])  Assessment & Plan  Resolved.  On comfort care    NSTEMI (non-ST elevated myocardial infarction) ([facility])  Assessment & Plan  Elevated troponin with borderline ST depression in septal and lateral leads.  SUZY showed possible AV vegetation.  On comfort care    Community acquired pneumonia of right lower lobe of lung- (present on admission)  Assessment & Plan  Completed course of antibiotics.  On comfort care    Hypothyroid- (present on admission)  Assessment & Plan  Transitioned to comfort care    CKD (chronic kidney disease) stage 3, GFR 30-59 ml/min ([facility])- (present on admission)  Assessment & Plan  On comfort care         VTE prophylaxis: Comfort care

## 2022-12-25 PROCEDURE — 700102 HCHG RX REV CODE 250 W/ 637 OVERRIDE(OP): Performed by: STUDENT IN AN ORGANIZED HEALTH CARE EDUCATION/TRAINING PROGRAM

## 2022-12-25 PROCEDURE — A9270 NON-COVERED ITEM OR SERVICE: HCPCS

## 2022-12-25 PROCEDURE — A9270 NON-COVERED ITEM OR SERVICE: HCPCS | Performed by: INTERNAL MEDICINE

## 2022-12-25 PROCEDURE — 700102 HCHG RX REV CODE 250 W/ 637 OVERRIDE(OP)

## 2022-12-25 PROCEDURE — 770004 HCHG ROOM/CARE - ONCOLOGY PRIVATE *

## 2022-12-25 PROCEDURE — 99231 SBSQ HOSP IP/OBS SF/LOW 25: CPT | Performed by: INTERNAL MEDICINE

## 2022-12-25 PROCEDURE — A9270 NON-COVERED ITEM OR SERVICE: HCPCS | Performed by: STUDENT IN AN ORGANIZED HEALTH CARE EDUCATION/TRAINING PROGRAM

## 2022-12-25 PROCEDURE — 700102 HCHG RX REV CODE 250 W/ 637 OVERRIDE(OP): Performed by: INTERNAL MEDICINE

## 2022-12-25 RX ORDER — OMEPRAZOLE 20 MG/1
20 CAPSULE, DELAYED RELEASE ORAL 2 TIMES DAILY
Status: DISCONTINUED | OUTPATIENT
Start: 2022-12-25 | End: 2022-12-29 | Stop reason: HOSPADM

## 2022-12-25 RX ADMIN — TAMSULOSIN HYDROCHLORIDE 0.4 MG: 0.4 CAPSULE ORAL at 17:03

## 2022-12-25 RX ADMIN — OMEPRAZOLE 20 MG: 20 CAPSULE, DELAYED RELEASE ORAL at 17:03

## 2022-12-25 RX ADMIN — ALUMINUM HYDROXIDE, MAGNESIUM HYDROXIDE, AND DIMETHICONE 10 ML: 400; 400; 40 SUSPENSION ORAL at 14:29

## 2022-12-25 RX ADMIN — LEVOTHYROXINE SODIUM 75 MCG: 0.07 TABLET ORAL at 04:46

## 2022-12-25 RX ADMIN — METOPROLOL TARTRATE 12.5 MG: 25 TABLET, FILM COATED ORAL at 17:03

## 2022-12-25 RX ADMIN — ALUMINUM HYDROXIDE, MAGNESIUM HYDROXIDE, AND DIMETHICONE 10 ML: 400; 400; 40 SUSPENSION ORAL at 08:26

## 2022-12-25 RX ADMIN — BENAZEPRIL HYDROCHLORIDE 10 MG: 20 TABLET ORAL at 19:49

## 2022-12-25 RX ADMIN — QUETIAPINE FUMARATE 200 MG: 200 TABLET, FILM COATED ORAL at 17:02

## 2022-12-25 ASSESSMENT — ENCOUNTER SYMPTOMS
NEUROLOGICAL NEGATIVE: 1
CARDIOVASCULAR NEGATIVE: 1
EYES NEGATIVE: 1
GASTROINTESTINAL NEGATIVE: 1
RESPIRATORY NEGATIVE: 1
MUSCULOSKELETAL NEGATIVE: 1
PSYCHIATRIC NEGATIVE: 1

## 2022-12-25 ASSESSMENT — PAIN DESCRIPTION - PAIN TYPE
TYPE: ACUTE PAIN
TYPE: ACUTE PAIN

## 2022-12-25 NOTE — PROGRESS NOTES
Hospital Medicine Daily Progress Note    Date of Service  12/25/2022    Chief Complaint  Sina Lewis is a 82 y.o. male admitted 12/5/2022 with weakness and multiple ground-level falls, and inability to get himself up after falling. He has a history of atrial fibrillation, CKD stage III, hypertension, hypothyroidism, dyslipidemia, and tobacco use.    Hospital Course  On admission, patient was febrile and hypoxic.  UA was consistent with UTI.  Chest x-ray showed consolidation.  Patient was started on empiric antibiotic therapy presumed for UTI and aspiration.  There was a concern for prior stroke due to dysarthria and mild left facial droop.  Patient had dementia with neurological deficits. CT head from admission showed no evidence of acute infarct, hemorrhage or mass.    Patient had complained of chest pain.  Troponin was elevated.  EKG showed borderline ST depression in septal and lateral leads. Echocardiogram from 12/7/2022 showed mildly low LV systolic function with LVEF of about 50%. Noted to have a small mobile echodensity on the aortic valve, unclear if vegetation or plaque. Cardiology were consulted.  SUZY showed possible AV vegetation.  ID and cardiology were consulted.   Blood cultures were negative.  Patient was hemodynamically stable.  No further work-up was recommended by ID and cardiology.    Goals of care discussion was held on 12/14/2022.  Decision was made for hospice, and patient was transitioned to comfort care.  On 12/23/2022, I discussed outpatient hospice choices with patient.  He was alert and oriented x3, and was agreeable to discharging home with [facility] hospice.    Interval Problem Update  Stable overnight.  Case management/social work are assisting with placement for hospice.      I have discussed this patient's plan of care and discharge plan at IDT rounds today with Case Management, Nursing, Nursing leadership, and other members of the IDT team.    Consultants/Specialty  None    Code  Status  Comfort Care/DNR    Disposition  Patient is medically cleared for discharge.   Anticipate discharge to to hospice.  I have placed the appropriate orders for post-discharge needs.    Review of Systems  Review of Systems   Unable to perform ROS: Dementia   Constitutional:  Positive for malaise/fatigue.   HENT: Negative.     Eyes: Negative.    Respiratory: Negative.     Cardiovascular: Negative.    Gastrointestinal: Negative.    Genitourinary: Negative.    Musculoskeletal: Negative.    Skin: Negative.    Neurological: Negative.    Endo/Heme/Allergies: Negative.    Psychiatric/Behavioral: Negative.        Physical Exam  Temp:  [36.4 °C (97.5 °F)-36.8 °C (98.2 °F)] 36.4 °C (97.5 °F)  Pulse:  [] 64  Resp:  [18-20] 18  BP: ()/(58-65) 88/58  SpO2:  [92 %] 92 %    Physical Exam  Constitutional:       General: He is not in acute distress.     Appearance: He is ill-appearing.   HENT:      Head: Normocephalic.      Nose: Nose normal.      Mouth/Throat:      Mouth: Mucous membranes are moist.   Eyes:      Pupils: Pupils are equal, round, and reactive to light.   Cardiovascular:      Rate and Rhythm: Normal rate.   Pulmonary:      Effort: Pulmonary effort is normal.   Abdominal:      Palpations: Abdomen is soft.   Musculoskeletal:      Cervical back: Normal range of motion.   Skin:     General: Skin is warm.   Neurological:      Mental Status: He is alert. Mental status is at baseline.   Psychiatric:      Comments: Calm, confused       Fluids    Intake/Output Summary (Last 24 hours) at 12/25/2022 1412  Last data filed at 12/25/2022 0400  Gross per 24 hour   Intake 120 ml   Output 370 ml   Net -250 ml         Laboratory                        Imaging  EC-ECHOCARDIOGRAM COMPLETE W/O CONT   Final Result      US-RUQ   Final Result      1.  Heterogenous hepatic echotexture, possibly hepatic steatosis versus early cirrhosis.   2.  Hepatomegaly.   3.  Cholelithiasis without sonographic evidence of acute pleural  status      DX-HIP-UNILATERAL-WITH PELVIS-1 VIEW LEFT   Final Result      No acute osseous abnormality. Left hip arthroplasty is well seated.      DX-CHEST-PORTABLE (1 VIEW)   Final Result      Chronic elevation of the right hemidiaphragm with right basilar opacity, likely atelectasis.         CT-HEAD W/O   Final Result      1. No CT evidence of acute infarct, hemorrhage or mass.   2. Mild to moderate global parenchymal atrophy. Chronic small vessel ischemic changes.   3. Mild to moderate ethmoid sinus disease.           Assessment/Plan  * Dementia ([facility])  Assessment & Plan  On comfort care.    Frequent falls  Assessment & Plan  History of multiple falls.  On comfort care    UTI (urinary tract infection)  Assessment & Plan  Completed antibiotic treatment.  On comfort care    Sepsis ([facility])  Assessment & Plan  Resolved.  On comfort care    NSTEMI (non-ST elevated myocardial infarction) ([facility])  Assessment & Plan  Elevated troponin with borderline ST depression in septal and lateral leads.  SUZY showed possible AV vegetation.  On comfort care    Community acquired pneumonia of right lower lobe of lung- (present on admission)  Assessment & Plan  Completed course of antibiotics.  On comfort care    Hypothyroid- (present on admission)  Assessment & Plan  Transitioned to comfort care    CKD (chronic kidney disease) stage 3, GFR 30-59 ml/min ([facility])- (present on admission)  Assessment & Plan  On comfort care         VTE prophylaxis: Comfort care

## 2022-12-25 NOTE — CARE PLAN
The patient is Stable - Low risk of patient condition declining or worsening    Shift Goals  Clinical Goals: comfort pain managment  Patient Goals: rest  Family Goals: Not present    Progress made toward(s) clinical / shift goals:    Problem: Knowledge Deficit - Standard  Goal: Patient and family/care givers will demonstrate understanding of plan of care, disease process/condition, diagnostic tests and medications  Outcome: Progressing  Note: Patient had complaints of upset stomach PRN Maalox given with effect, denies pain at this time. Noted with forgetfulness, Tolerating diet slept intermittently throughout the shift.       Patient is not progressing towards the following goals:

## 2022-12-25 NOTE — CARE PLAN
The patient is Stable - Low risk of patient condition declining or worsening    Shift Goals  Clinical Goals: Comfort, rest  Patient Goals: Rest  Family Goals: Not present    Progress made toward(s) clinical / shift goals:    Problem: Knowledge Deficit - Standard  Goal: Patient and family/care givers will demonstrate understanding of plan of care, disease process/condition, diagnostic tests and medications  Outcome: Progressing  Note: Pt updated on plan of care, pt reoriented and redirected in conversation as needed. Comfort care measures implemented.      Problem: Pain - Standard  Goal: Alleviation of pain or a reduction in pain to the patient’s comfort goal  Note: Pain assessed using 0-10 verbal pain scale, pt denies any pain at this time.      Problem: Skin Integrity  Goal: Skin integrity is maintained or improved  Outcome: Progressing  Note: Pt is able to turn self from side to side.      Problem: Fall Risk  Goal: Patient will remain free from falls  Outcome: Progressing  Note: Bed alarm on, bed locked and in lowest position, nonslip socks on, pt uses call light appropriately, personal belongings within reach, call light within reach       Patient is not progressing towards the following goals: N/A

## 2022-12-26 LAB
GAMMA INTERFERON BACKGROUND BLD IA-ACNC: 0.05 IU/ML
M TB IFN-G BLD-IMP: NEGATIVE
M TB IFN-G CD4+ BCKGRND COR BLD-ACNC: 0 IU/ML
MITOGEN IGNF BCKGRD COR BLD-ACNC: >10 IU/ML
QFT TB2 - NIL TBQ2: -0.01 IU/ML

## 2022-12-26 PROCEDURE — A9270 NON-COVERED ITEM OR SERVICE: HCPCS

## 2022-12-26 PROCEDURE — 700102 HCHG RX REV CODE 250 W/ 637 OVERRIDE(OP)

## 2022-12-26 PROCEDURE — 770004 HCHG ROOM/CARE - ONCOLOGY PRIVATE *

## 2022-12-26 PROCEDURE — 700102 HCHG RX REV CODE 250 W/ 637 OVERRIDE(OP): Performed by: STUDENT IN AN ORGANIZED HEALTH CARE EDUCATION/TRAINING PROGRAM

## 2022-12-26 PROCEDURE — A9270 NON-COVERED ITEM OR SERVICE: HCPCS | Performed by: INTERNAL MEDICINE

## 2022-12-26 PROCEDURE — 700102 HCHG RX REV CODE 250 W/ 637 OVERRIDE(OP): Performed by: INTERNAL MEDICINE

## 2022-12-26 PROCEDURE — A9270 NON-COVERED ITEM OR SERVICE: HCPCS | Performed by: STUDENT IN AN ORGANIZED HEALTH CARE EDUCATION/TRAINING PROGRAM

## 2022-12-26 PROCEDURE — 99231 SBSQ HOSP IP/OBS SF/LOW 25: CPT | Performed by: INTERNAL MEDICINE

## 2022-12-26 RX ADMIN — TAMSULOSIN HYDROCHLORIDE 0.4 MG: 0.4 CAPSULE ORAL at 16:54

## 2022-12-26 RX ADMIN — OMEPRAZOLE 20 MG: 20 CAPSULE, DELAYED RELEASE ORAL at 04:53

## 2022-12-26 RX ADMIN — OMEPRAZOLE 20 MG: 20 CAPSULE, DELAYED RELEASE ORAL at 16:54

## 2022-12-26 RX ADMIN — QUETIAPINE FUMARATE 200 MG: 200 TABLET, FILM COATED ORAL at 16:54

## 2022-12-26 RX ADMIN — LEVOTHYROXINE SODIUM 75 MCG: 0.07 TABLET ORAL at 04:53

## 2022-12-26 RX ADMIN — METOPROLOL TARTRATE 12.5 MG: 25 TABLET, FILM COATED ORAL at 16:58

## 2022-12-26 ASSESSMENT — PAIN DESCRIPTION - PAIN TYPE
TYPE: ACUTE PAIN
TYPE: ACUTE PAIN

## 2022-12-26 ASSESSMENT — ENCOUNTER SYMPTOMS
CARDIOVASCULAR NEGATIVE: 1
EYES NEGATIVE: 1
GASTROINTESTINAL NEGATIVE: 1
PSYCHIATRIC NEGATIVE: 1
RESPIRATORY NEGATIVE: 1
NEUROLOGICAL NEGATIVE: 1
MUSCULOSKELETAL NEGATIVE: 1

## 2022-12-26 NOTE — CARE PLAN
The patient is Stable - Low risk of patient condition declining or worsening       Shift Goals  Clinical Goals: Comfort  Patient Goals: Sleep  Family Goals: Not present    Progress made toward(s) clinical / shift goals:      Problem: Knowledge Deficit - Standard  Goal: Patient and family/care givers will demonstrate understanding of plan of care, disease process/condition, diagnostic tests and medications  Outcome: Progressing  Note: Pt updated on plan of care, pt reoriented when confused in conversation, pt states understanding for plan of care.      Problem: Pain - Standard  Goal: Alleviation of pain or a reduction in pain to the patient’s comfort goal  Outcome: Progressing  Note: Pain assessed using 0-10 verbal pain scale, pt denies any pain at this time.      Problem: Fall Risk  Goal: Patient will remain free from falls  Outcome: Progressing  Note: Bed alarm on, bed locked and in lowest position, nonslip socks on, pt uses call light appropriately, personal belongings within reach, call light within reach.     Patient is not progressing towards the following goals: N/A

## 2022-12-26 NOTE — PROGRESS NOTES
Hospital Medicine Daily Progress Note    Date of Service  12/26/2022    Chief Complaint  Sina Lewis is a 82 y.o. male admitted 12/5/2022 with weakness and multiple ground-level falls, and inability to get himself up after falling. He has a history of atrial fibrillation, CKD stage III, hypertension, hypothyroidism, dyslipidemia, and tobacco use.    Hospital Course  On admission, patient was febrile and hypoxic.  UA was consistent with UTI.  Chest x-ray showed consolidation.  Patient was started on empiric antibiotic therapy presumed for UTI and aspiration.  There was a concern for prior stroke due to dysarthria and mild left facial droop.  Patient had dementia with neurological deficits. CT head from admission showed no evidence of acute infarct, hemorrhage or mass.    Patient had complained of chest pain.  Troponin was elevated.  EKG showed borderline ST depression in septal and lateral leads. Echocardiogram from 12/7/2022 showed mildly low LV systolic function with LVEF of about 50%. Noted to have a small mobile echodensity on the aortic valve, unclear if vegetation or plaque. Cardiology were consulted.  SUZY showed possible AV vegetation.  ID and cardiology were consulted.   Blood cultures were negative.  Patient was hemodynamically stable.  No further work-up was recommended by ID and cardiology.    Goals of care discussion was held on 12/14/2022.  Decision was made for hospice, and patient was transitioned to comfort care.  On 12/23/2022, I discussed outpatient hospice choices with patient.  He was alert and oriented x3, and was agreeable to discharging home with [facility] hospice.    Interval Problem Update  Stable overnight.  Case management/social work are assisting with placement for hospice.      I have discussed this patient's plan of care and discharge plan at IDT rounds today with Case Management, Nursing, Nursing leadership, and other members of the IDT team.    Consultants/Specialty  None    Code  Status  Comfort Care/DNR    Disposition  Patient is medically cleared for discharge.   Anticipate discharge to to hospice.  I have placed the appropriate orders for post-discharge needs.    Review of Systems  Review of Systems   Unable to perform ROS: Dementia   Constitutional:  Positive for malaise/fatigue.   HENT: Negative.     Eyes: Negative.    Respiratory: Negative.     Cardiovascular: Negative.    Gastrointestinal: Negative.    Genitourinary: Negative.    Musculoskeletal: Negative.    Skin: Negative.    Neurological: Negative.    Endo/Heme/Allergies: Negative.    Psychiatric/Behavioral: Negative.        Physical Exam  Temp:  [36.2 °C (97.2 °F)] 36.2 °C (97.2 °F)  Pulse:  [71-86] 71  Resp:  [16] 16  BP: ()/(49-66) 77/49  SpO2:  [94 %] 94 %    Physical Exam  Constitutional:       General: He is not in acute distress.     Appearance: He is ill-appearing.   HENT:      Head: Normocephalic.      Nose: Nose normal.      Mouth/Throat:      Mouth: Mucous membranes are moist.   Eyes:      Pupils: Pupils are equal, round, and reactive to light.   Cardiovascular:      Rate and Rhythm: Normal rate.   Pulmonary:      Effort: Pulmonary effort is normal.   Abdominal:      Palpations: Abdomen is soft.   Musculoskeletal:      Cervical back: Normal range of motion.   Skin:     General: Skin is warm.   Neurological:      Mental Status: He is alert. Mental status is at baseline.   Psychiatric:      Comments: Calm, confused       Fluids    Intake/Output Summary (Last 24 hours) at 12/26/2022 1515  Last data filed at 12/26/2022 1359  Gross per 24 hour   Intake --   Output 1150 ml   Net -1150 ml         Laboratory                        Imaging  EC-ECHOCARDIOGRAM COMPLETE W/O CONT   Final Result      US-RUQ   Final Result      1.  Heterogenous hepatic echotexture, possibly hepatic steatosis versus early cirrhosis.   2.  Hepatomegaly.   3.  Cholelithiasis without sonographic evidence of acute pleural status       DX-HIP-UNILATERAL-WITH PELVIS-1 VIEW LEFT   Final Result      No acute osseous abnormality. Left hip arthroplasty is well seated.      DX-CHEST-PORTABLE (1 VIEW)   Final Result      Chronic elevation of the right hemidiaphragm with right basilar opacity, likely atelectasis.         CT-HEAD W/O   Final Result      1. No CT evidence of acute infarct, hemorrhage or mass.   2. Mild to moderate global parenchymal atrophy. Chronic small vessel ischemic changes.   3. Mild to moderate ethmoid sinus disease.           Assessment/Plan  * Dementia ([facility])  Assessment & Plan  On comfort care.    Frequent falls  Assessment & Plan  History of multiple falls.  On comfort care    UTI (urinary tract infection)  Assessment & Plan  Completed antibiotic treatment.  On comfort care    Sepsis ([facility])  Assessment & Plan  Resolved.  On comfort care    NSTEMI (non-ST elevated myocardial infarction) ([facility])  Assessment & Plan  Elevated troponin with borderline ST depression in septal and lateral leads.  SUZY showed possible AV vegetation.  On comfort care    Community acquired pneumonia of right lower lobe of lung- (present on admission)  Assessment & Plan  Completed course of antibiotics.  On comfort care    Hypothyroid- (present on admission)  Assessment & Plan  Transitioned to comfort care    CKD (chronic kidney disease) stage 3, GFR 30-59 ml/min ([facility])- (present on admission)  Assessment & Plan  On comfort care         VTE prophylaxis: Comfort care

## 2022-12-26 NOTE — CARE PLAN
The patient is Stable - Low risk of patient condition declining or worsening    Shift Goals  Clinical Goals: comfort  Patient Goals: rest  Family Goals: Not present    Progress made toward(s) clinical / shift goals:    Problem: Knowledge Deficit - Standard  Goal: Patient and family/care givers will demonstrate understanding of plan of care, disease process/condition, diagnostic tests and medications  Outcome: Progressing  Note:   Patient had complaints of upset stomach PRN Maalox given with effect, denies pain at this time. Ambulated to restroom with x1 assist and FWW, had BM. Noted with forgetfulness, Tolerating diet slept intermittently throughout the shift       Patient is not progressing towards the following goals:

## 2022-12-27 PROBLEM — J18.9 COMMUNITY ACQUIRED PNEUMONIA OF RIGHT LOWER LOBE OF LUNG: Status: RESOLVED | Noted: 2022-12-05 | Resolved: 2022-12-27

## 2022-12-27 PROBLEM — A41.9 SEPSIS (HCC): Status: RESOLVED | Noted: 2022-12-05 | Resolved: 2022-12-27

## 2022-12-27 PROBLEM — G47.09 OTHER INSOMNIA: Status: ACTIVE | Noted: 2022-12-27

## 2022-12-27 PROCEDURE — 99231 SBSQ HOSP IP/OBS SF/LOW 25: CPT | Performed by: INTERNAL MEDICINE

## 2022-12-27 PROCEDURE — A9270 NON-COVERED ITEM OR SERVICE: HCPCS | Performed by: INTERNAL MEDICINE

## 2022-12-27 PROCEDURE — A9270 NON-COVERED ITEM OR SERVICE: HCPCS | Performed by: STUDENT IN AN ORGANIZED HEALTH CARE EDUCATION/TRAINING PROGRAM

## 2022-12-27 PROCEDURE — A9270 NON-COVERED ITEM OR SERVICE: HCPCS

## 2022-12-27 PROCEDURE — 700102 HCHG RX REV CODE 250 W/ 637 OVERRIDE(OP): Performed by: STUDENT IN AN ORGANIZED HEALTH CARE EDUCATION/TRAINING PROGRAM

## 2022-12-27 PROCEDURE — 770004 HCHG ROOM/CARE - ONCOLOGY PRIVATE *

## 2022-12-27 PROCEDURE — 700102 HCHG RX REV CODE 250 W/ 637 OVERRIDE(OP): Performed by: INTERNAL MEDICINE

## 2022-12-27 PROCEDURE — 700102 HCHG RX REV CODE 250 W/ 637 OVERRIDE(OP)

## 2022-12-27 RX ORDER — LORAZEPAM 0.5 MG/1
0.5 TABLET ORAL 2 TIMES DAILY PRN
Status: DISCONTINUED | OUTPATIENT
Start: 2022-12-27 | End: 2022-12-29 | Stop reason: HOSPADM

## 2022-12-27 RX ORDER — LORAZEPAM 0.5 MG/1
0.5 TABLET ORAL EVERY 6 HOURS PRN
Status: DISCONTINUED | OUTPATIENT
Start: 2022-12-27 | End: 2022-12-27

## 2022-12-27 RX ORDER — TRAZODONE HYDROCHLORIDE 100 MG/1
50 TABLET ORAL
Status: DISCONTINUED | OUTPATIENT
Start: 2022-12-27 | End: 2022-12-29 | Stop reason: HOSPADM

## 2022-12-27 RX ADMIN — OMEPRAZOLE 20 MG: 20 CAPSULE, DELAYED RELEASE ORAL at 17:30

## 2022-12-27 RX ADMIN — QUETIAPINE FUMARATE 200 MG: 200 TABLET, FILM COATED ORAL at 17:31

## 2022-12-27 RX ADMIN — OXYCODONE HYDROCHLORIDE 10 MG: 10 TABLET ORAL at 04:04

## 2022-12-27 RX ADMIN — BENAZEPRIL HYDROCHLORIDE 10 MG: 20 TABLET ORAL at 20:22

## 2022-12-27 RX ADMIN — LORAZEPAM 0.5 MG: 0.5 TABLET ORAL at 15:12

## 2022-12-27 RX ADMIN — TAMSULOSIN HYDROCHLORIDE 0.4 MG: 0.4 CAPSULE ORAL at 17:30

## 2022-12-27 RX ADMIN — LEVOTHYROXINE SODIUM 75 MCG: 0.07 TABLET ORAL at 04:40

## 2022-12-27 RX ADMIN — TRAZODONE HYDROCHLORIDE 50 MG: 100 TABLET ORAL at 20:22

## 2022-12-27 RX ADMIN — METOPROLOL TARTRATE 12.5 MG: 25 TABLET, FILM COATED ORAL at 17:30

## 2022-12-27 RX ADMIN — OMEPRAZOLE 20 MG: 20 CAPSULE, DELAYED RELEASE ORAL at 04:39

## 2022-12-27 ASSESSMENT — ENCOUNTER SYMPTOMS
PSYCHIATRIC NEGATIVE: 1
NEUROLOGICAL NEGATIVE: 1
EYES NEGATIVE: 1
RESPIRATORY NEGATIVE: 1
CARDIOVASCULAR NEGATIVE: 1
MUSCULOSKELETAL NEGATIVE: 1
GASTROINTESTINAL NEGATIVE: 1

## 2022-12-27 ASSESSMENT — PAIN DESCRIPTION - PAIN TYPE
TYPE: ACUTE PAIN

## 2022-12-27 NOTE — DISCHARGE PLANNING
Case Management Discharge Planning    Admission Date: 12/5/2022  GMLOS: 5  ALOS: 22    6-Clicks ADL Score: 20  6-Clicks Mobility Score: 18      Anticipated Discharge Dispo: Discharge Disposition: D/T to hospice home (50) ([facility]    DME Needed: No    Action(s) Taken: Updated Provider/Nurse on Discharge Plan    Escalations Completed: Provider, Long Length of Stay Committee , and Leadership    Medically Clear: Yes    Next Steps: Patient discussed during morning IDT rounds with team. Patient is medically cleared to go home with hospice, [facility]. BRADY spoke with Lexy with [facility] this am. She stated that Piper Dooley from [facility] will be coming to [facility] to evaluate patient for acceptance to  and at that time will give muñoz for stay. Piper Dooley's number is [phone]. Patient will need XAVIER at that time to be transferred if accepted. Leadership is aware. Dr. Kerr also aware with [facility].     Piper Dooley from  states she will be at facility to evaluate patient between 2-230pm today    1415: BRADY spoke with Piper Dooley,  owner, and she states she will accept him pending XAVIER, (cost: $5000.00/month), Covid swab and TB test (neg on 12/23/22). BRADY called Lexy with [facility] to give update. She will call Piper Dooley back with when she will accept him and set up DME & transportation.     1430: XAVIER wrote up and faxed to Piper Dooley at [phone],  owner, to sign and return for director approval and signature. Fax number is not accepting fax. Attempted multiple times. BRADY called Piper Dooley left message to return call with a working fax number.     1548: Piper Dooley returned call, new fax number [phone]. She requested H&P and med list to be faxed to her. BRADY requested that she return signed XAVIER asap so we can get signature for director for approval. She agreed.     Barriers to Discharge: Pending Placement    Is the patient up for discharge tomorrow: No, possibly today or tomorrow pending evaluation from  owner  and XAVIER.

## 2022-12-27 NOTE — PROGRESS NOTES
Hospital Medicine Daily Progress Note    Date of Service  12/27/2022    Chief Complaint  Sina Lewis is a 82 y.o. male admitted 12/5/2022 with weakness and multiple ground-level falls, and inability to get himself up after falling. He has a history of atrial fibrillation, CKD stage III, hypertension, hypothyroidism, dyslipidemia, and tobacco use.    Hospital Course  On admission, patient was febrile and hypoxic.  UA was consistent with UTI.  Chest x-ray showed consolidation.  Patient was started on empiric antibiotic therapy presumed for UTI and aspiration.  There was a concern for prior stroke due to dysarthria and mild left facial droop.  Patient had dementia with neurological deficits. CT head from admission showed no evidence of acute infarct, hemorrhage or mass.    Patient had complained of chest pain.  Troponin was elevated.  EKG showed borderline ST depression in septal and lateral leads. Echocardiogram from 12/7/2022 showed mildly low LV systolic function with LVEF of about 50%. Noted to have a small mobile echodensity on the aortic valve, unclear if vegetation or plaque. Cardiology were consulted.  SUZY showed possible AV vegetation.  ID and cardiology were consulted.   Blood cultures were negative.  Patient was hemodynamically stable.  No further work-up was recommended by ID and cardiology.    Goals of care discussion was held on 12/14/2022.  Decision was made for hospice, and patient was transitioned to comfort care.  On 12/23/2022, I discussed outpatient hospice choices with patient.  He was alert and oriented x3, and was agreeable to discharging home with [facility] hospice.    Interval Problem Update  Stable overnight.  Patient is requesting sleep aid.Trazodone added. Case management/social work are assisting with placement for hospice.      I have discussed this patient's plan of care and discharge plan at IDT rounds today with Case Management, Nursing, Nursing leadership, and other members of the  IDT team.    Consultants/Specialty  None    Code Status  Comfort Care/DNR    Disposition  Patient is medically cleared for discharge.   Anticipate discharge to to hospice.  I have placed the appropriate orders for post-discharge needs.    Review of Systems  Review of Systems   Constitutional:  Positive for malaise/fatigue.   HENT: Negative.     Eyes: Negative.    Respiratory: Negative.     Cardiovascular: Negative.    Gastrointestinal: Negative.    Genitourinary: Negative.    Musculoskeletal: Negative.    Skin: Negative.    Neurological: Negative.    Endo/Heme/Allergies: Negative.    Psychiatric/Behavioral: Negative.        Physical Exam  Temp:  [36.3 °C (97.4 °F)-36.7 °C (98.1 °F)] 36.3 °C (97.4 °F)  Pulse:  [60-86] 60  Resp:  [16] 16  BP: ()/(46-62) 98/55  SpO2:  [94 %] 94 %    Physical Exam  Constitutional:       General: He is not in acute distress.     Appearance: He is ill-appearing.   HENT:      Head: Normocephalic.      Nose: Nose normal.      Mouth/Throat:      Mouth: Mucous membranes are moist.   Eyes:      Pupils: Pupils are equal, round, and reactive to light.   Cardiovascular:      Rate and Rhythm: Normal rate.   Pulmonary:      Effort: Pulmonary effort is normal.   Abdominal:      Palpations: Abdomen is soft.   Musculoskeletal:      Cervical back: Normal range of motion.   Skin:     General: Skin is warm.   Neurological:      Mental Status: He is alert. Mental status is at baseline.   Psychiatric:      Comments: Calm, confused       Fluids    Intake/Output Summary (Last 24 hours) at 12/27/2022 1422  Last data filed at 12/26/2022 2011  Gross per 24 hour   Intake --   Output 0 ml   Net 0 ml       Laboratory                        Imaging  EC-ECHOCARDIOGRAM COMPLETE W/O CONT   Final Result      US-RUQ   Final Result      1.  Heterogenous hepatic echotexture, possibly hepatic steatosis versus early cirrhosis.   2.  Hepatomegaly.   3.  Cholelithiasis without sonographic evidence of acute pleural  status      DX-HIP-UNILATERAL-WITH PELVIS-1 VIEW LEFT   Final Result      No acute osseous abnormality. Left hip arthroplasty is well seated.      DX-CHEST-PORTABLE (1 VIEW)   Final Result      Chronic elevation of the right hemidiaphragm with right basilar opacity, likely atelectasis.         CT-HEAD W/O   Final Result      1. No CT evidence of acute infarct, hemorrhage or mass.   2. Mild to moderate global parenchymal atrophy. Chronic small vessel ischemic changes.   3. Mild to moderate ethmoid sinus disease.           Assessment/Plan  * Dementia ([facility])  Assessment & Plan  On comfort care.    Other insomnia  Assessment & Plan  Patient is requesting sleep aid.  Trazodone added.    Frequent falls  Assessment & Plan  History of multiple falls.  On comfort care    UTI (urinary tract infection)  Assessment & Plan  Completed antibiotic treatment.  On comfort care    Sepsis ([facility])  Assessment & Plan  Resolved.  On comfort care    NSTEMI (non-ST elevated myocardial infarction) ([facility])  Assessment & Plan  Elevated troponin with borderline ST depression in septal and lateral leads.  SUZY showed possible AV vegetation.  On comfort care    Community acquired pneumonia of right lower lobe of lung- (present on admission)  Assessment & Plan  Completed course of antibiotics.  On comfort care    Hypothyroid- (present on admission)  Assessment & Plan  Transitioned to comfort care    CKD (chronic kidney disease) stage 3, GFR 30-59 ml/min ([facility])- (present on admission)  Assessment & Plan  On comfort care         VTE prophylaxis: Comfort care

## 2022-12-27 NOTE — CARE PLAN
The patient is Watcher - Medium risk of patient condition declining or worsening    Shift Goals  Clinical Goals: Comfort, safety  Patient Goals: Sleep  Family Goals: Not present    Progress made toward(s) clinical / shift goals:    Problem: Knowledge Deficit - Standard  Goal: Patient and family/care givers will demonstrate understanding of plan of care, disease process/condition, diagnostic tests and medications  Outcome: Progressing  Note: Pt A/Ox3, pt reoriented when confused in conversation and states understanding for comfort care measures.     Problem: Pain - Standard  Goal: Alleviation of pain or a reduction in pain to the patient’s comfort goal  Outcome: Progressing  Note: Pain assessed using 0-10 verbal pain scale, pt denies any pain at this time.      Problem: Fall Risk  Goal: Patient will remain free from falls  Outcome: Progressing  Note: Bed alarm on, bed locked and in lowest position, personal belongings within reach, call light within reach, nonslip socks on.           Patient is not progressing towards the following goals: N/A

## 2022-12-27 NOTE — CARE PLAN
A&Ox3, expressing increasing anxiety. MD notified and new orders obtained. Up with 1PA and FWW. Continent of bowel; condom cath in place. Denies nausea and pain. Expresses frustration and feeling uncomfortable in the bed and having difficulty sleeping; resolved with cues to reposition and adjust bed. Good PO intake. Call light and belongings within reach, calls appropriately to make needs known.       The patient is Stable - Low risk of patient condition declining or worsening    Shift Goals  Clinical Goals: Comfort. Safety  Patient Goals: Rest  Family Goals: NA    Progress made toward(s) clinical / shift goals:  Patient remains comfortable and without fall or injury this shift. Comfort measures in place.     Patient is not progressing towards the following goals: NA

## 2022-12-27 NOTE — DISCHARGE PLANNING
Agency/Facility Name: [facility]  Spoke To: Lexy  Outcome: DPA was notified Hospice will require a note from MD stating Pt has capacity. DPA to follow up.    1030:  Agency/Facility Name: [facility]  Spoke To: Lexy  Outcome: DPA was notified progress note from MD will suffice for stating Pt has capacity. Lexy states she will have [facility] owner, Piper Dooley come assess Pt today, 12/27.     Care Team notified.

## 2022-12-27 NOTE — CARE PLAN
The patient is Watcher - Medium risk of patient condition declining or worsening    Shift Goals  Clinical Goals: comfort  Patient Goals: comfort  Family Goals: Not present    Progress made toward(s) clinical / shift goals:    Problem: Knowledge Deficit - Standard  Goal: Patient and family/care givers will demonstrate understanding of plan of care, disease process/condition, diagnostic tests and medications  Outcome: Progressing  Note:   Patient had complaints of upset stomach PRN Maalox given with effect, denies pain at this time. Ambulated to restroom with x1 assist and FWW. Noted with forgetfulness, Tolerating diet slept intermittently throughout the shift       Patient is not progressing towards the following goals:

## 2022-12-28 PROCEDURE — A9270 NON-COVERED ITEM OR SERVICE: HCPCS | Performed by: INTERNAL MEDICINE

## 2022-12-28 PROCEDURE — 770004 HCHG ROOM/CARE - ONCOLOGY PRIVATE *

## 2022-12-28 PROCEDURE — A9270 NON-COVERED ITEM OR SERVICE: HCPCS | Performed by: STUDENT IN AN ORGANIZED HEALTH CARE EDUCATION/TRAINING PROGRAM

## 2022-12-28 PROCEDURE — A9270 NON-COVERED ITEM OR SERVICE: HCPCS

## 2022-12-28 PROCEDURE — 700102 HCHG RX REV CODE 250 W/ 637 OVERRIDE(OP): Performed by: INTERNAL MEDICINE

## 2022-12-28 PROCEDURE — 99231 SBSQ HOSP IP/OBS SF/LOW 25: CPT | Performed by: INTERNAL MEDICINE

## 2022-12-28 PROCEDURE — 700102 HCHG RX REV CODE 250 W/ 637 OVERRIDE(OP): Performed by: STUDENT IN AN ORGANIZED HEALTH CARE EDUCATION/TRAINING PROGRAM

## 2022-12-28 PROCEDURE — 700102 HCHG RX REV CODE 250 W/ 637 OVERRIDE(OP)

## 2022-12-28 RX ORDER — HYDROXYZINE HYDROCHLORIDE 25 MG/1
25 TABLET, FILM COATED ORAL 3 TIMES DAILY PRN
Qty: 30 TABLET | Refills: 0 | Status: SHIPPED
Start: 2022-12-28

## 2022-12-28 RX ORDER — HYDROXYZINE HYDROCHLORIDE 25 MG/1
25 TABLET, FILM COATED ORAL 3 TIMES DAILY PRN
Status: DISCONTINUED | OUTPATIENT
Start: 2022-12-28 | End: 2022-12-29 | Stop reason: HOSPADM

## 2022-12-28 RX ADMIN — TAMSULOSIN HYDROCHLORIDE 0.4 MG: 0.4 CAPSULE ORAL at 17:01

## 2022-12-28 RX ADMIN — BENAZEPRIL HYDROCHLORIDE 10 MG: 20 TABLET ORAL at 20:58

## 2022-12-28 RX ADMIN — LORAZEPAM 0.5 MG: 0.5 TABLET ORAL at 15:05

## 2022-12-28 RX ADMIN — QUETIAPINE FUMARATE 200 MG: 200 TABLET, FILM COATED ORAL at 17:02

## 2022-12-28 RX ADMIN — METOPROLOL TARTRATE 12.5 MG: 25 TABLET, FILM COATED ORAL at 18:00

## 2022-12-28 RX ADMIN — OMEPRAZOLE 20 MG: 20 CAPSULE, DELAYED RELEASE ORAL at 17:01

## 2022-12-28 RX ADMIN — OMEPRAZOLE 20 MG: 20 CAPSULE, DELAYED RELEASE ORAL at 05:09

## 2022-12-28 RX ADMIN — LEVOTHYROXINE SODIUM 75 MCG: 0.07 TABLET ORAL at 05:09

## 2022-12-28 RX ADMIN — METOPROLOL TARTRATE 12.5 MG: 25 TABLET, FILM COATED ORAL at 05:09

## 2022-12-28 RX ADMIN — TRAZODONE HYDROCHLORIDE 50 MG: 100 TABLET ORAL at 20:59

## 2022-12-28 RX ADMIN — HYDROXYZINE HYDROCHLORIDE 25 MG: 25 TABLET, FILM COATED ORAL at 17:01

## 2022-12-28 ASSESSMENT — ENCOUNTER SYMPTOMS
MUSCULOSKELETAL NEGATIVE: 1
NEUROLOGICAL NEGATIVE: 1
RESPIRATORY NEGATIVE: 1
CARDIOVASCULAR NEGATIVE: 1
EYES NEGATIVE: 1
PSYCHIATRIC NEGATIVE: 1
GASTROINTESTINAL NEGATIVE: 1

## 2022-12-28 ASSESSMENT — PAIN DESCRIPTION - PAIN TYPE
TYPE: ACUTE PAIN
TYPE: ACUTE PAIN

## 2022-12-28 NOTE — DISCHARGE SUMMARY
Discharge Summary    CHIEF COMPLAINT ON ADMISSION  Chief Complaint   Patient presents with    Fall     BIB EMS from home for 2 falls over the last couple days, hx of dementia, pt is poor historian, has some family that checks on him, c/o left hip pain       Reason for Admission  ems    Admission Date  12/5/2022     CODE STATUS  Comfort Care/DNR    HPI & HOSPITAL COURSE  Sina Lewis is a 82 y.o. male admitted 12/5/2022 with weakness and multiple ground-level falls, and inability to get himself up after falling. He has a history of atrial fibrillation, CKD stage III, hypertension, hypothyroidism, dyslipidemia, and tobacco use.     On admission, patient was febrile and hypoxic.  UA was consistent with UTI.  Chest x-ray showed consolidation.  Patient was started on empiric antibiotic therapy presumed for UTI and aspiration.  There was a concern for prior stroke due to dysarthria and mild left facial droop.  Patient had dementia with neurological deficits. CT head from admission showed no evidence of acute infarct, hemorrhage or mass.     Patient had complained of chest pain.  Troponin was elevated.  EKG showed borderline ST depression in septal and lateral leads. Echocardiogram from 12/7/2022 showed mildly low LV systolic function with LVEF of about 50%. Noted to have a small mobile echodensity on the aortic valve, unclear if vegetation or plaque. Cardiology were consulted.  SUZY showed possible AV vegetation.  ID and cardiology were consulted.   Blood cultures were negative.  Patient was hemodynamically stable.  No further work-up was recommended by ID and cardiology.     Goals of care discussion was held on 12/14/2022.  Decision was made for hospice, and patient was transitioned to comfort care.  On 12/23/2022, I discussed outpatient hospice choices with patient.  He was alert and oriented x3, and was agreeable to discharging home with [facility] hospice. Patient requested sleep aid.Trazodone added.  Ativan was added  for anxiety.    Therefore, he is discharged in guarded and stable condition to hospice.        FOLLOW UP ITEMS POST DISCHARGE  Hospice    DISCHARGE DIAGNOSES  Principal Problem:    Dementia ([facility]) POA: Unknown  Active Problems:    CKD (chronic kidney disease) stage 3, GFR 30-59 ml/min ([facility]) POA: Yes    Hypothyroid POA: Yes    NSTEMI (non-ST elevated myocardial infarction) ([facility]) POA: Unknown    UTI (urinary tract infection) POA: Unknown    Frequent falls POA: Unknown    Stroke (cerebrum) ([facility]) POA: Unknown    Other insomnia POA: Unknown  Resolved Problems:    Diarrhea of presumed infectious origin POA: Unknown    Community acquired pneumonia of right lower lobe of lung POA: Yes    Sepsis ([facility]) POA: Unknown    Hypokalemia POA: Unknown    Thrombocytopenia ([facility]) POA: Unknown    Prediabetes POA: Unknown    Elevated CPK POA: Unknown    Delirium POA: Unknown    Chronic idiopathic constipation POA: Unknown      FOLLOW UP  No future appointments.  No follow-up provider specified.    MEDICATIONS ON DISCHARGE     Medication List        CONTINUE taking these medications        Instructions   benazepril 20 MG Tabs  Commonly known as: LOTENSIN   Take 10 mg by mouth every evening.  Dose: 10 mg     levothyroxine 88 MCG Tabs  Commonly known as: SYNTHROID   Take 88 mcg by mouth every morning on an empty stomach.  Dose: 88 mcg     LORazepam 2 MG tablet  Commonly known as: ATIVAN   Take 2 mg by mouth 2 times a day. 4 mg in the AM  2 mg in the PM  Dose: 2 mg     omeprazole 40 MG delayed-release capsule  Commonly known as: PRILOSEC   Take 40 mg by mouth 2 times a day.  Dose: 40 mg     quetiapine 400 MG tablet  Commonly known as: SEROQUEL   Take 200 mg by mouth every evening.  Dose: 200 mg     tamsulosin 0.4 MG capsule  Commonly known as: FLOMAX   Take 0.4 mg by mouth every day.  Dose: 0.4 mg            STOP taking these medications      aspirin 81 MG tablet     docusate sodium 100 MG Caps  Commonly known as: COLACE     ketoconazole 2 %  Crea  Commonly known as: NIZORAL     lovastatin 40 MG tablet  Commonly known as: MEVACOR     VITAMIN D PO              Allergies  Allergies   Allergen Reactions    Nkda [No Known Drug Allergy]        DIET  Orders Placed This Encounter   Procedures    Diet Order Diet: Level 5 - Minced and Moist (Meds with liquid wash.); Liquid level: Level 0 - Thin     Standing Status:   Standing     Number of Occurrences:   1     Order Specific Question:   Diet:     Answer:   Level 5 - Minced and Moist [24]     Comments:   Meds with liquid wash.     Order Specific Question:   Liquid level     Answer:   Level 0 - Thin       ACTIVITY  As tolerated.      LINES, DRAINS, AND WOUNDS  This is an automated list. Peripheral IVs will be removed prior to discharge.     External Urinary Catheter (Condom) (Active)   Collection Container Standard drainage bag 12/27/22 2025   Output (mL) 0 mL 12/26/22 2011      Wound 12/07/22  Groin (Active)   Wound Image   12/11/22 2000   Site Assessment SILVIO 12/27/22 2025   Periwound Assessment SILVIO 12/27/22 2025   Margins SILVIO 12/27/22 2025   Closure SILVIO 12/21/22 2050   Drainage Amount SILVIO 12/20/22 2120   Drainage Description SILVIO 12/20/22 2120   Treatments Other (Comment) 12/10/22 1923   Wound Cleansing Foam Cleanser/Washcloth 12/10/22 1923   Dressing Cleansing/Solutions Antifungal Therapy 12/10/22 1923   Dressing Options Open to Air 12/21/22 1102                  MENTAL STATUS ON TRANSFER  Level of Consciousness: Alert          CONSULTATIONS  None    PROCEDURES  None    LABORATORY  Lab Results   Component Value Date    SODIUM 137 12/11/2022    POTASSIUM 3.4 (L) 12/11/2022    CHLORIDE 103 12/11/2022    CO2 25 12/11/2022    GLUCOSE 101 (H) 12/11/2022    BUN 12 12/11/2022    CREATININE 1.35 12/11/2022    CREATININE 1.7 (H) 03/27/2009        Lab Results   Component Value Date    WBC 5.6 12/08/2022    HEMOGLOBIN 11.5 (L) 12/08/2022    HEMATOCRIT 34.1 (L) 12/08/2022    PLATELETCT 74 (L) 12/08/2022        Total time of  the discharge process exceeds 32 minutes.

## 2022-12-28 NOTE — CARE PLAN
The patient is Stable - Low risk of patient condition declining or worsening    Shift Goals  Clinical Goals: Comfort, safety, rest  Patient Goals: Sleep  Family Goals: NA    Progress made toward(s) clinical / shift goals:    Problem: Knowledge Deficit - Standard  Goal: Patient and family/care givers will demonstrate understanding of plan of care, disease process/condition, diagnostic tests and medications  Outcome: Progressing  Note: Pt updated on plan of care, pt states trazadone has helped him sleep and states understanding for possible discharge. Pt A/Ox3 and reoriented as needed. All questions answered at this time.      Problem: Pain - Standard  Goal: Alleviation of pain or a reduction in pain to the patient’s comfort goal  Outcome: Progressing  Note: Pain assessed using 0-10 verbal pain scale, pt denies any pain at this time.        Patient is not progressing towards the following goals: N/A

## 2022-12-28 NOTE — PROGRESS NOTES
Hospital Medicine Daily Progress Note    Date of Service  12/28/2022    Chief Complaint  Sina Lewis is a 82 y.o. male admitted 12/5/2022 with weakness and multiple ground-level falls, and inability to get himself up after falling. He has a history of atrial fibrillation, CKD stage III, hypertension, hypothyroidism, dyslipidemia, and tobacco use.    Hospital Course  On admission, patient was febrile and hypoxic.  UA was consistent with UTI.  Chest x-ray showed consolidation.  Patient was started on empiric antibiotic therapy presumed for UTI and aspiration.  There was a concern for prior stroke due to dysarthria and mild left facial droop.  Patient had dementia with neurological deficits. CT head from admission showed no evidence of acute infarct, hemorrhage or mass.    Patient had complained of chest pain.  Troponin was elevated.  EKG showed borderline ST depression in septal and lateral leads. Echocardiogram from 12/7/2022 showed mildly low LV systolic function with LVEF of about 50%. Noted to have a small mobile echodensity on the aortic valve, unclear if vegetation or plaque. Cardiology were consulted.  SUZY showed possible AV vegetation.  ID and cardiology were consulted.   Blood cultures were negative.  Patient was hemodynamically stable.  No further work-up was recommended by ID and cardiology.    Goals of care discussion was held on 12/14/2022.  Decision was made for hospice, and patient was transitioned to comfort care.  On 12/23/2022, I discussed outpatient hospice choices with patient.  He was alert and oriented x3, and was agreeable to discharging home with [facility] hospice.    Interval Problem Update  Stable overnight.  Case management/social work are assisting with placement for hospice.      I have discussed this patient's plan of care and discharge plan at IDT rounds today with Case Management, Nursing, Nursing leadership, and other members of the IDT team.    Consultants/Specialty  None    Code  Status  Comfort Care/DNR    Disposition  Patient is medically cleared for discharge.   Anticipate discharge to to hospice.  I have placed the appropriate orders for post-discharge needs.    Review of Systems  Review of Systems   Unable to perform ROS: Dementia   Constitutional:  Positive for malaise/fatigue.   HENT: Negative.     Eyes: Negative.    Respiratory: Negative.     Cardiovascular: Negative.    Gastrointestinal: Negative.    Genitourinary: Negative.    Musculoskeletal: Negative.    Skin: Negative.    Neurological: Negative.    Endo/Heme/Allergies: Negative.    Psychiatric/Behavioral: Negative.        Physical Exam  Pulse:  [61-76] 62  BP: (104-130)/(47-70) 104/47    Physical Exam  Constitutional:       General: He is not in acute distress.     Appearance: He is ill-appearing.   HENT:      Head: Normocephalic.      Nose: Nose normal.      Mouth/Throat:      Mouth: Mucous membranes are moist.   Eyes:      Pupils: Pupils are equal, round, and reactive to light.   Cardiovascular:      Rate and Rhythm: Normal rate.   Pulmonary:      Effort: Pulmonary effort is normal.   Abdominal:      Palpations: Abdomen is soft.   Musculoskeletal:      Cervical back: Normal range of motion.   Skin:     General: Skin is warm.   Neurological:      Mental Status: He is alert. Mental status is at baseline.   Psychiatric:      Comments: Calm, confused       Fluids    Intake/Output Summary (Last 24 hours) at 12/28/2022 1442  Last data filed at 12/28/2022 0512  Gross per 24 hour   Intake --   Output 700 ml   Net -700 ml         Laboratory                        Imaging  EC-ECHOCARDIOGRAM COMPLETE W/O CONT   Final Result      US-RUQ   Final Result      1.  Heterogenous hepatic echotexture, possibly hepatic steatosis versus early cirrhosis.   2.  Hepatomegaly.   3.  Cholelithiasis without sonographic evidence of acute pleural status      DX-HIP-UNILATERAL-WITH PELVIS-1 VIEW LEFT   Final Result      No acute osseous abnormality. Left  hip arthroplasty is well seated.      DX-CHEST-PORTABLE (1 VIEW)   Final Result      Chronic elevation of the right hemidiaphragm with right basilar opacity, likely atelectasis.         CT-HEAD W/O   Final Result      1. No CT evidence of acute infarct, hemorrhage or mass.   2. Mild to moderate global parenchymal atrophy. Chronic small vessel ischemic changes.   3. Mild to moderate ethmoid sinus disease.           Assessment/Plan  * Dementia ([facility])  Assessment & Plan  On comfort care.    Other insomnia  Assessment & Plan  Patient is requesting sleep aid.  Trazodone added.    Frequent falls  Assessment & Plan  History of multiple falls.  On comfort care    UTI (urinary tract infection)  Assessment & Plan  Completed antibiotic treatment.  On comfort care    NSTEMI (non-ST elevated myocardial infarction) ([facility])  Assessment & Plan  Elevated troponin with borderline ST depression in septal and lateral leads.  SUZY showed possible AV vegetation.  On comfort care    Hypothyroid- (present on admission)  Assessment & Plan  Transitioned to comfort care    CKD (chronic kidney disease) stage 3, GFR 30-59 ml/min ([facility])- (present on admission)  Assessment & Plan  On comfort care         VTE prophylaxis: Comfort care

## 2022-12-29 VITALS
HEART RATE: 72 BPM | BODY MASS INDEX: 28.7 KG/M2 | WEIGHT: 193.78 LBS | TEMPERATURE: 97.8 F | DIASTOLIC BLOOD PRESSURE: 77 MMHG | RESPIRATION RATE: 18 BRPM | HEIGHT: 69 IN | SYSTOLIC BLOOD PRESSURE: 107 MMHG | OXYGEN SATURATION: 94 %

## 2022-12-29 PROCEDURE — A9270 NON-COVERED ITEM OR SERVICE: HCPCS | Performed by: INTERNAL MEDICINE

## 2022-12-29 PROCEDURE — 99239 HOSP IP/OBS DSCHRG MGMT >30: CPT | Performed by: INTERNAL MEDICINE

## 2022-12-29 PROCEDURE — 700102 HCHG RX REV CODE 250 W/ 637 OVERRIDE(OP)

## 2022-12-29 PROCEDURE — 700102 HCHG RX REV CODE 250 W/ 637 OVERRIDE(OP): Performed by: HOSPITALIST

## 2022-12-29 PROCEDURE — A9270 NON-COVERED ITEM OR SERVICE: HCPCS

## 2022-12-29 PROCEDURE — 700102 HCHG RX REV CODE 250 W/ 637 OVERRIDE(OP): Performed by: INTERNAL MEDICINE

## 2022-12-29 PROCEDURE — A9270 NON-COVERED ITEM OR SERVICE: HCPCS | Performed by: HOSPITALIST

## 2022-12-29 RX ADMIN — HYDROXYZINE HYDROCHLORIDE 25 MG: 25 TABLET, FILM COATED ORAL at 00:20

## 2022-12-29 RX ADMIN — LORAZEPAM 0.5 MG: 0.5 TABLET ORAL at 00:20

## 2022-12-29 RX ADMIN — OMEPRAZOLE 20 MG: 20 CAPSULE, DELAYED RELEASE ORAL at 05:10

## 2022-12-29 RX ADMIN — Medication: at 05:12

## 2022-12-29 RX ADMIN — LEVOTHYROXINE SODIUM 75 MCG: 0.07 TABLET ORAL at 05:10

## 2022-12-29 ASSESSMENT — PAIN DESCRIPTION - PAIN TYPE: TYPE: ACUTE PAIN

## 2022-12-29 NOTE — CARE PLAN
The patient is Stable - Low risk of patient condition declining or worsening    Shift Goals  Clinical Goals: comfort, safety  Patient Goals: rest  Family Goals: NA    Progress made toward(s) clinical / shift goals:      Problem: Psychosocial - Comfort Care  Goal: Patient's level of anxiety will decrease  Outcome: Progressing     Problem: Discharge Planning - Comfort Care  Goal: Patient's continuum of care needs are met  Outcome: Progressing    Patient is not progressing towards the following goals:

## 2022-12-29 NOTE — DISCHARGE PLANNING
Agency/Facility Name: [facility]   Spoke To: Lexy   Outcome: Per Lexy, pt's transportation is confirmed for 1430 today via [facility].     RN BARDY Bacon notified.

## 2022-12-29 NOTE — CARE PLAN
Problem: Knowledge Deficit - Comfort Care  Goal: Patient and family/care givers will demonstrate understanding of dying process and grieving  Outcome: Progressing     Problem: Psychosocial - Comfort Care  Goal: Spiritual and cultural needs incorporated into hospitalization  Outcome: Progressing  Goal: Patient's level of anxiety will decrease  Outcome: Progressing  Goal: Patient and family will demonstrate ability to cope with life altering diagnosis and/or procedure  Outcome: Progressing  Goal: Privacy will be maintained for patient and family  Outcome: Progressing     Problem: Discharge Planning - Comfort Care  Goal: Patient's continuum of care needs are met  Outcome: Progressing     Problem: Respiratory - Comfort Care  Goal: Patient's respiratory function will be supported  Outcome: Progressing   The patient is Stable - Low risk of patient condition declining or worsening    Shift Goals  Clinical Goals: comfort, safety  Patient Goals: rest  Family Goals: NA    Progress made toward(s) clinical / shift goals:  pt will DC today to [facility] and hospice care.

## 2022-12-29 NOTE — PROGRESS NOTES
Attempted to give report to León Martinez for this patient and was told to call Piper sosa for report. She didn't answer so a message was left for her to call me back for report.

## 2023-01-04 NOTE — DISCHARGE PLANNING
Thania Mondragon called today wanting to know how her uncle was doing. CM confirmed relation with Thania. She stated she had not been contacted by any staff. CM stated phone number listed for her [phone]. She stated that number is the patient's home number and she does not answer that phone. BRADY instructed Thania that even [employer] went to the home address listed on the facesheet to do welfare check on her, but no answer. She stated that she must not have been home. BRADY informed Thania that she could call [facility] to inquire about patient. CM then updated Thania's number on facesheet for future reference.  Phone call ended.

## 2024-10-23 ENCOUNTER — APPOINTMENT (OUTPATIENT)
Dept: RADIOLOGY | Facility: MEDICAL CENTER | Age: 84
End: 2024-10-23
Attending: STUDENT IN AN ORGANIZED HEALTH CARE EDUCATION/TRAINING PROGRAM
Payer: MEDICARE

## 2024-10-23 ENCOUNTER — APPOINTMENT (OUTPATIENT)
Dept: RADIOLOGY | Facility: MEDICAL CENTER | Age: 84
End: 2024-10-23
Attending: EMERGENCY MEDICINE
Payer: MEDICARE

## 2024-10-23 ENCOUNTER — HOSPITAL ENCOUNTER (INPATIENT)
Facility: MEDICAL CENTER | Age: 84
LOS: 6 days | End: 2024-10-29
Attending: STUDENT IN AN ORGANIZED HEALTH CARE EDUCATION/TRAINING PROGRAM | Admitting: STUDENT IN AN ORGANIZED HEALTH CARE EDUCATION/TRAINING PROGRAM
Payer: MEDICARE

## 2024-10-23 DIAGNOSIS — I63.9 CEREBROVASCULAR ACCIDENT (CVA), UNSPECIFIED MECHANISM (HCC): ICD-10-CM

## 2024-10-23 DIAGNOSIS — R29.6 FREQUENT FALLS: ICD-10-CM

## 2024-10-23 DIAGNOSIS — J18.9 PNEUMONIA OF BOTH LOWER LOBES DUE TO INFECTIOUS ORGANISM: ICD-10-CM

## 2024-10-23 DIAGNOSIS — A41.9 SEVERE SEPSIS (HCC): ICD-10-CM

## 2024-10-23 DIAGNOSIS — R65.20 SEVERE SEPSIS (HCC): ICD-10-CM

## 2024-10-23 LAB
ALBUMIN SERPL BCP-MCNC: 3.8 G/DL (ref 3.2–4.9)
ALBUMIN/GLOB SERPL: 1.3 G/DL
ALP SERPL-CCNC: 230 U/L (ref 30–99)
ALT SERPL-CCNC: 215 U/L (ref 2–50)
ANION GAP SERPL CALC-SCNC: 11 MMOL/L (ref 7–16)
APPEARANCE UR: CLEAR
AST SERPL-CCNC: 223 U/L (ref 12–45)
BACTERIA #/AREA URNS HPF: NORMAL /HPF
BASOPHILS # BLD AUTO: 0.2 % (ref 0–1.8)
BASOPHILS # BLD: 0.01 K/UL (ref 0–0.12)
BILIRUB SERPL-MCNC: 3.7 MG/DL (ref 0.1–1.5)
BILIRUB UR QL STRIP.AUTO: ABNORMAL
BUN SERPL-MCNC: 25 MG/DL (ref 8–22)
CALCIUM ALBUM COR SERPL-MCNC: 8.8 MG/DL (ref 8.5–10.5)
CALCIUM SERPL-MCNC: 8.6 MG/DL (ref 8.5–10.5)
CASTS URNS QL MICRO: NORMAL /LPF (ref 0–2)
CHLORIDE SERPL-SCNC: 105 MMOL/L (ref 96–112)
CO2 SERPL-SCNC: 22 MMOL/L (ref 20–33)
COLOR UR: ABNORMAL
CREAT SERPL-MCNC: 2.2 MG/DL (ref 0.5–1.4)
EOSINOPHIL # BLD AUTO: 0.02 K/UL (ref 0–0.51)
EOSINOPHIL NFR BLD: 0.3 % (ref 0–6.9)
EPITHELIAL CELLS 1715: NORMAL /HPF (ref 0–5)
ERYTHROCYTE [DISTWIDTH] IN BLOOD BY AUTOMATED COUNT: 56.2 FL (ref 35.9–50)
FLUAV RNA SPEC QL NAA+PROBE: NEGATIVE
FLUBV RNA SPEC QL NAA+PROBE: NEGATIVE
GFR SERPLBLD CREATININE-BSD FMLA CKD-EPI: 29 ML/MIN/1.73 M 2
GLOBULIN SER CALC-MCNC: 3 G/DL (ref 1.9–3.5)
GLUCOSE SERPL-MCNC: 126 MG/DL (ref 65–99)
GLUCOSE UR STRIP.AUTO-MCNC: NEGATIVE MG/DL
HCT VFR BLD AUTO: 35.6 % (ref 42–52)
HGB BLD-MCNC: 12 G/DL (ref 14–18)
IMM GRANULOCYTES # BLD AUTO: 0.02 K/UL (ref 0–0.11)
IMM GRANULOCYTES NFR BLD AUTO: 0.3 % (ref 0–0.9)
KETONES UR STRIP.AUTO-MCNC: ABNORMAL MG/DL
LACTATE SERPL-SCNC: 1.6 MMOL/L (ref 0.5–2)
LACTATE SERPL-SCNC: 2.4 MMOL/L (ref 0.5–2)
LEUKOCYTE ESTERASE UR QL STRIP.AUTO: ABNORMAL
LYMPHOCYTES # BLD AUTO: 0.79 K/UL (ref 1–4.8)
LYMPHOCYTES NFR BLD: 12 % (ref 22–41)
MCH RBC QN AUTO: 33.1 PG (ref 27–33)
MCHC RBC AUTO-ENTMCNC: 33.7 G/DL (ref 32.3–36.5)
MCV RBC AUTO: 98.1 FL (ref 81.4–97.8)
MICRO URNS: ABNORMAL
MONOCYTES # BLD AUTO: 0.61 K/UL (ref 0–0.85)
MONOCYTES NFR BLD AUTO: 9.3 % (ref 0–13.4)
NEUTROPHILS # BLD AUTO: 5.14 K/UL (ref 1.82–7.42)
NEUTROPHILS NFR BLD: 77.9 % (ref 44–72)
NRBC # BLD AUTO: 0 K/UL
NRBC BLD-RTO: 0 /100 WBC (ref 0–0.2)
PH UR STRIP.AUTO: 7 [PH] (ref 5–8)
PLATELET # BLD AUTO: 102 K/UL (ref 164–446)
PMV BLD AUTO: 10.5 FL (ref 9–12.9)
POTASSIUM SERPL-SCNC: 4.6 MMOL/L (ref 3.6–5.5)
PROT SERPL-MCNC: 6.8 G/DL (ref 6–8.2)
PROT UR QL STRIP: NEGATIVE MG/DL
RBC # BLD AUTO: 3.63 M/UL (ref 4.7–6.1)
RBC # URNS HPF: NORMAL /HPF (ref 0–2)
RBC UR QL AUTO: NEGATIVE
RSV RNA SPEC QL NAA+PROBE: NEGATIVE
SARS-COV-2 RNA RESP QL NAA+PROBE: NOTDETECTED
SODIUM SERPL-SCNC: 138 MMOL/L (ref 135–145)
SP GR UR STRIP.AUTO: 1.02
UROBILINOGEN UR STRIP.AUTO-MCNC: 2 EU/DL
WBC # BLD AUTO: 6.6 K/UL (ref 4.8–10.8)
WBC #/AREA URNS HPF: NORMAL /HPF

## 2024-10-23 PROCEDURE — 36415 COLL VENOUS BLD VENIPUNCTURE: CPT

## 2024-10-23 PROCEDURE — 80053 COMPREHEN METABOLIC PANEL: CPT

## 2024-10-23 PROCEDURE — 87086 URINE CULTURE/COLONY COUNT: CPT

## 2024-10-23 PROCEDURE — 96365 THER/PROPH/DIAG IV INF INIT: CPT

## 2024-10-23 PROCEDURE — 700111 HCHG RX REV CODE 636 W/ 250 OVERRIDE (IP): Mod: JZ | Performed by: STUDENT IN AN ORGANIZED HEALTH CARE EDUCATION/TRAINING PROGRAM

## 2024-10-23 PROCEDURE — 83735 ASSAY OF MAGNESIUM: CPT

## 2024-10-23 PROCEDURE — 700105 HCHG RX REV CODE 258: Performed by: STUDENT IN AN ORGANIZED HEALTH CARE EDUCATION/TRAINING PROGRAM

## 2024-10-23 PROCEDURE — 81003 URINALYSIS AUTO W/O SCOPE: CPT

## 2024-10-23 PROCEDURE — 85025 COMPLETE CBC W/AUTO DIFF WBC: CPT

## 2024-10-23 PROCEDURE — 81015 MICROSCOPIC EXAM OF URINE: CPT

## 2024-10-23 PROCEDURE — A9270 NON-COVERED ITEM OR SERVICE: HCPCS | Performed by: STUDENT IN AN ORGANIZED HEALTH CARE EDUCATION/TRAINING PROGRAM

## 2024-10-23 PROCEDURE — 770020 HCHG ROOM/CARE - TELE (206)

## 2024-10-23 PROCEDURE — 99223 1ST HOSP IP/OBS HIGH 75: CPT | Performed by: STUDENT IN AN ORGANIZED HEALTH CARE EDUCATION/TRAINING PROGRAM

## 2024-10-23 PROCEDURE — 87040 BLOOD CULTURE FOR BACTERIA: CPT | Mod: 91

## 2024-10-23 PROCEDURE — 70450 CT HEAD/BRAIN W/O DYE: CPT

## 2024-10-23 PROCEDURE — 0241U HCHG SARS-COV-2 COVID-19 NFCT DS RESP RNA 4 TRGT ED POC: CPT

## 2024-10-23 PROCEDURE — 99285 EMERGENCY DEPT VISIT HI MDM: CPT

## 2024-10-23 PROCEDURE — 700102 HCHG RX REV CODE 250 W/ 637 OVERRIDE(OP): Performed by: STUDENT IN AN ORGANIZED HEALTH CARE EDUCATION/TRAINING PROGRAM

## 2024-10-23 PROCEDURE — 71045 X-RAY EXAM CHEST 1 VIEW: CPT

## 2024-10-23 PROCEDURE — 83605 ASSAY OF LACTIC ACID: CPT | Mod: 91

## 2024-10-23 RX ORDER — DIVALPROEX SODIUM 250 MG/1
250 TABLET, DELAYED RELEASE ORAL 3 TIMES DAILY
COMMUNITY

## 2024-10-23 RX ORDER — ACETAMINOPHEN 500 MG
500 TABLET ORAL EVERY 6 HOURS PRN
COMMUNITY

## 2024-10-23 RX ORDER — ACETAMINOPHEN 500 MG
1000 TABLET ORAL ONCE
Status: COMPLETED | OUTPATIENT
Start: 2024-10-23 | End: 2024-10-23

## 2024-10-23 RX ORDER — AZITHROMYCIN 250 MG/1
500 TABLET, FILM COATED ORAL ONCE
Status: COMPLETED | OUTPATIENT
Start: 2024-10-23 | End: 2024-10-23

## 2024-10-23 RX ORDER — POLYETHYLENE GLYCOL 3350 17 G/17G
1 POWDER, FOR SOLUTION ORAL
Status: DISCONTINUED | OUTPATIENT
Start: 2024-10-23 | End: 2024-10-29 | Stop reason: HOSPADM

## 2024-10-23 RX ORDER — ACETAMINOPHEN 325 MG/1
650 TABLET ORAL EVERY 6 HOURS PRN
Status: DISCONTINUED | OUTPATIENT
Start: 2024-10-23 | End: 2024-10-29 | Stop reason: HOSPADM

## 2024-10-23 RX ORDER — PHENOL 1.4 %
10 AEROSOL, SPRAY (ML) MUCOUS MEMBRANE
COMMUNITY

## 2024-10-23 RX ORDER — AZITHROMYCIN 250 MG/1
500 TABLET, FILM COATED ORAL DAILY
Status: DISCONTINUED | OUTPATIENT
Start: 2024-10-24 | End: 2024-10-25

## 2024-10-23 RX ORDER — ENOXAPARIN SODIUM 100 MG/ML
30 INJECTION SUBCUTANEOUS DAILY
Status: DISCONTINUED | OUTPATIENT
Start: 2024-10-24 | End: 2024-10-26

## 2024-10-23 RX ORDER — AMOXICILLIN 250 MG
2 CAPSULE ORAL EVERY EVENING
Status: DISCONTINUED | OUTPATIENT
Start: 2024-10-24 | End: 2024-10-29 | Stop reason: HOSPADM

## 2024-10-23 RX ORDER — SODIUM CHLORIDE 9 MG/ML
INJECTION, SOLUTION INTRAVENOUS ONCE
Status: COMPLETED | OUTPATIENT
Start: 2024-10-23 | End: 2024-10-23

## 2024-10-23 RX ORDER — SODIUM CHLORIDE, SODIUM LACTATE, POTASSIUM CHLORIDE, CALCIUM CHLORIDE 600; 310; 30; 20 MG/100ML; MG/100ML; MG/100ML; MG/100ML
INJECTION, SOLUTION INTRAVENOUS CONTINUOUS
Status: DISCONTINUED | OUTPATIENT
Start: 2024-10-24 | End: 2024-10-25

## 2024-10-23 RX ADMIN — SODIUM CHLORIDE: 9 INJECTION, SOLUTION INTRAVENOUS at 23:30

## 2024-10-23 RX ADMIN — ACETAMINOPHEN 1000 MG: 500 TABLET ORAL at 21:30

## 2024-10-23 RX ADMIN — AMPICILLIN AND SULBACTAM 3 G: 1; 2 INJECTION, POWDER, FOR SOLUTION INTRAMUSCULAR; INTRAVENOUS at 23:08

## 2024-10-23 RX ADMIN — AZITHROMYCIN DIHYDRATE 500 MG: 250 TABLET ORAL at 23:09

## 2024-10-23 ASSESSMENT — FIBROSIS 4 INDEX: FIB4 SCORE: 5.32

## 2024-10-24 ENCOUNTER — APPOINTMENT (OUTPATIENT)
Dept: RADIOLOGY | Facility: MEDICAL CENTER | Age: 84
End: 2024-10-24
Attending: STUDENT IN AN ORGANIZED HEALTH CARE EDUCATION/TRAINING PROGRAM
Payer: MEDICARE

## 2024-10-24 PROBLEM — R13.10 DYSPHAGIA: Status: ACTIVE | Noted: 2024-10-24

## 2024-10-24 PROBLEM — J18.9 COMMUNITY ACQUIRED BILATERAL LOWER LOBE PNEUMONIA: Status: RESOLVED | Noted: 2024-10-23 | Resolved: 2024-10-24

## 2024-10-24 PROBLEM — N17.9 AKI (ACUTE KIDNEY INJURY) (HCC): Status: ACTIVE | Noted: 2024-10-24

## 2024-10-24 PROBLEM — J18.9 SEPSIS DUE TO PNEUMONIA (HCC): Status: ACTIVE | Noted: 2022-12-05

## 2024-10-24 PROBLEM — R79.89 ELEVATED LFTS: Status: ACTIVE | Noted: 2024-10-24

## 2024-10-24 PROBLEM — K81.0 ACUTE CHOLECYSTITIS: Status: ACTIVE | Noted: 2024-10-24

## 2024-10-24 PROBLEM — E87.20 LACTIC ACIDOSIS: Status: ACTIVE | Noted: 2024-10-24

## 2024-10-24 LAB
ALBUMIN SERPL BCP-MCNC: 3.3 G/DL (ref 3.2–4.9)
ALBUMIN/GLOB SERPL: 1.4 G/DL
ALP SERPL-CCNC: 207 U/L (ref 30–99)
ALT SERPL-CCNC: 170 U/L (ref 2–50)
ANION GAP SERPL CALC-SCNC: 10 MMOL/L (ref 7–16)
AST SERPL-CCNC: 157 U/L (ref 12–45)
BASOPHILS # BLD AUTO: 0.2 % (ref 0–1.8)
BASOPHILS # BLD: 0.01 K/UL (ref 0–0.12)
BILIRUB SERPL-MCNC: 3.4 MG/DL (ref 0.1–1.5)
BUN SERPL-MCNC: 24 MG/DL (ref 8–22)
CALCIUM ALBUM COR SERPL-MCNC: 8.8 MG/DL (ref 8.5–10.5)
CALCIUM SERPL-MCNC: 8.2 MG/DL (ref 8.5–10.5)
CHLORIDE SERPL-SCNC: 108 MMOL/L (ref 96–112)
CO2 SERPL-SCNC: 20 MMOL/L (ref 20–33)
CREAT SERPL-MCNC: 1.92 MG/DL (ref 0.5–1.4)
CRP SERPL HS-MCNC: 3.6 MG/DL (ref 0–0.75)
EOSINOPHIL # BLD AUTO: 0.02 K/UL (ref 0–0.51)
EOSINOPHIL NFR BLD: 0.3 % (ref 0–6.9)
ERYTHROCYTE [DISTWIDTH] IN BLOOD BY AUTOMATED COUNT: 55.2 FL (ref 35.9–50)
GFR SERPLBLD CREATININE-BSD FMLA CKD-EPI: 34 ML/MIN/1.73 M 2
GLOBULIN SER CALC-MCNC: 2.4 G/DL (ref 1.9–3.5)
GLUCOSE SERPL-MCNC: 121 MG/DL (ref 65–99)
HCT VFR BLD AUTO: 33.8 % (ref 42–52)
HGB BLD-MCNC: 11.4 G/DL (ref 14–18)
IMM GRANULOCYTES # BLD AUTO: 0.02 K/UL (ref 0–0.11)
IMM GRANULOCYTES NFR BLD AUTO: 0.3 % (ref 0–0.9)
LYMPHOCYTES # BLD AUTO: 1.08 K/UL (ref 1–4.8)
LYMPHOCYTES NFR BLD: 16.6 % (ref 22–41)
MAGNESIUM SERPL-MCNC: 2 MG/DL (ref 1.5–2.5)
MCH RBC QN AUTO: 32.9 PG (ref 27–33)
MCHC RBC AUTO-ENTMCNC: 33.7 G/DL (ref 32.3–36.5)
MCV RBC AUTO: 97.7 FL (ref 81.4–97.8)
MONOCYTES # BLD AUTO: 0.58 K/UL (ref 0–0.85)
MONOCYTES NFR BLD AUTO: 8.9 % (ref 0–13.4)
NEUTROPHILS # BLD AUTO: 4.79 K/UL (ref 1.82–7.42)
NEUTROPHILS NFR BLD: 73.7 % (ref 44–72)
NRBC # BLD AUTO: 0 K/UL
NRBC BLD-RTO: 0 /100 WBC (ref 0–0.2)
PLATELET # BLD AUTO: 90 K/UL (ref 164–446)
PLATELETS.RETICULATED NFR BLD AUTO: 3.2 % (ref 0.6–13.1)
PMV BLD AUTO: 10.1 FL (ref 9–12.9)
POTASSIUM SERPL-SCNC: 4.3 MMOL/L (ref 3.6–5.5)
PROCALCITONIN SERPL-MCNC: 0.51 NG/ML
PROT SERPL-MCNC: 5.7 G/DL (ref 6–8.2)
RBC # BLD AUTO: 3.46 M/UL (ref 4.7–6.1)
SODIUM SERPL-SCNC: 138 MMOL/L (ref 135–145)
WBC # BLD AUTO: 6.5 K/UL (ref 4.8–10.8)

## 2024-10-24 PROCEDURE — 700111 HCHG RX REV CODE 636 W/ 250 OVERRIDE (IP): Mod: JZ

## 2024-10-24 PROCEDURE — 92610 EVALUATE SWALLOWING FUNCTION: CPT

## 2024-10-24 PROCEDURE — 36415 COLL VENOUS BLD VENIPUNCTURE: CPT

## 2024-10-24 PROCEDURE — 85025 COMPLETE CBC W/AUTO DIFF WBC: CPT

## 2024-10-24 PROCEDURE — 51798 US URINE CAPACITY MEASURE: CPT

## 2024-10-24 PROCEDURE — 700105 HCHG RX REV CODE 258

## 2024-10-24 PROCEDURE — 76705 ECHO EXAM OF ABDOMEN: CPT

## 2024-10-24 PROCEDURE — 84145 PROCALCITONIN (PCT): CPT

## 2024-10-24 PROCEDURE — 99233 SBSQ HOSP IP/OBS HIGH 50: CPT | Mod: GC | Performed by: INTERNAL MEDICINE

## 2024-10-24 PROCEDURE — 85055 RETICULATED PLATELET ASSAY: CPT

## 2024-10-24 PROCEDURE — 99222 1ST HOSP IP/OBS MODERATE 55: CPT | Performed by: SURGERY

## 2024-10-24 PROCEDURE — 92612 ENDOSCOPY SWALLOW (FEES) VID: CPT

## 2024-10-24 PROCEDURE — 700102 HCHG RX REV CODE 250 W/ 637 OVERRIDE(OP)

## 2024-10-24 PROCEDURE — 86140 C-REACTIVE PROTEIN: CPT

## 2024-10-24 PROCEDURE — 80053 COMPREHEN METABOLIC PANEL: CPT

## 2024-10-24 PROCEDURE — 770020 HCHG ROOM/CARE - TELE (206)

## 2024-10-24 PROCEDURE — A9270 NON-COVERED ITEM OR SERVICE: HCPCS

## 2024-10-24 RX ORDER — LORAZEPAM 2 MG/1
2 TABLET ORAL 2 TIMES DAILY
Status: DISCONTINUED | OUTPATIENT
Start: 2024-10-24 | End: 2024-10-29 | Stop reason: HOSPADM

## 2024-10-24 RX ORDER — BENAZEPRIL HYDROCHLORIDE 20 MG/1
10 TABLET ORAL NIGHTLY
Status: DISCONTINUED | OUTPATIENT
Start: 2024-10-24 | End: 2024-10-29 | Stop reason: HOSPADM

## 2024-10-24 RX ORDER — VALPROIC ACID 250 MG/5ML
125 SOLUTION ORAL 3 TIMES DAILY
Status: DISCONTINUED | OUTPATIENT
Start: 2024-10-24 | End: 2024-10-29 | Stop reason: HOSPADM

## 2024-10-24 RX ORDER — QUETIAPINE FUMARATE 100 MG/1
200 TABLET, FILM COATED ORAL EVERY EVENING
Status: DISCONTINUED | OUTPATIENT
Start: 2024-10-24 | End: 2024-10-29 | Stop reason: HOSPADM

## 2024-10-24 RX ORDER — DIVALPROEX SODIUM 125 MG/1
125 CAPSULE, COATED PELLETS ORAL 3 TIMES DAILY
Status: DISCONTINUED | OUTPATIENT
Start: 2024-10-24 | End: 2024-10-24

## 2024-10-24 RX ORDER — TAMSULOSIN HYDROCHLORIDE 0.4 MG/1
0.4 CAPSULE ORAL DAILY
Status: DISCONTINUED | OUTPATIENT
Start: 2024-10-24 | End: 2024-10-29 | Stop reason: HOSPADM

## 2024-10-24 RX ORDER — HYDROXYZINE HYDROCHLORIDE 25 MG/1
25 TABLET, FILM COATED ORAL 3 TIMES DAILY PRN
Status: DISCONTINUED | OUTPATIENT
Start: 2024-10-24 | End: 2024-10-29 | Stop reason: HOSPADM

## 2024-10-24 RX ORDER — LEVOTHYROXINE SODIUM 88 UG/1
88 TABLET ORAL
Status: DISCONTINUED | OUTPATIENT
Start: 2024-10-24 | End: 2024-10-29 | Stop reason: HOSPADM

## 2024-10-24 RX ADMIN — SODIUM CHLORIDE, POTASSIUM CHLORIDE, SODIUM LACTATE AND CALCIUM CHLORIDE: 600; 310; 30; 20 INJECTION, SOLUTION INTRAVENOUS at 17:09

## 2024-10-24 RX ADMIN — SENNOSIDES AND DOCUSATE SODIUM 2 TABLET: 50; 8.6 TABLET ORAL at 17:03

## 2024-10-24 RX ADMIN — QUETIAPINE FUMARATE 200 MG: 100 TABLET ORAL at 17:03

## 2024-10-24 RX ADMIN — AZITHROMYCIN DIHYDRATE 500 MG: 250 TABLET, FILM COATED ORAL at 00:35

## 2024-10-24 RX ADMIN — AMPICILLIN AND SULBACTAM 3 G: 1; 2 INJECTION, POWDER, FOR SOLUTION INTRAMUSCULAR; INTRAVENOUS at 12:44

## 2024-10-24 RX ADMIN — SODIUM CHLORIDE, POTASSIUM CHLORIDE, SODIUM LACTATE AND CALCIUM CHLORIDE: 600; 310; 30; 20 INJECTION, SOLUTION INTRAVENOUS at 00:41

## 2024-10-24 RX ADMIN — LORAZEPAM 2 MG: 2 TABLET ORAL at 17:03

## 2024-10-24 RX ADMIN — OMEPRAZOLE 40 MG: 20 CAPSULE, DELAYED RELEASE ORAL at 05:07

## 2024-10-24 RX ADMIN — HYDROXYZINE HYDROCHLORIDE 25 MG: 25 TABLET, FILM COATED ORAL at 14:48

## 2024-10-24 RX ADMIN — LEVOTHYROXINE SODIUM 88 MCG: 0.09 TABLET ORAL at 05:07

## 2024-10-24 RX ADMIN — ENOXAPARIN SODIUM 30 MG: 100 INJECTION SUBCUTANEOUS at 17:03

## 2024-10-24 RX ADMIN — AMPICILLIN AND SULBACTAM 3 G: 1; 2 INJECTION, POWDER, FOR SOLUTION INTRAMUSCULAR; INTRAVENOUS at 00:56

## 2024-10-24 RX ADMIN — ACETAMINOPHEN 650 MG: 325 TABLET ORAL at 14:49

## 2024-10-24 RX ADMIN — SODIUM CHLORIDE, POTASSIUM CHLORIDE, SODIUM LACTATE AND CALCIUM CHLORIDE: 600; 310; 30; 20 INJECTION, SOLUTION INTRAVENOUS at 09:21

## 2024-10-24 RX ADMIN — VALPROIC ACID 125 MG: 250 SOLUTION ORAL at 17:03

## 2024-10-24 RX ADMIN — VALPROIC ACID 125 MG: 250 SOLUTION ORAL at 11:49

## 2024-10-24 RX ADMIN — TAMSULOSIN HYDROCHLORIDE 0.4 MG: 0.4 CAPSULE ORAL at 05:07

## 2024-10-24 RX ADMIN — VALPROIC ACID 125 MG: 250 SOLUTION ORAL at 05:07

## 2024-10-24 RX ADMIN — LORAZEPAM 2 MG: 2 TABLET ORAL at 05:07

## 2024-10-24 ASSESSMENT — ENCOUNTER SYMPTOMS
EYES NEGATIVE: 1
RESPIRATORY NEGATIVE: 1
WEIGHT LOSS: 0
NEUROLOGICAL NEGATIVE: 1
MUSCULOSKELETAL NEGATIVE: 1
FEVER: 0
GASTROINTESTINAL NEGATIVE: 1
CARDIOVASCULAR NEGATIVE: 1
CHILLS: 1

## 2024-10-24 ASSESSMENT — PAIN DESCRIPTION - PAIN TYPE
TYPE: ACUTE PAIN;CHRONIC PAIN

## 2024-10-24 ASSESSMENT — FIBROSIS 4 INDEX
FIB4 SCORE: 11.24
FIB4 SCORE: 12.52

## 2024-10-25 ENCOUNTER — APPOINTMENT (OUTPATIENT)
Dept: RADIOLOGY | Facility: MEDICAL CENTER | Age: 84
End: 2024-10-25
Payer: MEDICARE

## 2024-10-25 PROBLEM — E87.20 LACTIC ACIDOSIS: Status: RESOLVED | Noted: 2024-10-24 | Resolved: 2024-10-25

## 2024-10-25 LAB
ALBUMIN SERPL BCP-MCNC: 3.1 G/DL (ref 3.2–4.9)
ALBUMIN/GLOB SERPL: 1.2 G/DL
ALP SERPL-CCNC: 211 U/L (ref 30–99)
ALT SERPL-CCNC: 121 U/L (ref 2–50)
ANION GAP SERPL CALC-SCNC: 13 MMOL/L (ref 7–16)
AST SERPL-CCNC: 82 U/L (ref 12–45)
BILIRUB SERPL-MCNC: 2.3 MG/DL (ref 0.1–1.5)
BUN SERPL-MCNC: 21 MG/DL (ref 8–22)
CALCIUM ALBUM COR SERPL-MCNC: 8.7 MG/DL (ref 8.5–10.5)
CALCIUM SERPL-MCNC: 8 MG/DL (ref 8.5–10.5)
CHLORIDE SERPL-SCNC: 106 MMOL/L (ref 96–112)
CO2 SERPL-SCNC: 20 MMOL/L (ref 20–33)
CREAT SERPL-MCNC: 1.72 MG/DL (ref 0.5–1.4)
ERYTHROCYTE [DISTWIDTH] IN BLOOD BY AUTOMATED COUNT: 56.7 FL (ref 35.9–50)
GFR SERPLBLD CREATININE-BSD FMLA CKD-EPI: 39 ML/MIN/1.73 M 2
GLOBULIN SER CALC-MCNC: 2.6 G/DL (ref 1.9–3.5)
GLUCOSE SERPL-MCNC: 107 MG/DL (ref 65–99)
HCT VFR BLD AUTO: 35 % (ref 42–52)
HGB BLD-MCNC: 11.8 G/DL (ref 14–18)
MCH RBC QN AUTO: 33.1 PG (ref 27–33)
MCHC RBC AUTO-ENTMCNC: 33.7 G/DL (ref 32.3–36.5)
MCV RBC AUTO: 98 FL (ref 81.4–97.8)
PLATELET # BLD AUTO: 63 K/UL (ref 164–446)
PLATELETS.RETICULATED NFR BLD AUTO: 6.4 % (ref 0.6–13.1)
PMV BLD AUTO: 11.1 FL (ref 9–12.9)
POTASSIUM SERPL-SCNC: 4.2 MMOL/L (ref 3.6–5.5)
PROT SERPL-MCNC: 5.7 G/DL (ref 6–8.2)
RBC # BLD AUTO: 3.57 M/UL (ref 4.7–6.1)
SODIUM SERPL-SCNC: 139 MMOL/L (ref 135–145)
WBC # BLD AUTO: 4.9 K/UL (ref 4.8–10.8)

## 2024-10-25 PROCEDURE — 700111 HCHG RX REV CODE 636 W/ 250 OVERRIDE (IP): Mod: JZ

## 2024-10-25 PROCEDURE — A9270 NON-COVERED ITEM OR SERVICE: HCPCS

## 2024-10-25 PROCEDURE — 85027 COMPLETE CBC AUTOMATED: CPT

## 2024-10-25 PROCEDURE — 99232 SBSQ HOSP IP/OBS MODERATE 35: CPT | Performed by: SURGERY

## 2024-10-25 PROCEDURE — A9537 TC99M MEBROFENIN: HCPCS

## 2024-10-25 PROCEDURE — 99232 SBSQ HOSP IP/OBS MODERATE 35: CPT | Mod: GC | Performed by: INTERNAL MEDICINE

## 2024-10-25 PROCEDURE — 700102 HCHG RX REV CODE 250 W/ 637 OVERRIDE(OP)

## 2024-10-25 PROCEDURE — 36415 COLL VENOUS BLD VENIPUNCTURE: CPT

## 2024-10-25 PROCEDURE — 80053 COMPREHEN METABOLIC PANEL: CPT

## 2024-10-25 PROCEDURE — 700105 HCHG RX REV CODE 258

## 2024-10-25 PROCEDURE — 770001 HCHG ROOM/CARE - MED/SURG/GYN PRIV*

## 2024-10-25 PROCEDURE — 85055 RETICULATED PLATELET ASSAY: CPT

## 2024-10-25 RX ORDER — MORPHINE SULFATE 4 MG/ML
3 INJECTION INTRAVENOUS ONCE
Status: COMPLETED | OUTPATIENT
Start: 2024-10-25 | End: 2024-10-25

## 2024-10-25 RX ORDER — MORPHINE SULFATE 4 MG/ML
INJECTION INTRAVENOUS
Status: COMPLETED
Start: 2024-10-25 | End: 2024-10-25

## 2024-10-25 RX ADMIN — VALPROIC ACID 125 MG: 250 SOLUTION ORAL at 04:45

## 2024-10-25 RX ADMIN — MORPHINE SULFATE 3 MG: 4 INJECTION INTRAVENOUS at 09:43

## 2024-10-25 RX ADMIN — ACETAMINOPHEN 650 MG: 325 TABLET ORAL at 08:19

## 2024-10-25 RX ADMIN — Medication 10 MG: at 20:28

## 2024-10-25 RX ADMIN — OMEPRAZOLE 40 MG: 20 CAPSULE, DELAYED RELEASE ORAL at 04:40

## 2024-10-25 RX ADMIN — LEVOTHYROXINE SODIUM 88 MCG: 0.09 TABLET ORAL at 04:40

## 2024-10-25 RX ADMIN — SODIUM CHLORIDE, POTASSIUM CHLORIDE, SODIUM LACTATE AND CALCIUM CHLORIDE: 600; 310; 30; 20 INJECTION, SOLUTION INTRAVENOUS at 02:02

## 2024-10-25 RX ADMIN — LORAZEPAM 2 MG: 2 TABLET ORAL at 17:12

## 2024-10-25 RX ADMIN — AZITHROMYCIN DIHYDRATE 500 MG: 250 TABLET, FILM COATED ORAL at 04:40

## 2024-10-25 RX ADMIN — VALPROIC ACID 125 MG: 250 SOLUTION ORAL at 17:12

## 2024-10-25 RX ADMIN — AMPICILLIN AND SULBACTAM 3 G: 1; 2 INJECTION, POWDER, FOR SOLUTION INTRAMUSCULAR; INTRAVENOUS at 01:12

## 2024-10-25 RX ADMIN — VALPROIC ACID 125 MG: 250 SOLUTION ORAL at 13:13

## 2024-10-25 RX ADMIN — MORPHINE SULFATE 3 MG: 4 INJECTION, SOLUTION INTRAMUSCULAR; INTRAVENOUS at 09:43

## 2024-10-25 RX ADMIN — LORAZEPAM 2 MG: 2 TABLET ORAL at 04:40

## 2024-10-25 RX ADMIN — TAMSULOSIN HYDROCHLORIDE 0.4 MG: 0.4 CAPSULE ORAL at 04:40

## 2024-10-25 RX ADMIN — ENOXAPARIN SODIUM 30 MG: 100 INJECTION SUBCUTANEOUS at 17:13

## 2024-10-25 RX ADMIN — QUETIAPINE FUMARATE 200 MG: 100 TABLET ORAL at 17:12

## 2024-10-25 RX ADMIN — AMPICILLIN AND SULBACTAM 3 G: 1; 2 INJECTION, POWDER, FOR SOLUTION INTRAMUSCULAR; INTRAVENOUS at 13:11

## 2024-10-25 ASSESSMENT — COGNITIVE AND FUNCTIONAL STATUS - GENERAL
MOVING FROM LYING ON BACK TO SITTING ON SIDE OF FLAT BED: A LOT
DAILY ACTIVITIY SCORE: 12
CLIMB 3 TO 5 STEPS WITH RAILING: TOTAL
MOBILITY SCORE: 11
STANDING UP FROM CHAIR USING ARMS: A LOT
TOILETING: A LOT
HELP NEEDED FOR BATHING: A LOT
MOVING TO AND FROM BED TO CHAIR: A LOT
SUGGESTED CMS G CODE MODIFIER DAILY ACTIVITY: CL
DRESSING REGULAR UPPER BODY CLOTHING: A LOT
DRESSING REGULAR LOWER BODY CLOTHING: A LOT
PERSONAL GROOMING: A LOT
TURNING FROM BACK TO SIDE WHILE IN FLAT BAD: A LOT
WALKING IN HOSPITAL ROOM: A LOT
SUGGESTED CMS G CODE MODIFIER MOBILITY: CL
EATING MEALS: A LOT

## 2024-10-25 ASSESSMENT — PATIENT HEALTH QUESTIONNAIRE - PHQ9
1. LITTLE INTEREST OR PLEASURE IN DOING THINGS: NOT AT ALL
2. FEELING DOWN, DEPRESSED, IRRITABLE, OR HOPELESS: NOT AT ALL
SUM OF ALL RESPONSES TO PHQ9 QUESTIONS 1 AND 2: 0

## 2024-10-25 ASSESSMENT — PAIN SCALES - WONG BAKER: WONGBAKER_NUMERICALRESPONSE: HURTS JUST A LITTLE BIT

## 2024-10-25 ASSESSMENT — FIBROSIS 4 INDEX: FIB4 SCORE: 11.24

## 2024-10-25 ASSESSMENT — PAIN DESCRIPTION - PAIN TYPE
TYPE: ACUTE PAIN
TYPE: CHRONIC PAIN

## 2024-10-26 ENCOUNTER — APPOINTMENT (OUTPATIENT)
Dept: RADIOLOGY | Facility: MEDICAL CENTER | Age: 84
End: 2024-10-26
Payer: MEDICARE

## 2024-10-26 PROBLEM — J18.9 PNEUMONIA: Status: ACTIVE | Noted: 2024-10-26

## 2024-10-26 LAB
ALBUMIN SERPL BCP-MCNC: 3.1 G/DL (ref 3.2–4.9)
ALBUMIN/GLOB SERPL: 1 G/DL
ALP SERPL-CCNC: 265 U/L (ref 30–99)
ALT SERPL-CCNC: 112 U/L (ref 2–50)
ANION GAP SERPL CALC-SCNC: 10 MMOL/L (ref 7–16)
AST SERPL-CCNC: 83 U/L (ref 12–45)
BACTERIA UR CULT: NORMAL
BILIRUB SERPL-MCNC: 1.8 MG/DL (ref 0.1–1.5)
BUN SERPL-MCNC: 25 MG/DL (ref 8–22)
CALCIUM ALBUM COR SERPL-MCNC: 9.1 MG/DL (ref 8.5–10.5)
CALCIUM SERPL-MCNC: 8.4 MG/DL (ref 8.5–10.5)
CHLORIDE SERPL-SCNC: 106 MMOL/L (ref 96–112)
CO2 SERPL-SCNC: 21 MMOL/L (ref 20–33)
CREAT SERPL-MCNC: 1.8 MG/DL (ref 0.5–1.4)
GFR SERPLBLD CREATININE-BSD FMLA CKD-EPI: 37 ML/MIN/1.73 M 2
GLOBULIN SER CALC-MCNC: 3 G/DL (ref 1.9–3.5)
GLUCOSE SERPL-MCNC: 114 MG/DL (ref 65–99)
POTASSIUM SERPL-SCNC: 4.4 MMOL/L (ref 3.6–5.5)
PROT SERPL-MCNC: 6.1 G/DL (ref 6–8.2)
SIGNIFICANT IND 70042: NORMAL
SITE SITE: NORMAL
SODIUM SERPL-SCNC: 137 MMOL/L (ref 135–145)
SOURCE SOURCE: NORMAL

## 2024-10-26 PROCEDURE — 700117 HCHG RX CONTRAST REV CODE 255

## 2024-10-26 PROCEDURE — A9270 NON-COVERED ITEM OR SERVICE: HCPCS

## 2024-10-26 PROCEDURE — 80053 COMPREHEN METABOLIC PANEL: CPT

## 2024-10-26 PROCEDURE — 700111 HCHG RX REV CODE 636 W/ 250 OVERRIDE (IP): Mod: JZ

## 2024-10-26 PROCEDURE — 700105 HCHG RX REV CODE 258: Performed by: INTERNAL MEDICINE

## 2024-10-26 PROCEDURE — 99233 SBSQ HOSP IP/OBS HIGH 50: CPT | Performed by: INTERNAL MEDICINE

## 2024-10-26 PROCEDURE — 74177 CT ABD & PELVIS W/CONTRAST: CPT

## 2024-10-26 PROCEDURE — 700111 HCHG RX REV CODE 636 W/ 250 OVERRIDE (IP): Mod: JZ | Performed by: INTERNAL MEDICINE

## 2024-10-26 PROCEDURE — 36415 COLL VENOUS BLD VENIPUNCTURE: CPT

## 2024-10-26 PROCEDURE — 71275 CT ANGIOGRAPHY CHEST: CPT

## 2024-10-26 PROCEDURE — 770001 HCHG ROOM/CARE - MED/SURG/GYN PRIV*

## 2024-10-26 PROCEDURE — 700102 HCHG RX REV CODE 250 W/ 637 OVERRIDE(OP)

## 2024-10-26 PROCEDURE — 99232 SBSQ HOSP IP/OBS MODERATE 35: CPT | Performed by: SURGERY

## 2024-10-26 PROCEDURE — 700105 HCHG RX REV CODE 258

## 2024-10-26 RX ORDER — ONDANSETRON 2 MG/ML
4 INJECTION INTRAMUSCULAR; INTRAVENOUS EVERY 4 HOURS PRN
Status: DISCONTINUED | OUTPATIENT
Start: 2024-10-26 | End: 2024-10-29 | Stop reason: HOSPADM

## 2024-10-26 RX ORDER — ENOXAPARIN SODIUM 100 MG/ML
40 INJECTION SUBCUTANEOUS DAILY
Status: DISCONTINUED | OUTPATIENT
Start: 2024-10-26 | End: 2024-10-29 | Stop reason: HOSPADM

## 2024-10-26 RX ADMIN — VALPROIC ACID 125 MG: 250 SOLUTION ORAL at 08:18

## 2024-10-26 RX ADMIN — LORAZEPAM 2 MG: 2 TABLET ORAL at 17:46

## 2024-10-26 RX ADMIN — ONDANSETRON 4 MG: 2 INJECTION INTRAMUSCULAR; INTRAVENOUS at 08:18

## 2024-10-26 RX ADMIN — AMPICILLIN AND SULBACTAM 3 G: 1; 2 INJECTION, POWDER, FOR SOLUTION INTRAMUSCULAR; INTRAVENOUS at 17:51

## 2024-10-26 RX ADMIN — VALPROIC ACID 125 MG: 250 SOLUTION ORAL at 18:27

## 2024-10-26 RX ADMIN — ENOXAPARIN SODIUM 40 MG: 100 INJECTION SUBCUTANEOUS at 17:46

## 2024-10-26 RX ADMIN — OMEPRAZOLE 40 MG: 20 CAPSULE, DELAYED RELEASE ORAL at 08:18

## 2024-10-26 RX ADMIN — AMPICILLIN AND SULBACTAM 3 G: 1; 2 INJECTION, POWDER, FOR SOLUTION INTRAMUSCULAR; INTRAVENOUS at 01:14

## 2024-10-26 RX ADMIN — LEVOTHYROXINE SODIUM 88 MCG: 0.09 TABLET ORAL at 05:24

## 2024-10-26 RX ADMIN — TAMSULOSIN HYDROCHLORIDE 0.4 MG: 0.4 CAPSULE ORAL at 05:24

## 2024-10-26 RX ADMIN — VALPROIC ACID 125 MG: 250 SOLUTION ORAL at 12:28

## 2024-10-26 RX ADMIN — QUETIAPINE FUMARATE 200 MG: 100 TABLET ORAL at 17:46

## 2024-10-26 RX ADMIN — IOHEXOL 100 ML: 350 INJECTION, SOLUTION INTRAVENOUS at 15:30

## 2024-10-26 RX ADMIN — ACETAMINOPHEN 650 MG: 325 TABLET ORAL at 12:28

## 2024-10-26 RX ADMIN — AMPICILLIN AND SULBACTAM 3 G: 1; 2 INJECTION, POWDER, FOR SOLUTION INTRAMUSCULAR; INTRAVENOUS at 12:39

## 2024-10-26 RX ADMIN — Medication 10 MG: at 20:41

## 2024-10-26 RX ADMIN — LORAZEPAM 2 MG: 2 TABLET ORAL at 05:24

## 2024-10-26 ASSESSMENT — ENCOUNTER SYMPTOMS
RESPIRATORY NEGATIVE: 1
CARDIOVASCULAR NEGATIVE: 1
EYES NEGATIVE: 1
CONSTITUTIONAL NEGATIVE: 1
GASTROINTESTINAL NEGATIVE: 1
MUSCULOSKELETAL NEGATIVE: 1
NEUROLOGICAL NEGATIVE: 1

## 2024-10-26 ASSESSMENT — PAIN DESCRIPTION - PAIN TYPE: TYPE: ACUTE PAIN

## 2024-10-26 ASSESSMENT — FIBROSIS 4 INDEX: FIB4 SCORE: 9.94

## 2024-10-27 LAB
ALBUMIN SERPL BCP-MCNC: 2.7 G/DL (ref 3.2–4.9)
ALBUMIN/GLOB SERPL: 1 G/DL
ALP SERPL-CCNC: 274 U/L (ref 30–99)
ALT SERPL-CCNC: 106 U/L (ref 2–50)
ANION GAP SERPL CALC-SCNC: 9 MMOL/L (ref 7–16)
AST SERPL-CCNC: 105 U/L (ref 12–45)
BILIRUB SERPL-MCNC: 1.4 MG/DL (ref 0.1–1.5)
BUN SERPL-MCNC: 27 MG/DL (ref 8–22)
CALCIUM ALBUM COR SERPL-MCNC: 8.9 MG/DL (ref 8.5–10.5)
CALCIUM SERPL-MCNC: 7.9 MG/DL (ref 8.5–10.5)
CHLORIDE SERPL-SCNC: 106 MMOL/L (ref 96–112)
CO2 SERPL-SCNC: 23 MMOL/L (ref 20–33)
CREAT SERPL-MCNC: 1.78 MG/DL (ref 0.5–1.4)
ERYTHROCYTE [DISTWIDTH] IN BLOOD BY AUTOMATED COUNT: 55.5 FL (ref 35.9–50)
GFR SERPLBLD CREATININE-BSD FMLA CKD-EPI: 37 ML/MIN/1.73 M 2
GLOBULIN SER CALC-MCNC: 2.6 G/DL (ref 1.9–3.5)
GLUCOSE SERPL-MCNC: 135 MG/DL (ref 65–99)
HCT VFR BLD AUTO: 29.3 % (ref 42–52)
HGB BLD-MCNC: 10.2 G/DL (ref 14–18)
MCH RBC QN AUTO: 33.4 PG (ref 27–33)
MCHC RBC AUTO-ENTMCNC: 34.8 G/DL (ref 32.3–36.5)
MCV RBC AUTO: 96.1 FL (ref 81.4–97.8)
PLATELET # BLD AUTO: 75 K/UL (ref 164–446)
PLATELETS.RETICULATED NFR BLD AUTO: 3 % (ref 0.6–13.1)
PMV BLD AUTO: 10.6 FL (ref 9–12.9)
POTASSIUM SERPL-SCNC: 4.1 MMOL/L (ref 3.6–5.5)
PROT SERPL-MCNC: 5.3 G/DL (ref 6–8.2)
RBC # BLD AUTO: 3.05 M/UL (ref 4.7–6.1)
SODIUM SERPL-SCNC: 138 MMOL/L (ref 135–145)
WBC # BLD AUTO: 3.2 K/UL (ref 4.8–10.8)

## 2024-10-27 PROCEDURE — 700105 HCHG RX REV CODE 258: Performed by: INTERNAL MEDICINE

## 2024-10-27 PROCEDURE — 85027 COMPLETE CBC AUTOMATED: CPT

## 2024-10-27 PROCEDURE — 36415 COLL VENOUS BLD VENIPUNCTURE: CPT

## 2024-10-27 PROCEDURE — 700111 HCHG RX REV CODE 636 W/ 250 OVERRIDE (IP): Mod: JZ | Performed by: INTERNAL MEDICINE

## 2024-10-27 PROCEDURE — A9270 NON-COVERED ITEM OR SERVICE: HCPCS

## 2024-10-27 PROCEDURE — 700111 HCHG RX REV CODE 636 W/ 250 OVERRIDE (IP): Mod: JZ

## 2024-10-27 PROCEDURE — 97535 SELF CARE MNGMENT TRAINING: CPT

## 2024-10-27 PROCEDURE — 97163 PT EVAL HIGH COMPLEX 45 MIN: CPT

## 2024-10-27 PROCEDURE — 84155 ASSAY OF PROTEIN SERUM: CPT

## 2024-10-27 PROCEDURE — 80053 COMPREHEN METABOLIC PANEL: CPT

## 2024-10-27 PROCEDURE — 700102 HCHG RX REV CODE 250 W/ 637 OVERRIDE(OP)

## 2024-10-27 PROCEDURE — 99232 SBSQ HOSP IP/OBS MODERATE 35: CPT | Performed by: SURGERY

## 2024-10-27 PROCEDURE — 700105 HCHG RX REV CODE 258

## 2024-10-27 PROCEDURE — 84165 PROTEIN E-PHORESIS SERUM: CPT

## 2024-10-27 PROCEDURE — 85055 RETICULATED PLATELET ASSAY: CPT

## 2024-10-27 PROCEDURE — 99232 SBSQ HOSP IP/OBS MODERATE 35: CPT | Mod: GC | Performed by: INTERNAL MEDICINE

## 2024-10-27 PROCEDURE — 770001 HCHG ROOM/CARE - MED/SURG/GYN PRIV*

## 2024-10-27 RX ORDER — SODIUM CHLORIDE 9 MG/ML
INJECTION, SOLUTION INTRAVENOUS CONTINUOUS
Status: DISPENSED | OUTPATIENT
Start: 2024-10-27 | End: 2024-10-27

## 2024-10-27 RX ADMIN — SODIUM CHLORIDE: 9 INJECTION, SOLUTION INTRAVENOUS at 13:49

## 2024-10-27 RX ADMIN — AMPICILLIN AND SULBACTAM 3 G: 1; 2 INJECTION, POWDER, FOR SOLUTION INTRAMUSCULAR; INTRAVENOUS at 06:14

## 2024-10-27 RX ADMIN — LORAZEPAM 2 MG: 2 TABLET ORAL at 16:17

## 2024-10-27 RX ADMIN — LEVOTHYROXINE SODIUM 88 MCG: 0.09 TABLET ORAL at 06:08

## 2024-10-27 RX ADMIN — LORAZEPAM 2 MG: 2 TABLET ORAL at 06:08

## 2024-10-27 RX ADMIN — ENOXAPARIN SODIUM 40 MG: 100 INJECTION SUBCUTANEOUS at 16:17

## 2024-10-27 RX ADMIN — VALPROIC ACID 125 MG: 250 SOLUTION ORAL at 06:07

## 2024-10-27 RX ADMIN — AMPICILLIN AND SULBACTAM 3 G: 1; 2 INJECTION, POWDER, FOR SOLUTION INTRAMUSCULAR; INTRAVENOUS at 00:08

## 2024-10-27 RX ADMIN — OMEPRAZOLE 40 MG: 20 CAPSULE, DELAYED RELEASE ORAL at 06:08

## 2024-10-27 RX ADMIN — AMOXICILLIN AND CLAVULANATE POTASSIUM 1 TABLET: 875; 125 TABLET, FILM COATED ORAL at 13:52

## 2024-10-27 RX ADMIN — Medication 10 MG: at 20:49

## 2024-10-27 RX ADMIN — AMOXICILLIN AND CLAVULANATE POTASSIUM 1 TABLET: 875; 125 TABLET, FILM COATED ORAL at 16:17

## 2024-10-27 RX ADMIN — VALPROIC ACID 125 MG: 250 SOLUTION ORAL at 13:52

## 2024-10-27 RX ADMIN — VALPROIC ACID 125 MG: 250 SOLUTION ORAL at 16:17

## 2024-10-27 RX ADMIN — ACETAMINOPHEN 650 MG: 325 TABLET ORAL at 04:25

## 2024-10-27 RX ADMIN — TAMSULOSIN HYDROCHLORIDE 0.4 MG: 0.4 CAPSULE ORAL at 06:08

## 2024-10-27 RX ADMIN — QUETIAPINE FUMARATE 200 MG: 100 TABLET ORAL at 16:17

## 2024-10-27 ASSESSMENT — PAIN DESCRIPTION - PAIN TYPE
TYPE: ACUTE PAIN

## 2024-10-27 ASSESSMENT — COGNITIVE AND FUNCTIONAL STATUS - GENERAL
TURNING FROM BACK TO SIDE WHILE IN FLAT BAD: A LITTLE
WALKING IN HOSPITAL ROOM: A LOT
MOBILITY SCORE: 15
SUGGESTED CMS G CODE MODIFIER MOBILITY: CK
MOVING FROM LYING ON BACK TO SITTING ON SIDE OF FLAT BED: A LITTLE
STANDING UP FROM CHAIR USING ARMS: A LOT
CLIMB 3 TO 5 STEPS WITH RAILING: A LOT
MOVING TO AND FROM BED TO CHAIR: A LITTLE

## 2024-10-27 ASSESSMENT — ENCOUNTER SYMPTOMS
MUSCULOSKELETAL NEGATIVE: 1
CONSTITUTIONAL NEGATIVE: 1
CARDIOVASCULAR NEGATIVE: 1
EYES NEGATIVE: 1
GASTROINTESTINAL NEGATIVE: 1
RESPIRATORY NEGATIVE: 1
NEUROLOGICAL NEGATIVE: 1

## 2024-10-27 ASSESSMENT — GAIT ASSESSMENTS
DISTANCE (FEET): 2
ASSISTIVE DEVICE: FRONT WHEEL WALKER
DEVIATION: SHUFFLED GAIT;BRADYKINETIC;INCREASED BASE OF SUPPORT
GAIT LEVEL OF ASSIST: MINIMAL ASSIST

## 2024-10-28 LAB
ALBUMIN SERPL BCP-MCNC: 2.8 G/DL (ref 3.2–4.9)
ALBUMIN/GLOB SERPL: 1 G/DL
ALP SERPL-CCNC: 337 U/L (ref 30–99)
ALT SERPL-CCNC: 122 U/L (ref 2–50)
ANION GAP SERPL CALC-SCNC: 9 MMOL/L (ref 7–16)
AST SERPL-CCNC: 124 U/L (ref 12–45)
BACTERIA BLD CULT: NORMAL
BACTERIA BLD CULT: NORMAL
BILIRUB SERPL-MCNC: 1.8 MG/DL (ref 0.1–1.5)
BUN SERPL-MCNC: 25 MG/DL (ref 8–22)
CALCIUM ALBUM COR SERPL-MCNC: 9 MG/DL (ref 8.5–10.5)
CALCIUM SERPL-MCNC: 8 MG/DL (ref 8.5–10.5)
CHLORIDE SERPL-SCNC: 107 MMOL/L (ref 96–112)
CO2 SERPL-SCNC: 23 MMOL/L (ref 20–33)
CREAT SERPL-MCNC: 1.73 MG/DL (ref 0.5–1.4)
ERYTHROCYTE [DISTWIDTH] IN BLOOD BY AUTOMATED COUNT: 53.2 FL (ref 35.9–50)
GFR SERPLBLD CREATININE-BSD FMLA CKD-EPI: 38 ML/MIN/1.73 M 2
GLOBULIN SER CALC-MCNC: 2.7 G/DL (ref 1.9–3.5)
GLUCOSE SERPL-MCNC: 114 MG/DL (ref 65–99)
HCT VFR BLD AUTO: 28.3 % (ref 42–52)
HGB BLD-MCNC: 9.7 G/DL (ref 14–18)
MCH RBC QN AUTO: 32.4 PG (ref 27–33)
MCHC RBC AUTO-ENTMCNC: 34.3 G/DL (ref 32.3–36.5)
MCV RBC AUTO: 94.6 FL (ref 81.4–97.8)
PLATELET # BLD AUTO: 81 K/UL (ref 164–446)
PLATELETS.RETICULATED NFR BLD AUTO: 4.6 % (ref 0.6–13.1)
PMV BLD AUTO: 10.6 FL (ref 9–12.9)
POTASSIUM SERPL-SCNC: 4 MMOL/L (ref 3.6–5.5)
PROT SERPL-MCNC: 5.5 G/DL (ref 6–8.2)
RBC # BLD AUTO: 2.99 M/UL (ref 4.7–6.1)
SIGNIFICANT IND 70042: NORMAL
SIGNIFICANT IND 70042: NORMAL
SITE SITE: NORMAL
SITE SITE: NORMAL
SODIUM SERPL-SCNC: 139 MMOL/L (ref 135–145)
SOURCE SOURCE: NORMAL
SOURCE SOURCE: NORMAL
WBC # BLD AUTO: 3.9 K/UL (ref 4.8–10.8)

## 2024-10-28 PROCEDURE — 97166 OT EVAL MOD COMPLEX 45 MIN: CPT

## 2024-10-28 PROCEDURE — A9270 NON-COVERED ITEM OR SERVICE: HCPCS

## 2024-10-28 PROCEDURE — 99232 SBSQ HOSP IP/OBS MODERATE 35: CPT | Performed by: NURSE PRACTITIONER

## 2024-10-28 PROCEDURE — 36415 COLL VENOUS BLD VENIPUNCTURE: CPT

## 2024-10-28 PROCEDURE — 99232 SBSQ HOSP IP/OBS MODERATE 35: CPT | Mod: GC | Performed by: INTERNAL MEDICINE

## 2024-10-28 PROCEDURE — 700102 HCHG RX REV CODE 250 W/ 637 OVERRIDE(OP)

## 2024-10-28 PROCEDURE — 97602 WOUND(S) CARE NON-SELECTIVE: CPT

## 2024-10-28 PROCEDURE — 85027 COMPLETE CBC AUTOMATED: CPT

## 2024-10-28 PROCEDURE — 80053 COMPREHEN METABOLIC PANEL: CPT

## 2024-10-28 PROCEDURE — 770001 HCHG ROOM/CARE - MED/SURG/GYN PRIV*

## 2024-10-28 PROCEDURE — 85055 RETICULATED PLATELET ASSAY: CPT

## 2024-10-28 PROCEDURE — 97535 SELF CARE MNGMENT TRAINING: CPT

## 2024-10-28 PROCEDURE — 700111 HCHG RX REV CODE 636 W/ 250 OVERRIDE (IP): Mod: JZ

## 2024-10-28 RX ADMIN — AMOXICILLIN AND CLAVULANATE POTASSIUM 1 TABLET: 875; 125 TABLET, FILM COATED ORAL at 05:37

## 2024-10-28 RX ADMIN — QUETIAPINE FUMARATE 200 MG: 100 TABLET ORAL at 17:12

## 2024-10-28 RX ADMIN — VALPROIC ACID 125 MG: 250 SOLUTION ORAL at 17:12

## 2024-10-28 RX ADMIN — VALPROIC ACID 125 MG: 250 SOLUTION ORAL at 12:13

## 2024-10-28 RX ADMIN — Medication 10 MG: at 20:37

## 2024-10-28 RX ADMIN — LORAZEPAM 2 MG: 2 TABLET ORAL at 05:37

## 2024-10-28 RX ADMIN — AMOXICILLIN AND CLAVULANATE POTASSIUM 1 TABLET: 875; 125 TABLET, FILM COATED ORAL at 17:12

## 2024-10-28 RX ADMIN — ENOXAPARIN SODIUM 40 MG: 100 INJECTION SUBCUTANEOUS at 17:12

## 2024-10-28 RX ADMIN — SENNOSIDES AND DOCUSATE SODIUM 2 TABLET: 50; 8.6 TABLET ORAL at 17:12

## 2024-10-28 RX ADMIN — OMEPRAZOLE 40 MG: 20 CAPSULE, DELAYED RELEASE ORAL at 05:37

## 2024-10-28 RX ADMIN — LORAZEPAM 2 MG: 2 TABLET ORAL at 17:12

## 2024-10-28 RX ADMIN — VALPROIC ACID 125 MG: 250 SOLUTION ORAL at 05:38

## 2024-10-28 RX ADMIN — LEVOTHYROXINE SODIUM 88 MCG: 0.09 TABLET ORAL at 05:37

## 2024-10-28 RX ADMIN — ACETAMINOPHEN 650 MG: 325 TABLET ORAL at 05:38

## 2024-10-28 RX ADMIN — TAMSULOSIN HYDROCHLORIDE 0.4 MG: 0.4 CAPSULE ORAL at 05:38

## 2024-10-28 ASSESSMENT — COGNITIVE AND FUNCTIONAL STATUS - GENERAL
EATING MEALS: A LITTLE
TOILETING: A LOT
SUGGESTED CMS G CODE MODIFIER DAILY ACTIVITY: CK
DAILY ACTIVITIY SCORE: 15
DRESSING REGULAR LOWER BODY CLOTHING: A LOT
PERSONAL GROOMING: A LITTLE
HELP NEEDED FOR BATHING: A LOT
DRESSING REGULAR UPPER BODY CLOTHING: A LITTLE

## 2024-10-28 ASSESSMENT — ENCOUNTER SYMPTOMS
CARDIOVASCULAR NEGATIVE: 1
MUSCULOSKELETAL NEGATIVE: 1
NEUROLOGICAL NEGATIVE: 1
GASTROINTESTINAL NEGATIVE: 1
EYES NEGATIVE: 1
CONSTITUTIONAL NEGATIVE: 1
RESPIRATORY NEGATIVE: 1

## 2024-10-28 ASSESSMENT — PATIENT HEALTH QUESTIONNAIRE - PHQ9
2. FEELING DOWN, DEPRESSED, IRRITABLE, OR HOPELESS: NOT AT ALL
1. LITTLE INTEREST OR PLEASURE IN DOING THINGS: NOT AT ALL
SUM OF ALL RESPONSES TO PHQ9 QUESTIONS 1 AND 2: 0

## 2024-10-28 ASSESSMENT — ACTIVITIES OF DAILY LIVING (ADL): TOILETING: REQUIRES ASSIST

## 2024-10-28 ASSESSMENT — PAIN DESCRIPTION - PAIN TYPE: TYPE: ACUTE PAIN;CHRONIC PAIN

## 2024-10-29 VITALS
RESPIRATION RATE: 18 BRPM | TEMPERATURE: 99 F | BODY MASS INDEX: 33.57 KG/M2 | SYSTOLIC BLOOD PRESSURE: 119 MMHG | WEIGHT: 226.63 LBS | OXYGEN SATURATION: 92 % | HEART RATE: 75 BPM | DIASTOLIC BLOOD PRESSURE: 54 MMHG | HEIGHT: 69 IN

## 2024-10-29 LAB
ALBUMIN SERPL BCP-MCNC: 2.9 G/DL (ref 3.2–4.9)
ALBUMIN/GLOB SERPL: 1.1 G/DL
ALP SERPL-CCNC: 367 U/L (ref 30–99)
ALT SERPL-CCNC: 127 U/L (ref 2–50)
ANION GAP SERPL CALC-SCNC: 8 MMOL/L (ref 7–16)
AST SERPL-CCNC: 129 U/L (ref 12–45)
BILIRUB SERPL-MCNC: 1.9 MG/DL (ref 0.1–1.5)
BUN SERPL-MCNC: 25 MG/DL (ref 8–22)
CALCIUM ALBUM COR SERPL-MCNC: 8.8 MG/DL (ref 8.5–10.5)
CALCIUM SERPL-MCNC: 7.9 MG/DL (ref 8.5–10.5)
CHLORIDE SERPL-SCNC: 107 MMOL/L (ref 96–112)
CO2 SERPL-SCNC: 23 MMOL/L (ref 20–33)
CREAT SERPL-MCNC: 1.6 MG/DL (ref 0.5–1.4)
ERYTHROCYTE [DISTWIDTH] IN BLOOD BY AUTOMATED COUNT: 53.7 FL (ref 35.9–50)
GFR SERPLBLD CREATININE-BSD FMLA CKD-EPI: 42 ML/MIN/1.73 M 2
GLOBULIN SER CALC-MCNC: 2.6 G/DL (ref 1.9–3.5)
GLUCOSE SERPL-MCNC: 108 MG/DL (ref 65–99)
HCT VFR BLD AUTO: 28.3 % (ref 42–52)
HGB BLD-MCNC: 9.9 G/DL (ref 14–18)
MCH RBC QN AUTO: 33.2 PG (ref 27–33)
MCHC RBC AUTO-ENTMCNC: 35 G/DL (ref 32.3–36.5)
MCV RBC AUTO: 95 FL (ref 81.4–97.8)
PLATELET # BLD AUTO: 83 K/UL (ref 164–446)
PLATELETS.RETICULATED NFR BLD AUTO: 3.3 % (ref 0.6–13.1)
PMV BLD AUTO: 10.9 FL (ref 9–12.9)
POTASSIUM SERPL-SCNC: 3.8 MMOL/L (ref 3.6–5.5)
PROT SERPL-MCNC: 5.5 G/DL (ref 6–8.2)
RBC # BLD AUTO: 2.98 M/UL (ref 4.7–6.1)
SODIUM SERPL-SCNC: 138 MMOL/L (ref 135–145)
WBC # BLD AUTO: 4.6 K/UL (ref 4.8–10.8)

## 2024-10-29 PROCEDURE — 36415 COLL VENOUS BLD VENIPUNCTURE: CPT

## 2024-10-29 PROCEDURE — 700102 HCHG RX REV CODE 250 W/ 637 OVERRIDE(OP)

## 2024-10-29 PROCEDURE — 99239 HOSP IP/OBS DSCHRG MGMT >30: CPT | Mod: GC | Performed by: INTERNAL MEDICINE

## 2024-10-29 PROCEDURE — A9270 NON-COVERED ITEM OR SERVICE: HCPCS

## 2024-10-29 PROCEDURE — 85055 RETICULATED PLATELET ASSAY: CPT

## 2024-10-29 PROCEDURE — 85027 COMPLETE CBC AUTOMATED: CPT

## 2024-10-29 PROCEDURE — 80053 COMPREHEN METABOLIC PANEL: CPT

## 2024-10-29 RX ADMIN — LEVOTHYROXINE SODIUM 88 MCG: 0.09 TABLET ORAL at 05:54

## 2024-10-29 RX ADMIN — TAMSULOSIN HYDROCHLORIDE 0.4 MG: 0.4 CAPSULE ORAL at 05:53

## 2024-10-29 RX ADMIN — LORAZEPAM 2 MG: 2 TABLET ORAL at 05:55

## 2024-10-29 RX ADMIN — VALPROIC ACID 125 MG: 250 SOLUTION ORAL at 11:50

## 2024-10-29 RX ADMIN — OMEPRAZOLE 40 MG: 20 CAPSULE, DELAYED RELEASE ORAL at 05:54

## 2024-10-29 RX ADMIN — AMOXICILLIN AND CLAVULANATE POTASSIUM 1 TABLET: 875; 125 TABLET, FILM COATED ORAL at 13:54

## 2024-10-29 RX ADMIN — AMOXICILLIN AND CLAVULANATE POTASSIUM 1 TABLET: 875; 125 TABLET, FILM COATED ORAL at 05:54

## 2024-10-29 RX ADMIN — VALPROIC ACID 125 MG: 250 SOLUTION ORAL at 05:55

## 2024-10-29 ASSESSMENT — PAIN DESCRIPTION - PAIN TYPE: TYPE: ACUTE PAIN;CHRONIC PAIN

## 2024-10-30 LAB
ALBUMIN SERPL ELPH-MCNC: 2.93 G/DL (ref 3.75–5.01)
ALPHA1 GLOB SERPL ELPH-MCNC: 0.35 G/DL (ref 0.19–0.46)
ALPHA2 GLOB SERPL ELPH-MCNC: 0.7 G/DL (ref 0.48–1.05)
B-GLOBULIN SERPL ELPH-MCNC: 0.73 G/DL (ref 0.48–1.1)
GAMMA GLOB SERPL ELPH-MCNC: 0.8 G/DL (ref 0.62–1.51)
INTERPRETATION SERPL IFE-IMP: ABNORMAL
MONOCLON BAND OBS SERPL: ABNORMAL
MONOCLONAL PROTEIN NL11656: ABNORMAL G/DL
PATHOLOGY STUDY: ABNORMAL
PROT SERPL-MCNC: 5.5 G/DL (ref 6.3–8.2)

## 2024-11-12 ENCOUNTER — APPOINTMENT (OUTPATIENT)
Dept: RADIOLOGY | Facility: MEDICAL CENTER | Age: 84
End: 2024-11-12
Attending: EMERGENCY MEDICINE
Payer: MEDICARE

## 2024-11-12 ENCOUNTER — HOSPITAL ENCOUNTER (EMERGENCY)
Facility: MEDICAL CENTER | Age: 84
End: 2024-11-12
Attending: EMERGENCY MEDICINE
Payer: MEDICARE

## 2024-11-12 VITALS
BODY MASS INDEX: 31.1 KG/M2 | TEMPERATURE: 97.6 F | HEIGHT: 69 IN | HEART RATE: 90 BPM | DIASTOLIC BLOOD PRESSURE: 70 MMHG | WEIGHT: 210 LBS | SYSTOLIC BLOOD PRESSURE: 149 MMHG | OXYGEN SATURATION: 91 % | RESPIRATION RATE: 20 BRPM

## 2024-11-12 DIAGNOSIS — R07.9 ACUTE CHEST PAIN: ICD-10-CM

## 2024-11-12 LAB
ALBUMIN SERPL BCP-MCNC: 4 G/DL (ref 3.2–4.9)
ALBUMIN/GLOB SERPL: 1.1 G/DL
ALP SERPL-CCNC: 349 U/L (ref 30–99)
ALT SERPL-CCNC: 55 U/L (ref 2–50)
ANION GAP SERPL CALC-SCNC: 12 MMOL/L (ref 7–16)
AST SERPL-CCNC: 60 U/L (ref 12–45)
BASOPHILS # BLD AUTO: 0.8 % (ref 0–1.8)
BASOPHILS # BLD: 0.05 K/UL (ref 0–0.12)
BILIRUB SERPL-MCNC: 1.1 MG/DL (ref 0.1–1.5)
BUN SERPL-MCNC: 30 MG/DL (ref 8–22)
CALCIUM ALBUM COR SERPL-MCNC: 9.1 MG/DL (ref 8.5–10.5)
CALCIUM SERPL-MCNC: 9.1 MG/DL (ref 8.5–10.5)
CHLORIDE SERPL-SCNC: 106 MMOL/L (ref 96–112)
CO2 SERPL-SCNC: 21 MMOL/L (ref 20–33)
CREAT SERPL-MCNC: 2.18 MG/DL (ref 0.5–1.4)
EKG IMPRESSION: NORMAL
EOSINOPHIL # BLD AUTO: 0.11 K/UL (ref 0–0.51)
EOSINOPHIL NFR BLD: 1.9 % (ref 0–6.9)
ERYTHROCYTE [DISTWIDTH] IN BLOOD BY AUTOMATED COUNT: 56.1 FL (ref 35.9–50)
GFR SERPLBLD CREATININE-BSD FMLA CKD-EPI: 29 ML/MIN/1.73 M 2
GLOBULIN SER CALC-MCNC: 3.8 G/DL (ref 1.9–3.5)
GLUCOSE SERPL-MCNC: 98 MG/DL (ref 65–99)
HCT VFR BLD AUTO: 35.8 % (ref 42–52)
HGB BLD-MCNC: 12.2 G/DL (ref 14–18)
IMM GRANULOCYTES # BLD AUTO: 0.02 K/UL (ref 0–0.11)
IMM GRANULOCYTES NFR BLD AUTO: 0.3 % (ref 0–0.9)
LYMPHOCYTES # BLD AUTO: 2.46 K/UL (ref 1–4.8)
LYMPHOCYTES NFR BLD: 41.6 % (ref 22–41)
MCH RBC QN AUTO: 34.2 PG (ref 27–33)
MCHC RBC AUTO-ENTMCNC: 34.1 G/DL (ref 32.3–36.5)
MCV RBC AUTO: 100.3 FL (ref 81.4–97.8)
MONOCYTES # BLD AUTO: 0.54 K/UL (ref 0–0.85)
MONOCYTES NFR BLD AUTO: 9.1 % (ref 0–13.4)
NEUTROPHILS # BLD AUTO: 2.74 K/UL (ref 1.82–7.42)
NEUTROPHILS NFR BLD: 46.3 % (ref 44–72)
NRBC # BLD AUTO: 0 K/UL
NRBC BLD-RTO: 0 /100 WBC (ref 0–0.2)
NT-PROBNP SERPL IA-MCNC: 78 PG/ML (ref 0–125)
PLATELET # BLD AUTO: 191 K/UL (ref 164–446)
PMV BLD AUTO: 9.7 FL (ref 9–12.9)
POTASSIUM SERPL-SCNC: 4.6 MMOL/L (ref 3.6–5.5)
PROT SERPL-MCNC: 7.8 G/DL (ref 6–8.2)
RBC # BLD AUTO: 3.57 M/UL (ref 4.7–6.1)
SODIUM SERPL-SCNC: 139 MMOL/L (ref 135–145)
TROPONIN T SERPL-MCNC: 17 NG/L (ref 6–19)
WBC # BLD AUTO: 5.9 K/UL (ref 4.8–10.8)

## 2024-11-12 PROCEDURE — 80053 COMPREHEN METABOLIC PANEL: CPT

## 2024-11-12 PROCEDURE — 83880 ASSAY OF NATRIURETIC PEPTIDE: CPT

## 2024-11-12 PROCEDURE — 71045 X-RAY EXAM CHEST 1 VIEW: CPT

## 2024-11-12 PROCEDURE — 85025 COMPLETE CBC W/AUTO DIFF WBC: CPT

## 2024-11-12 PROCEDURE — 93005 ELECTROCARDIOGRAM TRACING: CPT | Performed by: EMERGENCY MEDICINE

## 2024-11-12 PROCEDURE — 36415 COLL VENOUS BLD VENIPUNCTURE: CPT

## 2024-11-12 PROCEDURE — 99285 EMERGENCY DEPT VISIT HI MDM: CPT

## 2024-11-12 PROCEDURE — 84484 ASSAY OF TROPONIN QUANT: CPT

## 2024-11-12 ASSESSMENT — FIBROSIS 4 INDEX: FIB4 SCORE: 11.58

## 2024-11-12 ASSESSMENT — PAIN DESCRIPTION - PAIN TYPE: TYPE: ACUTE PAIN

## 2024-11-12 NOTE — ED TRIAGE NOTES
".  Chief Complaint   Patient presents with    Chest Pain     Pt BIBA from  for chest burning pt has been having the past couple days. Staff at group home gave pt 81 mg aspirin around 0100. Pt denies chest pain/burning sensation at this time.     A&O self and situation at baseline hx dementia  Stable on RA  Pt comfort care per POLST at bedside    ./78   Pulse 75   Temp 36.2 °C (97.2 °F) (Temporal)   Resp 16   Ht 1.753 m (5' 9\")   Wt 95.3 kg (210 lb)   SpO2 95%   BMI 31.01 kg/m²     "

## 2024-11-12 NOTE — ED PROVIDER NOTES
ER Provider Note    Scribed for Miguel Angel Corley M.D. by Pato Torrez. 11/12/2024   11:30 AM    Primary Care Provider: Nuha Ware M.D.    CHIEF COMPLAINT  Chief Complaint   Patient presents with    Chest Pain     EXTERNAL RECORDS REVIEWED  Inpatient Notes Patient is a comfort care patient.     HPI/ROS  LIMITATION TO HISTORY   Select: Dementia.   OUTSIDE HISTORIAN(S):  EMS run sheet reports patient had been complaining of bilateral burning chest pain for 20 years    Sina Lewis is a 84 y.o. male who presents to the ED for evaluation of shortness of breath onset prior to arrival today. Patient reports that his shortness of breath has resolved and he is feeling improved at this time. Patient denies any current chest pain or shortness of breath. He further denies any abdominal pain or other symptoms. He states that he feels great at this time. Patient has been compliant with his medications. The history for this encounter is limited due to the patient's dementia.       PAST MEDICAL HISTORY  Past Medical History:   Diagnosis Date    Abdominal pain, bilateral lower quadrant     x 6 months    Acute MI (HCC)     Arrhythmia     Arthritis     Backpain     Bronchitis     Congestive heart failure (CMS-HCC) (HCC)     Diverticulitis     Heart murmur     Hypertension     Pneumonia     Psychiatric problem     bipolar    Thyroid disorder        SURGICAL HISTORY  Past Surgical History:   Procedure Laterality Date    HERNIA REPAIR      OTHER CARDIAC SURGERY      angioplasty 1996       FAMILY HISTORY  No family history on file.    SOCIAL HISTORY   reports that he quit smoking about 60 years ago. He has never used smokeless tobacco. He reports current alcohol use. He reports that he does not use drugs.    CURRENT MEDICATIONS  Previous Medications    ACETAMINOPHEN (TYLENOL) 500 MG TAB    Take 500 mg by mouth every 6 hours as needed for Mild Pain.    BENAZEPRIL (LOTENSIN) 20 MG TAB    Take 10 mg by mouth every evening.     "DIVALPROEX (DEPAKOTE SPRINKLE) 125 MG CAPSULE DELAYED RELEASE SPRINKLE    Take 125 mg by mouth 3 times a day.    HYDROXYZINE HCL (ATARAX) 25 MG TAB    Take 1 Tablet by mouth 3 times a day as needed for Itching.    LEVOTHYROXINE (SYNTHROID) 88 MCG TAB    Take 88 mcg by mouth every morning on an empty stomach.    MELATONIN 10 MG TAB    Take 10 mg by mouth at bedtime.    OMEPRAZOLE (PRILOSEC) 40 MG DELAYED-RELEASE CAPSULE    Take 40 mg by mouth every day.    QUETIAPINE (SEROQUEL) 200 MG TAB    Take 200 mg by mouth every evening.    TAMSULOSIN (FLOMAX) 0.4 MG CAPSULE    Take 0.4 mg by mouth every day.       ALLERGIES  Allergies   Allergen Reactions    Nkda [No Known Drug Allergy]         PHYSICAL EXAM  /78   Pulse 75   Temp 36.2 °C (97.2 °F) (Temporal)   Resp 16   Ht 1.753 m (5' 9\")   Wt 95.3 kg (210 lb)   SpO2 95%   BMI 31.01 kg/m²    Nursing note and vitals reviewed.  Constitutional: Elderly pleasant, talkative.  Well-developed and well-nourished. No distress. Dementia at baseline.   HENT: Head is normocephalic and atraumatic. Oropharynx is clear and moist without exudate or erythema.   Eyes: Pupils are equal, round, and reactive to light. Conjunctiva are normal.   Cardiovascular: Normal rate and regular rhythm. No murmur heard. Normal radial pulses.  Pulmonary/Chest: Breath sounds normal. No wheezes or rales.   Abdominal: Soft and non-tender. No distention    Musculoskeletal: Extremities exhibit normal range of motion without edema or tenderness.   Neurological: Awake, alert and oriented to person, place, and time. No focal deficits noted.  Skin: Skin is warm and dry. No rash.   Psychiatric: Normal mood and affect. Appropriate for clinical situation    DIAGNOSTIC STUDIES    Labs:   Results for orders placed or performed during the hospital encounter of 11/12/24   CBC with Differential    Collection Time: 11/12/24 12:02 PM   Result Value Ref Range    WBC 5.9 4.8 - 10.8 K/uL    RBC 3.57 (L) 4.70 - 6.10 " M/uL    Hemoglobin 12.2 (L) 14.0 - 18.0 g/dL    Hematocrit 35.8 (L) 42.0 - 52.0 %    .3 (H) 81.4 - 97.8 fL    MCH 34.2 (H) 27.0 - 33.0 pg    MCHC 34.1 32.3 - 36.5 g/dL    RDW 56.1 (H) 35.9 - 50.0 fL    Platelet Count 191 164 - 446 K/uL    MPV 9.7 9.0 - 12.9 fL    Neutrophils-Polys 46.30 44.00 - 72.00 %    Lymphocytes 41.60 (H) 22.00 - 41.00 %    Monocytes 9.10 0.00 - 13.40 %    Eosinophils 1.90 0.00 - 6.90 %    Basophils 0.80 0.00 - 1.80 %    Immature Granulocytes 0.30 0.00 - 0.90 %    Nucleated RBC 0.00 0.00 - 0.20 /100 WBC    Neutrophils (Absolute) 2.74 1.82 - 7.42 K/uL    Lymphs (Absolute) 2.46 1.00 - 4.80 K/uL    Monos (Absolute) 0.54 0.00 - 0.85 K/uL    Eos (Absolute) 0.11 0.00 - 0.51 K/uL    Baso (Absolute) 0.05 0.00 - 0.12 K/uL    Immature Granulocytes (abs) 0.02 0.00 - 0.11 K/uL    NRBC (Absolute) 0.00 K/uL   Complete Metabolic Panel (CMP)    Collection Time: 11/12/24 12:02 PM   Result Value Ref Range    Sodium 139 135 - 145 mmol/L    Potassium 4.6 3.6 - 5.5 mmol/L    Chloride 106 96 - 112 mmol/L    Co2 21 20 - 33 mmol/L    Anion Gap 12.0 7.0 - 16.0    Glucose 98 65 - 99 mg/dL    Bun 30 (H) 8 - 22 mg/dL    Creatinine 2.18 (H) 0.50 - 1.40 mg/dL    Calcium 9.1 8.5 - 10.5 mg/dL    Correct Calcium 9.1 8.5 - 10.5 mg/dL    AST(SGOT) 60 (H) 12 - 45 U/L    ALT(SGPT) 55 (H) 2 - 50 U/L    Alkaline Phosphatase 349 (H) 30 - 99 U/L    Total Bilirubin 1.1 0.1 - 1.5 mg/dL    Albumin 4.0 3.2 - 4.9 g/dL    Total Protein 7.8 6.0 - 8.2 g/dL    Globulin 3.8 (H) 1.9 - 3.5 g/dL    A-G Ratio 1.1 g/dL   proBrain Natriuretic Peptide, NT (BNP)    Collection Time: 11/12/24 12:02 PM   Result Value Ref Range    NT-proBNP 78 0 - 125 pg/mL   TROPONIN    Collection Time: 11/12/24 12:02 PM   Result Value Ref Range    Troponin T 17 6 - 19 ng/L   ESTIMATED GFR    Collection Time: 11/12/24 12:02 PM   Result Value Ref Range    GFR (CKD-EPI) 29 (A) >60 mL/min/1.73 m 2   EKG    Collection Time: 11/12/24 12:07 PM   Result Value Ref Range     Report       [facility] Emergency Dept.    Test Date:  2024  Pt Name:    LOLITA CHIU             Department: ER  MRN:        [id number]                      Room:        16  Gender:     Male                         Technician: [id number]  :        [id number]                   Requested By:ER TRIAGE PROTOCOL  Order #:    [id number]                    Reading MD:    Measurements  Intervals                                Axis  Rate:       67                           P:          8  UT:         263                          QRS:        -37  QRSD:       98                           T:          51  QT:         437  QTc:        462    Interpretive Statements  Sinus rhythm  Prolonged UT interval  Left axis deviation  Abnormal R-wave progression, early transition  Compared to ECG 2022 16:18:47  Left-axis deviation now present  Left anterior fascicular block no longer present         EKG:   I have independently interpreted this EKG as detailed above.     Radiology:   This attending emergency physician has independently interpreted the diagnostic imaging associated with this visit and is awaiting the final reading from the radiologist.   Preliminary interpretation is a follows: Chest x-ray shows no evidence of pneumonia or heart failure    Radiologist interpretation:   DX-CHEST-PORTABLE (1 VIEW)   Final Result      No radiographic evidence of acute cardiopulmonary disease.           INITIAL ASSESSMENT AND PLAN    11:30 AM - Patient was evaluated at bedside. Patient has no complaints at this time, per report he has a long history of dementia. Pulst form request comfort care. Laboratory studies were ordered per protocol.    12:49 PM - LFT are elevated but are consistent with prior values. WBC is normal at this time. His troponin is not elevated. I discussed these diagnostic study results with the patient.  I discussed plan for discharge and follow up as outlined below. The patient is stable for  discharge at this time and will return for any new or worsening symptoms. Patient verbalizes understanding and support with my plan for discharge.      Emergency department evaluation is reassuring.  Troponin is not elevated.  POLST form indicates that the patient would like to prioritize comfort care.  Patient is transferred back to his care facility.     DISPOSITION AND DISCUSSIONS    I have discussed management of the patient with the following physicians and BRITTNEY's:  None.     Discussion of management with other [facility] or appropriate source(s): None     Barriers to care at this time, including but not limited to:  None .     The patient will return for new or worsening symptoms and is stable at the time of discharge.    DISPOSITION:  Patient will be discharged home in stable condition.    FOLLOW UP:  Nuha Ware M.D.  [address] [place]  [place] [id number] [phone]    Schedule an appointment as soon as possible for a visit       [facility], Emergency Dept  [address] [address] [phone]    If symptoms worsen      FINAL DIAGNOSIS  1. Acute chest pain         Pato KNOX (Nidhi), am scribing for, and in the presence of, Miguel Angel Corley M.D..    Electronically signed by: Pato Torrez (Nidhi), 11/12/2024    Miguel Angel KNOX M.D. personally performed the services described in this documentation, as scribed by Pato Torrez in my presence, and it is both accurate and complete.      The note accurately reflects work and decisions made by me.  Miguel Angel Corley M.D.  11/12/2024  1:24 PM

## 2024-11-12 NOTE — ED NOTES
Pt stable for discharge. Pt educated and reviewed discharge instructions with RN. Pt verbalized understanding & all questions were answered. Pt AoX 2 (BL for pt). Pt wheeled out by T with all their belongings    This RN attempted to call report two times to Marlen Baez. No messaging set up. Will reattempt    Marlen Baez   [phone]

## 2024-11-12 NOTE — DISCHARGE PLANNING
RADHA set up transportation per RN request- RADHA called [facility] to verify pt was a resident there, confirmed with Marlen Dooley.  Marlen Dooley requested to please get a medical update at [phone]  and to make sure to send POLST back with patient.  RADHA set up [employer] wheelchair transportation for 330-4pm, RN notified.

## 2024-11-15 ENCOUNTER — HOSPITAL ENCOUNTER (EMERGENCY)
Facility: MEDICAL CENTER | Age: 84
End: 2024-11-15
Attending: EMERGENCY MEDICINE
Payer: MEDICARE

## 2024-11-15 ENCOUNTER — APPOINTMENT (OUTPATIENT)
Dept: RADIOLOGY | Facility: MEDICAL CENTER | Age: 84
End: 2024-11-15
Attending: EMERGENCY MEDICINE
Payer: MEDICARE

## 2024-11-15 VITALS
TEMPERATURE: 97.3 F | DIASTOLIC BLOOD PRESSURE: 58 MMHG | RESPIRATION RATE: 18 BRPM | HEIGHT: 69 IN | BODY MASS INDEX: 31.25 KG/M2 | SYSTOLIC BLOOD PRESSURE: 114 MMHG | WEIGHT: 211 LBS | HEART RATE: 64 BPM | OXYGEN SATURATION: 96 %

## 2024-11-15 DIAGNOSIS — N18.30 STAGE 3 CHRONIC KIDNEY DISEASE, UNSPECIFIED WHETHER STAGE 3A OR 3B CKD: ICD-10-CM

## 2024-11-15 DIAGNOSIS — N18.9 CHRONIC KIDNEY DISEASE, UNSPECIFIED CKD STAGE: ICD-10-CM

## 2024-11-15 DIAGNOSIS — R07.9 CHEST PAIN, UNSPECIFIED TYPE: ICD-10-CM

## 2024-11-15 DIAGNOSIS — F03.93 DEMENTIA WITH MOOD DISTURBANCE, UNSPECIFIED DEMENTIA SEVERITY, UNSPECIFIED DEMENTIA TYPE (HCC): ICD-10-CM

## 2024-11-15 LAB
ALBUMIN SERPL BCP-MCNC: 3.9 G/DL (ref 3.2–4.9)
ALBUMIN/GLOB SERPL: 1 G/DL
ALP SERPL-CCNC: 316 U/L (ref 30–99)
ALT SERPL-CCNC: 50 U/L (ref 2–50)
ANION GAP SERPL CALC-SCNC: 16 MMOL/L (ref 7–16)
AST SERPL-CCNC: 54 U/L (ref 12–45)
BASOPHILS # BLD AUTO: 0.8 % (ref 0–1.8)
BASOPHILS # BLD: 0.05 K/UL (ref 0–0.12)
BILIRUB SERPL-MCNC: 1.3 MG/DL (ref 0.1–1.5)
BUN SERPL-MCNC: 31 MG/DL (ref 8–22)
CALCIUM ALBUM COR SERPL-MCNC: 8.8 MG/DL (ref 8.5–10.5)
CALCIUM SERPL-MCNC: 8.7 MG/DL (ref 8.5–10.5)
CHLORIDE SERPL-SCNC: 103 MMOL/L (ref 96–112)
CO2 SERPL-SCNC: 18 MMOL/L (ref 20–33)
CREAT SERPL-MCNC: 2.06 MG/DL (ref 0.5–1.4)
CRP SERPL HS-MCNC: 0.48 MG/DL (ref 0–0.75)
EKG IMPRESSION: NORMAL
EOSINOPHIL # BLD AUTO: 0.11 K/UL (ref 0–0.51)
EOSINOPHIL NFR BLD: 1.7 % (ref 0–6.9)
ERYTHROCYTE [DISTWIDTH] IN BLOOD BY AUTOMATED COUNT: 56 FL (ref 35.9–50)
GFR SERPLBLD CREATININE-BSD FMLA CKD-EPI: 31 ML/MIN/1.73 M 2
GLOBULIN SER CALC-MCNC: 3.8 G/DL (ref 1.9–3.5)
GLUCOSE SERPL-MCNC: 91 MG/DL (ref 65–99)
HCT VFR BLD AUTO: 36.1 % (ref 42–52)
HGB BLD-MCNC: 12.4 G/DL (ref 14–18)
IMM GRANULOCYTES # BLD AUTO: 0.03 K/UL (ref 0–0.11)
IMM GRANULOCYTES NFR BLD AUTO: 0.5 % (ref 0–0.9)
LYMPHOCYTES # BLD AUTO: 2.63 K/UL (ref 1–4.8)
LYMPHOCYTES NFR BLD: 40 % (ref 22–41)
MCH RBC QN AUTO: 34.5 PG (ref 27–33)
MCHC RBC AUTO-ENTMCNC: 34.3 G/DL (ref 32.3–36.5)
MCV RBC AUTO: 100.6 FL (ref 81.4–97.8)
MONOCYTES # BLD AUTO: 0.65 K/UL (ref 0–0.85)
MONOCYTES NFR BLD AUTO: 9.9 % (ref 0–13.4)
NEUTROPHILS # BLD AUTO: 3.11 K/UL (ref 1.82–7.42)
NEUTROPHILS NFR BLD: 47.1 % (ref 44–72)
NRBC # BLD AUTO: 0 K/UL
NRBC BLD-RTO: 0 /100 WBC (ref 0–0.2)
NT-PROBNP SERPL IA-MCNC: 148 PG/ML (ref 0–125)
PLATELET # BLD AUTO: 152 K/UL (ref 164–446)
PMV BLD AUTO: 10.1 FL (ref 9–12.9)
POTASSIUM SERPL-SCNC: 3.8 MMOL/L (ref 3.6–5.5)
PROCALCITONIN SERPL-MCNC: 0.16 NG/ML
PROT SERPL-MCNC: 7.7 G/DL (ref 6–8.2)
RBC # BLD AUTO: 3.59 M/UL (ref 4.7–6.1)
SODIUM SERPL-SCNC: 137 MMOL/L (ref 135–145)
TROPONIN T SERPL-MCNC: 22 NG/L (ref 6–19)
TROPONIN T SERPL-MCNC: 23 NG/L (ref 6–19)
WBC # BLD AUTO: 6.6 K/UL (ref 4.8–10.8)

## 2024-11-15 PROCEDURE — 71045 X-RAY EXAM CHEST 1 VIEW: CPT

## 2024-11-15 PROCEDURE — 84484 ASSAY OF TROPONIN QUANT: CPT

## 2024-11-15 PROCEDURE — 84145 PROCALCITONIN (PCT): CPT

## 2024-11-15 PROCEDURE — 99285 EMERGENCY DEPT VISIT HI MDM: CPT

## 2024-11-15 PROCEDURE — 85025 COMPLETE CBC W/AUTO DIFF WBC: CPT

## 2024-11-15 PROCEDURE — A9270 NON-COVERED ITEM OR SERVICE: HCPCS | Performed by: EMERGENCY MEDICINE

## 2024-11-15 PROCEDURE — 700102 HCHG RX REV CODE 250 W/ 637 OVERRIDE(OP): Performed by: EMERGENCY MEDICINE

## 2024-11-15 PROCEDURE — 80053 COMPREHEN METABOLIC PANEL: CPT

## 2024-11-15 PROCEDURE — 93005 ELECTROCARDIOGRAM TRACING: CPT

## 2024-11-15 PROCEDURE — 86140 C-REACTIVE PROTEIN: CPT

## 2024-11-15 PROCEDURE — 83880 ASSAY OF NATRIURETIC PEPTIDE: CPT

## 2024-11-15 PROCEDURE — 36415 COLL VENOUS BLD VENIPUNCTURE: CPT

## 2024-11-15 PROCEDURE — 93005 ELECTROCARDIOGRAM TRACING: CPT | Performed by: EMERGENCY MEDICINE

## 2024-11-15 RX ORDER — ASPIRIN 81 MG/1
81 TABLET ORAL DAILY
COMMUNITY

## 2024-11-15 RX ORDER — LIDOCAINE 4 G/G
1 PATCH TOPICAL
COMMUNITY

## 2024-11-15 RX ORDER — MORPHINE SULFATE 20 MG/ML
5 SOLUTION ORAL
COMMUNITY

## 2024-11-15 RX ORDER — SENNOSIDES A AND B 8.6 MG/1
8.6 TABLET, FILM COATED ORAL 2 TIMES DAILY PRN
COMMUNITY

## 2024-11-15 RX ORDER — ASPIRIN 81 MG/1
324 TABLET, CHEWABLE ORAL ONCE
Status: COMPLETED | OUTPATIENT
Start: 2024-11-15 | End: 2024-11-15

## 2024-11-15 RX ORDER — DEXTROMETHORPHAN POLISTIREX 30 MG/5ML
30 SUSPENSION ORAL EVERY 8 HOURS PRN
COMMUNITY

## 2024-11-15 RX ORDER — HYDROCODONE BITARTRATE AND ACETAMINOPHEN 5; 325 MG/1; MG/1
1 TABLET ORAL ONCE
Status: DISCONTINUED | OUTPATIENT
Start: 2024-11-15 | End: 2024-11-15 | Stop reason: HOSPADM

## 2024-11-15 RX ADMIN — LIDOCAINE HYDROCHLORIDE 30 ML: 20 SOLUTION ORAL; TOPICAL at 03:44

## 2024-11-15 RX ADMIN — ASPIRIN 324 MG: 81 TABLET, CHEWABLE ORAL at 03:45

## 2024-11-15 ASSESSMENT — HEART SCORE
HEART SCORE: 5
TROPONIN: 1-3 TIMES NORMAL LIMIT
HISTORY: SLIGHTLY SUSPICIOUS
RISK FACTORS: >2 RISK FACTORS OR HX OF ATHEROSCLEROTIC DISEASE
ECG: NORMAL
AGE: 65+

## 2024-11-15 ASSESSMENT — FIBROSIS 4 INDEX: FIB4 SCORE: 3.56

## 2024-11-15 NOTE — ED NOTES
Pt fed breakfast and fluids, tolerated well. Pt is A&Ox2, hx of dementia and is at baseline. Pt placed on bed alarm, socks on pt and fall risk sign  in door.

## 2024-11-15 NOTE — ED NOTES
Bedside report received from off going RN/tech: Vi MEHTA, assumed care of patient.  POC discussed with patient. Call light within reach, all needs addressed at this time.       Fall risk interventions in place: Move the patient closer to the nurse's station, Patient's personal possessions are with in their safe reach, Place socks on patient, Place fall risk sign on patient's door, Give patient urinal if applicable, Keep floor surfaces clean and dry, and Accompanied to restroom (all applicable per [place] Fall risk assessment)   Continuous monitoring: Cardiac Leads, Pulse Ox, or Blood Pressure  IVF/IV medications: Not Applicable   Oxygen: Room Air  Bedside sitter: Not Applicable   Isolation: Not Applicable

## 2024-11-15 NOTE — ED NOTES
Rounded with pt, turned onto left side.  Denies further needs/concerns at this time. Remains on continuous monitoring, call light within reach.

## 2024-11-15 NOTE — ED NOTES
Patient medicated per MAR. Resting comfortably with calm, unlabored breathing. No further needs at this time.

## 2024-11-15 NOTE — ED NOTES
Rounded on patient, resting comfortably with maida, unlabored breathing. No further needs at this time.

## 2024-11-15 NOTE — ED PROVIDER NOTES
ED Provider Note    CHIEF COMPLAINT  Chief Complaint   Patient presents with    Chest Pain     Pt c/o non radiating substernal chest pain that began today, hx of GERD and dementia, pt denies SOB, n/v.      EXTERNAL RECORDS REVIEWED  Patient was seen in the emergency department 11/12/2024 for chest pain with a negative workup at that time.  He was last admitted October 2024 for sepsis related to likely aspiration pneumonia.  POLST from 2022 was reviewed showing comfort measures only.    HPI/ROS  LIMITATION TO HISTORY   Select: Altered mental status / Confusion  OUTSIDE HISTORIAN(S):  None    Sina Lewis is a 84 y.o. male with history of dementia who presents to the Emergency Department with chest pain.  Patient states he is not feeling well and feels that he is having a heart attack.  He describes a burning sensation across his chest which started this evening.  He reports associated shortness of breath as well as nausea however no vomiting.  He does state he has had this pain in the past related to his heart.  He does also have a history of acid reflux disease which he takes medications for.  No fever, cough or lower extremity swelling.    PAST MEDICAL HISTORY  Past Medical History:   Diagnosis Date    Abdominal pain, bilateral lower quadrant     x 6 months    Acute MI (HCC)     Arrhythmia     Arthritis     Backpain     Bronchitis     Congestive heart failure (HCC)     Diverticulitis     Heart murmur     Hypertension     Pneumonia     Psychiatric problem     bipolar    Thyroid disorder         SURGICAL HISTORY  Past Surgical History:   Procedure Laterality Date    HERNIA REPAIR      OTHER CARDIAC SURGERY      angioplasty 1996        FAMILY HISTORY  History reviewed. No pertinent family history.    SOCIAL HISTORY   reports that he quit smoking about 60 years ago. He has never used smokeless tobacco. He reports current alcohol use. He reports that he does not use drugs.    CURRENT MEDICATIONS  Previous  "Medications    ACETAMINOPHEN (TYLENOL) 500 MG TAB    Take 500 mg by mouth every 6 hours as needed for Mild Pain.    BENAZEPRIL (LOTENSIN) 20 MG TAB    Take 10 mg by mouth every evening.    DIVALPROEX (DEPAKOTE SPRINKLE) 125 MG CAPSULE DELAYED RELEASE SPRINKLE    Take 125 mg by mouth 3 times a day.    HYDROXYZINE HCL (ATARAX) 25 MG TAB    Take 1 Tablet by mouth 3 times a day as needed for Itching.    LEVOTHYROXINE (SYNTHROID) 88 MCG TAB    Take 88 mcg by mouth every morning on an empty stomach.    MELATONIN 10 MG TAB    Take 10 mg by mouth at bedtime.    OMEPRAZOLE (PRILOSEC) 40 MG DELAYED-RELEASE CAPSULE    Take 40 mg by mouth every day.    QUETIAPINE (SEROQUEL) 200 MG TAB    Take 200 mg by mouth every evening.    TAMSULOSIN (FLOMAX) 0.4 MG CAPSULE    Take 0.4 mg by mouth every day.       ALLERGIES  Nkda [no known drug allergy]    PHYSICAL EXAM  /64   Pulse (!) 58   Temp 36.3 °C (97.4 °F) (Temporal)   Resp 14   Ht 1.753 m (5' 9\")   Wt 95.7 kg (211 lb)   SpO2 96%      Constitutional: Nontoxic appearing. Alert in no apparent distress.  HENT: Normocephalic, Atraumatic. Bilateral external ears normal. Nose normal.  Moist mucous membranes.  Oropharynx clear.  Eyes: Pupils are equal and reactive. Conjunctiva normal.   Neck: Supple, full range of motion  Heart: Regular rate and rhythm.  No murmurs.    Lungs: No respiratory distress, normal work of breathing. Lungs clear to auscultation bilaterally.  Abdomen Soft, no distention.  No tenderness to palpation.  Musculoskeletal: Atraumatic. No obvious deformities noted.  No lower extremity edema.  Skin: Warm, Dry.  No erythema, No rash.   Neurologic: Alert and oriented x3. Moving all extremities spontaneously without focal deficits.  Psychiatric: Affect normal, Mood normal, Appears appropriate and not intoxicated.      DIAGNOSTIC STUDIES / PROCEDURES    EKG  I have independently interpreted this EKG  Results for orders placed or performed during the hospital " encounter of 11/15/24   EKG   Result Value Ref Range    Report       [facility] Emergency Dept.    Test Date:  2024-11-15  Pt Name:    LOLITA CHIU             Department: ER  MRN:        [id number]                      Room:        25  Gender:     Male                         Technician: [id number]  :        [id number]                   Requested By:ER TRIAGE PROTOCOL  Order #:    [id number]                    Reading MD: Soila Johnson MD    Measurements  Intervals                                Axis  Rate:       61                           P:          0  OR:         80                           QRS:        -37  QRSD:       112                          T:          64  QT:         426  QTc:        429    Interpretive Statements  Sinus rhythm  Short OR interval  Borderline IVCD with LAD  Abnormal R-wave progression, late transition  Compared to ECG 2024 12:07:01  No significant change from prior  Electronically Signed On 11- 03:24:45 PST by Soila Johnson MD         LABS  Labs Reviewed   CBC WITH DIFFERENTIAL - Abnormal; Notable for the following components:       Result Value    RBC 3.59 (*)     Hemoglobin 12.4 (*)     Hematocrit 36.1 (*)     .6 (*)     MCH 34.5 (*)     RDW 56.0 (*)     Platelet Count 152 (*)     All other components within normal limits   COMP METABOLIC PANEL - Abnormal; Notable for the following components:    Co2 18 (*)     Bun 31 (*)     Creatinine 2.06 (*)     AST(SGOT) 54 (*)     Alkaline Phosphatase 316 (*)     Globulin 3.8 (*)     All other components within normal limits   PROBRAIN NATRIURETIC PEPTIDE, NT - Abnormal; Notable for the following components:    NT-proBNP 148 (*)     All other components within normal limits   TROPONIN - Abnormal; Notable for the following components:    Troponin T 22 (*)     All other components within normal limits   TROPONIN - Abnormal; Notable for the following components:    Troponin T 23 (*)     All other  components within normal limits   ESTIMATED GFR - Abnormal; Notable for the following components:    GFR (CKD-EPI) 31 (*)     All other components within normal limits   PROCALCITONIN   CRP QUANTITIVE (NON-CARDIAC)         RADIOLOGY  I have independently interpreted the diagnostic imaging associated with this visit and am waiting the final reading from the radiologist.   My preliminary interpretation is as follows: no infiltrate    Radiologist interpretation:  DX-CHEST-PORTABLE (1 VIEW)   Final Result         Hazy bibasilar opacities, atelectasis or infection.            COURSE & MEDICAL DECISION MAKING  ED OBS: Yes; I am placing the patient in to an observation status due to a diagnostic uncertainty as well as therapeutic intensity. Patient placed in observation status at 2:19 AM, 11/15/2024.     Observation plan is as follows: labs, imaging, discussion with group home    7:02AM Upon reassessment, patient is resting comfortably with normal vital signs.  He continues to complain of ongoing pain in his heart and is somewhat confused why we are not doing anything else for him.  He is also concerned about having money to get a cab home.  Attempted to reassure him.  Social work is trying to get in touch with his group home and hopefully he will be discharged back there.  Discussed results with patient and/or family as well as importance of primary care follow up.  Patient understands plan of care and strict return precautions for new or changing symptoms.     8:00 AM - Kaur with social work has gotten in touch with the patient's group home who is refusing to take the patient back stating he needs a higher level of care.  They cannot manage his behaviors and he does not walk without max assist.  Patient will need to be signed out to my oncoming partner on observation for further placement.  Will consult hospitalist for consultation.        CHEST PAIN:   HEART Score for Major Cardiac Events  HEART Score     History:  Slightly suspicious  ECG: Normal  Age: 65+  Risk Factors: >2 risk factors or hx of atherosclerotic disease  Troponin: 1-3 times normal limit    Heart Score: 5    Total Score   0-3 Points = Low Score, risk of MACE 0.9-1.7%.  4-6 Points = Moderate Score, risk of MACE 12-16.6%  7-10 Points = High Score, risk of MACE 50-65%      ASSESSMENT, COURSE AND PLAN  Care Narrative: Patient with history of dementia currently residing in group home who presents with reports of chest pain.  He was recently seen here for similar presentation with normal workup.  He does have a POLST that indicates comfort care measures only.  He has normal vital signs on arrival.  EKG is unchanged from prior without evidence of ischemia or arrhythmia.  Troponin is minimally elevated however near baseline for the patient and repeat is stable.  BNP only minimally elevated without concern for heart failure.  His chest x-ray does not show significant pneumonia or pulmonary edema.  Questionable bibasilar opacities however procalcitonin and CRP are normal without concerns for infectious process. Labs show creatinine of 2 which is stable from prior.  No other significant electrolyte abnormalities.  Plan for symptomatic management and likely discharge home as the patient currently has dementia and desire for comfort care therefore no further cardiac testing or intervention needs to be performed.        ADDITIONAL PROBLEM LIST  Problem #1: Acute chest pain - workup reassuring, HEART score of 5 however patient has a history of dementia and a POLST that states comfort measures only, will plan to discharge back to group home, follow-up with outpatient provider    Problem #2: Chronic kidney disease -appears stable      DISPOSITION AND DISCUSSIONS    Discussion of management with other [facility] or appropriate source(s): Social Work        Escalation of care considered, and ultimately not performed:acute inpatient care management, however at this time, the patient is  most appropriate for outpatient management    Decision tools and prescription drugs considered including, but not limited to: Pain Medications OTC medication should be sufficient .        FINAL DIAGNOSIS  1. Chest pain, unspecified type    2. Chronic kidney disease, unspecified CKD stage

## 2024-11-15 NOTE — DISCHARGE PLANNING
NASRIN spoke to Ayde at [facility] ([phone]). She is not pt's guardian but helps manage his money with [facility]. MSW updated Ayde that pt is return to his group home.

## 2024-11-15 NOTE — ED TRIAGE NOTES
"Chief Complaint   Patient presents with    Chest Pain     Pt c/o non radiating substernal chest pain that began today, hx of GERD and dementia, pt denies SOB, n/v.        BIB [facility] from group home for above complaint. Pt was at [facility] ED two days ago for same complaint, labs at the time were unremarkable. Pt is A/O x 2.     Chest pain protocols placed, Labs drawn, Cardiac, spO2 and BP monitor applied.       /66   Pulse 68   Temp 36.3 °C (97.4 °F) (Temporal)   Resp 14   Ht 1.753 m (5' 9\")   Wt 95.7 kg (211 lb)   SpO2 98%   BMI 31.16 kg/m²      "

## 2024-11-15 NOTE — DISCHARGE PLANNING
MSW spoke to Piper Dooley at [facility]. She is refusing to take pt back and states he needs a higher level of care. Per Piper dooley, pt demands his medications every hour and cannot walk. They also cannot manage his behaviors. Piper Dooley called MSW back and stated they would be able to accept him back with [facility] following.    MSW met with pt. Pt mildly confused but understood he was at a care home and was previously on hospice. Pt agreeable to hospice again for extra care. MSW sent referral to [facility]. MSW spoke with Alisha at [facility]. They can admit pt today around 1400 but need to verify it with Elsi.     MSW spoke to Alisha and confirmed they are all set for d/c back to [facility]. MSW set up [facility] for 1400. MSW updated Piper Dooley on transport time. POLST given to bedside RN.

## 2024-11-15 NOTE — DISCHARGE INSTRUCTIONS
You were seen in the Emergency Department for chest pain.    EKG, labs, chest xray were completed without significant acute abnormalities.    Please use 1,000mg of tylenol every 6 hours as needed for pain.  Continue home medication.    Please follow up with your primary care physician.    Return to the Emergency Department with other concerns.

## 2024-11-15 NOTE — ED NOTES
Med rec complete per group home [facility] [phone]

## 2024-11-15 NOTE — ED NOTES
Discharge instructions reviewed with patient and signed. They verbalized understanding of follow up instructions. All belongings with patient. Bedside report given to ann DO transported back to group home.

## 2024-11-15 NOTE — ED NOTES
Bedside report received from off going RN/tech: Estephanai, assumed care of patient.  POC discussed with patient. Call light within reach, all needs addressed at this time.       Fall risk interventions in place: Move the patient closer to the nurse's station, Patient's personal possessions are with in their safe reach, and Place fall risk sign on patient's door (all applicable per [place] risk assessment)   Continuous monitoring: Cardiac Leads, Pulse Ox, or Blood Pressure  IVF/IV medications: Not Applicable   Oxygen: Room Air  Bedside sitter: Not Applicable   Isolation: Not Applicable

## 2024-11-15 NOTE — ED NOTES
Unable to complete med rec at his time. MAR will be faxed from group [place].     [facility] via phone ([phone])

## 2024-11-15 NOTE — ED NOTES
Pt changed into new brief. Denies further needs/concerns.  Remains on continuous monitoring, call light within reach.

## 2024-11-15 NOTE — ED NOTES
Pt reoriented and placed back on continuous monitoring.   Denies needs/concerns at this time, maida light within reach.

## 2025-03-09 ENCOUNTER — APPOINTMENT (OUTPATIENT)
Dept: RADIOLOGY | Facility: MEDICAL CENTER | Age: 85
End: 2025-03-09
Attending: EMERGENCY MEDICINE
Payer: MEDICARE

## 2025-03-09 ENCOUNTER — HOSPITAL ENCOUNTER (OUTPATIENT)
Facility: MEDICAL CENTER | Age: 85
End: 2025-03-12
Attending: EMERGENCY MEDICINE | Admitting: HOSPITALIST
Payer: MEDICARE

## 2025-03-09 DIAGNOSIS — Z78.9 DNI (DO NOT INTUBATE): ICD-10-CM
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
## 2025-03-09 DIAGNOSIS — E80.6 HYPERBILIRUBINEMIA: ICD-10-CM

## 2025-03-09 DIAGNOSIS — R17 PAINLESS JAUNDICE: ICD-10-CM

## 2025-03-09 DIAGNOSIS — R74.8 ELEVATED LIVER ENZYMES: ICD-10-CM

## 2025-03-09 DIAGNOSIS — Z66 DNR (DO NOT RESUSCITATE): ICD-10-CM

## 2025-03-09 DIAGNOSIS — D69.6 THROMBOCYTOPENIA (HCC): ICD-10-CM

## 2025-03-09 PROBLEM — Z86.73 HISTORY OF STROKE: Status: ACTIVE | Noted: 2022-12-07

## 2025-03-09 LAB
ALBUMIN SERPL BCP-MCNC: 3 G/DL (ref 3.2–4.9)
ALBUMIN/GLOB SERPL: 0.9 G/DL
ALP SERPL-CCNC: 579 U/L (ref 30–99)
ALT SERPL-CCNC: 127 U/L (ref 2–50)
ANION GAP SERPL CALC-SCNC: 9 MMOL/L (ref 7–16)
AST SERPL-CCNC: 122 U/L (ref 12–45)
BASOPHILS # BLD AUTO: 0.5 % (ref 0–1.8)
BASOPHILS # BLD: 0.03 K/UL (ref 0–0.12)
BILIRUB CONJ SERPL-MCNC: 3 MG/DL (ref 0.1–0.5)
BILIRUB INDIRECT SERPL-MCNC: 1.2 MG/DL (ref 0–1)
BILIRUB SERPL-MCNC: 4.2 MG/DL (ref 0.1–1.5)
BUN SERPL-MCNC: 20 MG/DL (ref 8–22)
CALCIUM ALBUM COR SERPL-MCNC: 9.3 MG/DL (ref 8.5–10.5)
CALCIUM SERPL-MCNC: 8.5 MG/DL (ref 8.5–10.5)
CHLORIDE SERPL-SCNC: 103 MMOL/L (ref 96–112)
CO2 SERPL-SCNC: 23 MMOL/L (ref 20–33)
CREAT SERPL-MCNC: 1.26 MG/DL (ref 0.5–1.4)
EOSINOPHIL # BLD AUTO: 0.08 K/UL (ref 0–0.51)
EOSINOPHIL NFR BLD: 1.2 % (ref 0–6.9)
ERYTHROCYTE [DISTWIDTH] IN BLOOD BY AUTOMATED COUNT: 56.2 FL (ref 35.9–50)
GFR SERPLBLD CREATININE-BSD FMLA CKD-EPI: 56 ML/MIN/1.73 M 2
GLOBULIN SER CALC-MCNC: 3.3 G/DL (ref 1.9–3.5)
GLUCOSE SERPL-MCNC: 84 MG/DL (ref 65–99)
HCT VFR BLD AUTO: 31.8 % (ref 42–52)
HGB BLD-MCNC: 10.3 G/DL (ref 14–18)
IMM GRANULOCYTES # BLD AUTO: 0.13 K/UL (ref 0–0.11)
IMM GRANULOCYTES NFR BLD AUTO: 2 % (ref 0–0.9)
INR PPP: 1.06 (ref 0.87–1.13)
LYMPHOCYTES # BLD AUTO: 1.45 K/UL (ref 1–4.8)
LYMPHOCYTES NFR BLD: 22.3 % (ref 22–41)
MAGNESIUM SERPL-MCNC: 2 MG/DL (ref 1.5–2.5)
MCH RBC QN AUTO: 35.2 PG (ref 27–33)
MCHC RBC AUTO-ENTMCNC: 32.4 G/DL (ref 32.3–36.5)
MCV RBC AUTO: 108.5 FL (ref 81.4–97.8)
MONOCYTES # BLD AUTO: 0.7 K/UL (ref 0–0.85)
MONOCYTES NFR BLD AUTO: 10.8 % (ref 0–13.4)
NEUTROPHILS # BLD AUTO: 4.11 K/UL (ref 1.82–7.42)
NEUTROPHILS NFR BLD: 63.2 % (ref 44–72)
NRBC # BLD AUTO: 0 K/UL
NRBC BLD-RTO: 0 /100 WBC (ref 0–0.2)
PLATELET # BLD AUTO: 100 K/UL (ref 164–446)
PLATELET BLD QL SMEAR: NORMAL
PLATELETS.RETICULATED NFR BLD AUTO: 5.5 % (ref 0.6–13.1)
PMV BLD AUTO: 10.8 FL (ref 9–12.9)
POTASSIUM SERPL-SCNC: 3.8 MMOL/L (ref 3.6–5.5)
PROT SERPL-MCNC: 6.3 G/DL (ref 6–8.2)
PROTHROMBIN TIME: 13.8 SEC (ref 12–14.6)
RBC # BLD AUTO: 2.93 M/UL (ref 4.7–6.1)
SODIUM SERPL-SCNC: 135 MMOL/L (ref 135–145)
WBC # BLD AUTO: 6.5 K/UL (ref 4.8–10.8)

## 2025-03-09 PROCEDURE — 85610 PROTHROMBIN TIME: CPT

## 2025-03-09 PROCEDURE — 85055 RETICULATED PLATELET ASSAY: CPT

## 2025-03-09 PROCEDURE — 71260 CT THORAX DX C+: CPT

## 2025-03-09 PROCEDURE — 82248 BILIRUBIN DIRECT: CPT

## 2025-03-09 PROCEDURE — 76705 ECHO EXAM OF ABDOMEN: CPT

## 2025-03-09 PROCEDURE — 99223 1ST HOSP IP/OBS HIGH 75: CPT | Performed by: HOSPITALIST

## 2025-03-09 PROCEDURE — 83735 ASSAY OF MAGNESIUM: CPT

## 2025-03-09 PROCEDURE — 700102 HCHG RX REV CODE 250 W/ 637 OVERRIDE(OP): Mod: UD

## 2025-03-09 PROCEDURE — 36415 COLL VENOUS BLD VENIPUNCTURE: CPT

## 2025-03-09 PROCEDURE — 700102 HCHG RX REV CODE 250 W/ 637 OVERRIDE(OP): Mod: UD | Performed by: HOSPITALIST

## 2025-03-09 PROCEDURE — 80053 COMPREHEN METABOLIC PANEL: CPT

## 2025-03-09 PROCEDURE — A9270 NON-COVERED ITEM OR SERVICE: HCPCS | Mod: UD

## 2025-03-09 PROCEDURE — 85025 COMPLETE CBC W/AUTO DIFF WBC: CPT

## 2025-03-09 PROCEDURE — A9270 NON-COVERED ITEM OR SERVICE: HCPCS | Mod: UD | Performed by: HOSPITALIST

## 2025-03-09 PROCEDURE — G0378 HOSPITAL OBSERVATION PER HR: HCPCS

## 2025-03-09 PROCEDURE — 99285 EMERGENCY DEPT VISIT HI MDM: CPT

## 2025-03-09 PROCEDURE — 700101 HCHG RX REV CODE 250: Mod: UD | Performed by: HOSPITALIST

## 2025-03-09 PROCEDURE — 700117 HCHG RX CONTRAST REV CODE 255: Mod: UD | Performed by: EMERGENCY MEDICINE

## 2025-03-09 PROCEDURE — 73501 X-RAY EXAM HIP UNI 1 VIEW: CPT | Mod: LT

## 2025-03-09 RX ORDER — BENAZEPRIL HYDROCHLORIDE 20 MG/1
10 TABLET ORAL
Status: DISCONTINUED | OUTPATIENT
Start: 2025-03-09 | End: 2025-03-12 | Stop reason: HOSPADM

## 2025-03-09 RX ORDER — LORAZEPAM 0.5 MG/1
0.5 TABLET ORAL 4 TIMES DAILY PRN
Status: DISCONTINUED | OUTPATIENT
Start: 2025-03-09 | End: 2025-03-12 | Stop reason: HOSPADM

## 2025-03-09 RX ORDER — DIVALPROEX SODIUM 250 MG/1
250 TABLET, DELAYED RELEASE ORAL 3 TIMES DAILY
Status: DISCONTINUED | OUTPATIENT
Start: 2025-03-09 | End: 2025-03-12 | Stop reason: HOSPADM

## 2025-03-09 RX ORDER — ASPIRIN 81 MG/1
81 TABLET ORAL DAILY
Status: DISCONTINUED | OUTPATIENT
Start: 2025-03-10 | End: 2025-03-12 | Stop reason: HOSPADM

## 2025-03-09 RX ORDER — BUSPIRONE HYDROCHLORIDE 5 MG/1
5 TABLET ORAL 3 TIMES DAILY
COMMUNITY

## 2025-03-09 RX ORDER — AMOXICILLIN 250 MG
2 CAPSULE ORAL EVERY EVENING
Status: DISCONTINUED | OUTPATIENT
Start: 2025-03-09 | End: 2025-03-12 | Stop reason: HOSPADM

## 2025-03-09 RX ORDER — OXYCODONE HYDROCHLORIDE 5 MG/1
5 TABLET ORAL
Refills: 0 | Status: DISCONTINUED | OUTPATIENT
Start: 2025-03-09 | End: 2025-03-11

## 2025-03-09 RX ORDER — ONDANSETRON 4 MG/1
4 TABLET, FILM COATED ORAL EVERY 4 HOURS PRN
COMMUNITY

## 2025-03-09 RX ORDER — QUETIAPINE FUMARATE 100 MG/1
300 TABLET, FILM COATED ORAL
Status: DISCONTINUED | OUTPATIENT
Start: 2025-03-09 | End: 2025-03-12 | Stop reason: HOSPADM

## 2025-03-09 RX ORDER — TRIAMCINOLONE ACETONIDE 1 MG/G
1 CREAM TOPICAL 2 TIMES DAILY
COMMUNITY

## 2025-03-09 RX ORDER — TRIAMCINOLONE ACETONIDE 1 MG/G
1 CREAM TOPICAL 2 TIMES DAILY
Status: DISCONTINUED | OUTPATIENT
Start: 2025-03-09 | End: 2025-03-12 | Stop reason: HOSPADM

## 2025-03-09 RX ORDER — TAMSULOSIN HYDROCHLORIDE 0.4 MG/1
0.4 CAPSULE ORAL DAILY
Status: DISCONTINUED | OUTPATIENT
Start: 2025-03-10 | End: 2025-03-12 | Stop reason: HOSPADM

## 2025-03-09 RX ORDER — LORAZEPAM 0.5 MG/1
0.5 TABLET ORAL 4 TIMES DAILY PRN
COMMUNITY

## 2025-03-09 RX ORDER — OXYCODONE HYDROCHLORIDE 5 MG/1
2.5 TABLET ORAL
Refills: 0 | Status: DISCONTINUED | OUTPATIENT
Start: 2025-03-09 | End: 2025-03-11

## 2025-03-09 RX ORDER — HYDROMORPHONE HYDROCHLORIDE 1 MG/ML
0.25 INJECTION, SOLUTION INTRAMUSCULAR; INTRAVENOUS; SUBCUTANEOUS
Status: DISCONTINUED | OUTPATIENT
Start: 2025-03-09 | End: 2025-03-11

## 2025-03-09 RX ORDER — BUSPIRONE HYDROCHLORIDE 10 MG/1
5 TABLET ORAL 3 TIMES DAILY
Status: DISCONTINUED | OUTPATIENT
Start: 2025-03-09 | End: 2025-03-11

## 2025-03-09 RX ORDER — ACETAMINOPHEN 325 MG/1
650 TABLET ORAL EVERY 6 HOURS PRN
Status: DISCONTINUED | OUTPATIENT
Start: 2025-03-09 | End: 2025-03-12 | Stop reason: HOSPADM

## 2025-03-09 RX ORDER — OMEPRAZOLE 20 MG/1
40 CAPSULE, DELAYED RELEASE ORAL 2 TIMES DAILY
Status: DISCONTINUED | OUTPATIENT
Start: 2025-03-09 | End: 2025-03-12 | Stop reason: HOSPADM

## 2025-03-09 RX ORDER — LIDOCAINE 4 G/G
1 PATCH TOPICAL
Status: DISCONTINUED | OUTPATIENT
Start: 2025-03-09 | End: 2025-03-12 | Stop reason: HOSPADM

## 2025-03-09 RX ORDER — POLYETHYLENE GLYCOL 3350 17 G/17G
1 POWDER, FOR SOLUTION ORAL
Status: DISCONTINUED | OUTPATIENT
Start: 2025-03-09 | End: 2025-03-12 | Stop reason: HOSPADM

## 2025-03-09 RX ORDER — AMOXICILLIN 250 MG
1 CAPSULE ORAL 2 TIMES DAILY
COMMUNITY

## 2025-03-09 RX ORDER — LEVOTHYROXINE SODIUM 88 UG/1
88 TABLET ORAL
Status: DISCONTINUED | OUTPATIENT
Start: 2025-03-10 | End: 2025-03-12 | Stop reason: HOSPADM

## 2025-03-09 RX ADMIN — SENNOSIDES AND DOCUSATE SODIUM 2 TABLET: 50; 8.6 TABLET ORAL at 17:48

## 2025-03-09 RX ADMIN — BUSPIRONE HYDROCHLORIDE 5 MG: 5 TABLET ORAL at 15:57

## 2025-03-09 RX ADMIN — POLYETHYLENE GLYCOL 3350 1 PACKET: 17 POWDER, FOR SOLUTION ORAL at 17:48

## 2025-03-09 RX ADMIN — Medication 10 MG: at 22:01

## 2025-03-09 RX ADMIN — ACETAMINOPHEN 650 MG: 325 TABLET ORAL at 17:48

## 2025-03-09 RX ADMIN — IOHEXOL 100 ML: 350 INJECTION, SOLUTION INTRAVENOUS at 13:15

## 2025-03-09 RX ADMIN — OXYCODONE 5 MG: 5 TABLET ORAL at 20:16

## 2025-03-09 RX ADMIN — LIDOCAINE 1 PATCH: 4 PATCH TOPICAL at 17:49

## 2025-03-09 RX ADMIN — OMEPRAZOLE 40 MG: 20 CAPSULE, DELAYED RELEASE ORAL at 22:01

## 2025-03-09 RX ADMIN — QUETIAPINE FUMARATE 300 MG: 100 TABLET ORAL at 22:05

## 2025-03-09 RX ADMIN — DIVALPROEX SODIUM 250 MG: 250 TABLET, DELAYED RELEASE ORAL at 15:57

## 2025-03-09 RX ADMIN — DIVALPROEX SODIUM 250 MG: 250 TABLET, DELAYED RELEASE ORAL at 22:01

## 2025-03-09 RX ADMIN — LORAZEPAM 0.5 MG: 1 TABLET ORAL at 17:46

## 2025-03-09 SDOH — ECONOMIC STABILITY: TRANSPORTATION INSECURITY
IN THE PAST 12 MONTHS, HAS LACK OF RELIABLE TRANSPORTATION KEPT YOU FROM MEDICAL APPOINTMENTS, MEETINGS, WORK OR FROM GETTING THINGS NEEDED FOR DAILY LIVING?: NO

## 2025-03-09 SDOH — ECONOMIC STABILITY: TRANSPORTATION INSECURITY
IN THE PAST 12 MONTHS, HAS THE LACK OF TRANSPORTATION KEPT YOU FROM MEDICAL APPOINTMENTS OR FROM GETTING MEDICATIONS?: NO

## 2025-03-09 ASSESSMENT — ENCOUNTER SYMPTOMS
HEMOPTYSIS: 0
PALPITATIONS: 0
MYALGIAS: 0
PND: 0
WHEEZING: 0
DOUBLE VISION: 0
NAUSEA: 0
DIZZINESS: 0
BLURRED VISION: 0
FEVER: 0
BACK PAIN: 0
BRUISES/BLEEDS EASILY: 0
COUGH: 0
VOMITING: 0
DEPRESSION: 0
HEARTBURN: 0
CLAUDICATION: 0
CHILLS: 0
HEADACHES: 0

## 2025-03-09 ASSESSMENT — COGNITIVE AND FUNCTIONAL STATUS - GENERAL
DRESSING REGULAR UPPER BODY CLOTHING: A LITTLE
HELP NEEDED FOR BATHING: A LOT
MOVING TO AND FROM BED TO CHAIR: TOTAL
STANDING UP FROM CHAIR USING ARMS: TOTAL
MOBILITY SCORE: 8
SUGGESTED CMS G CODE MODIFIER MOBILITY: CM
CLIMB 3 TO 5 STEPS WITH RAILING: TOTAL
TURNING FROM BACK TO SIDE WHILE IN FLAT BAD: A LOT
SUGGESTED CMS G CODE MODIFIER DAILY ACTIVITY: CK
MOVING FROM LYING ON BACK TO SITTING ON SIDE OF FLAT BED: A LOT
TOILETING: A LOT
WALKING IN HOSPITAL ROOM: TOTAL
DRESSING REGULAR LOWER BODY CLOTHING: A LITTLE
DAILY ACTIVITIY SCORE: 18

## 2025-03-09 ASSESSMENT — LIFESTYLE VARIABLES
AVERAGE NUMBER OF DAYS PER WEEK YOU HAVE A DRINK CONTAINING ALCOHOL: 0
TOTAL SCORE: 0
ON A TYPICAL DAY WHEN YOU DRINK ALCOHOL HOW MANY DRINKS DO YOU HAVE: 0
EVER FELT BAD OR GUILTY ABOUT YOUR DRINKING: NO
HAVE PEOPLE ANNOYED YOU BY CRITICIZING YOUR DRINKING: NO
CONSUMPTION TOTAL: NEGATIVE
HOW MANY TIMES IN THE PAST YEAR HAVE YOU HAD 5 OR MORE DRINKS IN A DAY: 0
HAVE YOU EVER FELT YOU SHOULD CUT DOWN ON YOUR DRINKING: NO
DOES PATIENT WANT TO STOP DRINKING: NO
TOTAL SCORE: 0
EVER HAD A DRINK FIRST THING IN THE MORNING TO STEADY YOUR NERVES TO GET RID OF A HANGOVER: NO
EVER_SMOKED: YES
TOTAL SCORE: 0
ALCOHOL_USE: NO

## 2025-03-09 ASSESSMENT — SOCIAL DETERMINANTS OF HEALTH (SDOH)
WITHIN THE LAST YEAR, HAVE YOU BEEN HUMILIATED OR EMOTIONALLY ABUSED IN OTHER WAYS BY YOUR PARTNER OR EX-PARTNER?: PATIENT DECLINED
WITHIN THE PAST 12 MONTHS, YOU WORRIED THAT YOUR FOOD WOULD RUN OUT BEFORE YOU GOT THE MONEY TO BUY MORE: NEVER TRUE
WITHIN THE LAST YEAR, HAVE TO BEEN RAPED OR FORCED TO HAVE ANY KIND OF SEXUAL ACTIVITY BY YOUR PARTNER OR EX-PARTNER?: PATIENT DECLINED
WITHIN THE PAST 12 MONTHS, THE FOOD YOU BOUGHT JUST DIDN'T LAST AND YOU DIDN'T HAVE MONEY TO GET MORE: NEVER TRUE
WITHIN THE LAST YEAR, HAVE YOU BEEN KICKED, HIT, SLAPPED, OR OTHERWISE PHYSICALLY HURT BY YOUR PARTNER OR EX-PARTNER?: PATIENT DECLINED
WITHIN THE LAST YEAR, HAVE YOU BEEN AFRAID OF YOUR PARTNER OR EX-PARTNER?: PATIENT DECLINED
IN THE PAST 12 MONTHS, HAS THE ELECTRIC, GAS, OIL, OR WATER COMPANY THREATENED TO SHUT OFF SERVICE IN YOUR HOME?: NO

## 2025-03-09 ASSESSMENT — PATIENT HEALTH QUESTIONNAIRE - PHQ9
SUM OF ALL RESPONSES TO PHQ9 QUESTIONS 1 AND 2: 0
2. FEELING DOWN, DEPRESSED, IRRITABLE, OR HOPELESS: NOT AT ALL
1. LITTLE INTEREST OR PLEASURE IN DOING THINGS: NOT AT ALL

## 2025-03-09 ASSESSMENT — COPD QUESTIONNAIRES
COPD SCREENING SCORE: 4
HAVE YOU SMOKED AT LEAST 100 CIGARETTES IN YOUR ENTIRE LIFE: YES
DURING THE PAST 4 WEEKS HOW MUCH DID YOU FEEL SHORT OF BREATH: NONE/LITTLE OF THE TIME
DO YOU EVER COUGH UP ANY MUCUS OR PHLEGM?: NO/ONLY WITH OCCASIONAL COLDS OR INFECTIONS
HAVE YOU SMOKED AT LEAST 100 CIGARETTES IN YOUR ENTIRE LIFE: YES

## 2025-03-09 ASSESSMENT — FIBROSIS 4 INDEX
FIB4 SCORE: 9.09
FIB4 SCORE: 4.22

## 2025-03-09 ASSESSMENT — PAIN DESCRIPTION - PAIN TYPE
TYPE: ACUTE PAIN

## 2025-03-09 NOTE — H&P
Hospital Medicine History & Physical Note    Date of Service  3/9/2025    Primary Care Physician  Nuha Ware M.D.    Consultants      Code Status  DNAR/DNI    Chief Complaint  Chief Complaint   Patient presents with    Fall     Pt reports a mechanical glf. Reporting left hip, left knee, and right elbow pain. -head strike, -thinners       History of Presenting Illness  Sina Lewis is a 84 y.o. male who presented 3/9/2025 with past medical history of stroke, hypertension, heart failure, diverticulosis, hypothyroidism, is coming today from group home patient apparently had a mechanical fall he was sitting at the edge of the bathroom and he slipped off, he denies any head injury but he complaining of left hip and left knee right shoulder pain, patient in emergency room had initial workup that did not show any acute findings like fractures, patient has multiple skin tears which are superficial and they were covered in emergency room, patient has history of dementia but he is able to speak in full sentences and gives on information although has very severe memory impairment, he notes that he lives here in [place] but he does not know exactly where he knows that he is in a facility/group home, patient has a niece is also helping him and he watching every day, patient was noticed to be jaundiced, bilirubin is elevated, patient stated that he has noticed a mass in his abdomen on palpation but has not had any recent imaging studies, patient was here in October last year for possible acute cholecystitis, patient had HIDA scan that was negative and general surgery recommended medical management at that time, CT abdomen in October 2024 showed no acute findings, cholelithiasis, no findings on biliary system or liver or spleen or pancreas, patient at this time he is hemodynamically stable he is going to be admitted, I have discussed with ER physician and we are getting ultrasound abdomen and CT with contrast to assess  for causes of painless jaundice and again since patient is complaining of a lump in his abdomen.        Review of Systems  Review of Systems   Constitutional:  Negative for chills and fever.   HENT:  Negative for nosebleeds.    Eyes:  Negative for blurred vision and double vision.   Respiratory:  Negative for cough, hemoptysis and wheezing.    Cardiovascular:  Negative for chest pain, palpitations, claudication, leg swelling and PND.   Gastrointestinal:  Negative for heartburn, nausea and vomiting.   Genitourinary:  Negative for hematuria and urgency.   Musculoskeletal:  Negative for back pain and myalgias.   Skin:  Negative for itching and rash.   Neurological:  Negative for dizziness and headaches.   Endo/Heme/Allergies:  Does not bruise/bleed easily.   Psychiatric/Behavioral:  Negative for depression.        Past Medical History   has a past medical history of Abdominal pain, bilateral lower quadrant, Acute MI (HCC), Arrhythmia, Arthritis, Backpain, Bronchitis, Congestive heart failure (HCC), Diverticulitis, Heart murmur, Hypertension, Pneumonia, Psychiatric problem, and Thyroid disorder.    Surgical History   has a past surgical history that includes other cardiac surgery and hernia repair.     Family History    Family history reviewed with patient. There is no family history that is pertinent to the chief complaint.     Social History   reports that he quit smoking about 60 years ago. His smoking use included cigarettes. He has never used smokeless tobacco. He reports current alcohol use. He reports that he does not use drugs.    Allergies  Allergies   Allergen Reactions    Nkda [No Known Drug Allergy]        Medications  Prior to Admission Medications   Prescriptions Last Dose Informant Patient Reported? Taking?   Dextromethorphan Polistirex ER (DELSYM) 30 MG/5ML Suspension Extended Release Unknown MAR from Other Facility Yes No   Sig: Take 30 mg by mouth every 8 hours as needed for Cough.   LORazepam (ATIVAN)  0.5 MG Tab Unknown MAR from Other Facility Yes No   Sig: Take 0.5 mg by mouth 4 times a day as needed for Anxiety.   Melatonin 10 MG Tab 3/8/2025 at  8:00 PM MAR from Other Facility Yes Yes   Sig: Take 10 mg by mouth at bedtime.   acetaminophen (TYLENOL) 500 MG Tab 3/2/2025 at  7:00 AM MAR from Other Facility Yes No   Sig: Take 500 mg by mouth every 6 hours as needed for Mild Pain.   aspirin 81 MG EC tablet 3/9/2025 at  8:00 AM MAR from Other Facility Yes Yes   Sig: Take 81 mg by mouth every day.   benazepril (LOTENSIN) 10 MG Tab 3/8/2025 at  8:00 PM MAR from Other Facility Yes Yes   Sig: Take 10 mg by mouth at bedtime.   busPIRone (BUSPAR) 5 MG tablet 3/9/2025 at  8:00 AM MAR from Other Facility Yes Yes   Sig: Take 5 mg by mouth 3 times a day.   divalproex (DEPAKOTE) 250 MG Tablet Delayed Response 3/9/2025 at  8:00 AM MAR from Other Facility Yes Yes   Sig: Take 250 mg by mouth 3 times a day.   levothyroxine (SYNTHROID) 88 MCG Tab 3/9/2025 at  7:00 AM MAR from Other Facility Yes Yes   Sig: Take 88 mcg by mouth every morning on an empty stomach.   lidocaine (ASPERCREME LIDOCAINE) 4 % Patch Unknown MAR from Other Facility Yes No   Sig: Place 1 Patch on the skin 1 time a day as needed.   magnesium hydroxide (MILK OF MAGNESIA) 400 MG/5ML Suspension Unknown MAR from Other Facility Yes No   Sig: Take 30 mL by mouth 1 time a day as needed (no BM for 3 days).   omeprazole (PRILOSEC) 40 MG delayed-release capsule 3/9/2025 at  8:00 AM MAR from Other Facility Yes Yes   Sig: Take 40 mg by mouth 2 times a day.   ondansetron (ZOFRAN) 4 MG Tab tablet Unknown MAR from Other Facility Yes No   Sig: Take 4 mg by mouth every four hours as needed for Nausea/Vomiting.   quetiapine (SEROQUEL) 300 MG tablet 3/8/2025 at  8:00 PM MAR from Other Facility Yes Yes   Sig: Take 300 mg by mouth at bedtime.   senna-docusate (PERICOLACE OR SENOKOT S) 8.6-50 MG Tab 3/9/2025 at  8:00 AM MAR from Other Facility Yes Yes   Sig: Take 1 Tablet by mouth 2  times a day. Hold for loose stools   sennosides (SENOKOT) 8.6 MG Tab 3/3/2025 at  8:00 AM MAR from Other Facility Yes No   Sig: Take 8.6 mg by mouth 2 times a day as needed (constipation). DISCONTINUED BY PROVIDER  Indications: Constipation   tamsulosin (FLOMAX) 0.4 MG capsule 3/9/2025 at  8:00 AM MAR from Other Facility Yes Yes   Sig: Take 0.4 mg by mouth every day.   triamcinolone acetonide (KENALOG) 0.1 % Cream 3/9/2025 at  8:00 AM MAR from Other Facility Yes Yes   Sig: Apply 1 Application topically 2 times a day. For rashes      Facility-Administered Medications: None       Physical Exam  Temp:  [36.3 °C (97.3 °F)] 36.3 °C (97.3 °F)  Pulse:  [72-76] 74  Resp:  [15-18] 15  BP: (122-143)/(63-64) 131/63  SpO2:  [95 %-97 %] 97 %  Blood Pressure : 131/63   Temperature: 36.3 °C (97.3 °F)   Pulse: 74   Respiration: 15   Pulse Oximetry: 97 %       Physical Exam  Vitals and nursing note reviewed.   Constitutional:       Appearance: He is not ill-appearing.   HENT:      Mouth/Throat:      Mouth: Mucous membranes are moist.   Eyes:      General: Scleral icterus present.      Conjunctiva/sclera: Conjunctivae normal.      Pupils: Pupils are equal, round, and reactive to light.   Cardiovascular:      Rate and Rhythm: Normal rate and regular rhythm.      Pulses: Normal pulses.      Heart sounds: Normal heart sounds.   Pulmonary:      Effort: Pulmonary effort is normal.      Breath sounds: Normal breath sounds.   Abdominal:      General: Bowel sounds are normal. There is no distension.      Palpations: There is mass (May be a small mass in the epigastric area/mid abdomen).      Tenderness: There is no abdominal tenderness. There is no guarding.   Musculoskeletal:         General: Normal range of motion.      Cervical back: Normal range of motion and neck supple.      Right lower leg: No edema.      Left lower leg: No edema.   Skin:     Capillary Refill: Capillary refill takes less than 2 seconds.      Coloration: Skin is  "jaundiced.   Neurological:      General: No focal deficit present.      Mental Status: He is alert.      Sensory: No sensory deficit.      Comments: Patient is partially oriented, he is able to follow commands and answer questions   Psychiatric:         Mood and Affect: Mood normal.         Laboratory:  Recent Labs     03/09/25  0950   WBC 6.5   RBC 2.93*   HEMOGLOBIN 10.3*   HEMATOCRIT 31.8*   .5*   MCH 35.2*   MCHC 32.4   RDW 56.2*   PLATELETCT 100*   MPV 10.8     Recent Labs     03/09/25  0950   SODIUM 135   POTASSIUM 3.8   CHLORIDE 103   CO2 23   GLUCOSE 84   BUN 20   CREATININE 1.26   CALCIUM 8.5     Recent Labs     03/09/25  0950   ALTSGPT 127*   ASTSGOT 122*   ALKPHOSPHAT 579*   TBILIRUBIN 4.2*   GLUCOSE 84     Recent Labs     03/09/25  1030   INR 1.06     No results for input(s): \"NTPROBNP\" in the last 72 hours.      No results for input(s): \"TROPONINT\" in the last 72 hours.    Imaging:  DX-HIP-UNILATERAL-WITH PELVIS-1 VIEW LEFT   Final Result         1. No acute processes.   2. Left JACKIE is unremarkable.   3. Osteopenia.      US-RUQ    (Results Pending)   CT-CHEST,ABDOMEN,PELVIS WITH    (Results Pending)       Hip x-ray reviewed, no fractures    Assessment/Plan:  Justification for Admission Status  I anticipate this patient is appropriate for observation status at this time because patient will require evaluation for jaundice, probably intra-abdominal mass causing obstruction, follow-up CT abdomen and ultrasound abdomen might require surgical oncology evaluation/GI        * Jaundice- (present on admission)  Assessment & Plan  Painless jaundice  F/u US and CT abdomen  Patient c/o mass in the abdomen.     Elevated LFTs- (present on admission)  Assessment & Plan  US and CT abdomen pending  No abdominal pain  Was seen 10/2024 for possible acute rickie but hida scan was neg and gen surgery recommended no intervention.       Dementia (HCC)- (present on admission)  Assessment & Plan  At baseline  Able to " answer questions.   No agitation.       History of stroke- (present on admission)  Assessment & Plan  No new neuro deficit.  Continue monitoring.     Frequent falls- (present on admission)  Assessment & Plan  Had a mechanical fall today  As per patient he slip from the edge of the bathroom  No head trauma,has superficial skin tears treated in the ER.     Diverticulosis- (present on admission)  Assessment & Plan  No abdominal pain  Monitoring  No h/o bleeding.     Hypothyroidism- (present on admission)  Assessment & Plan  Continue supplement.     CAD (coronary artery disease)- (present on admission)  Assessment & Plan  No chest pain   Continue monitoring  Continue home medications.     Hyperlipidemia- (present on admission)  Assessment & Plan  Fu lipid panel in am.     HTN (hypertension)- (present on admission)  Assessment & Plan  Continue home meds        VTE prophylaxis: SCDs/TEDs          I reviewed CBC  I have reviewed CMP  I had review hip x-ray  Discussed with ER physician  I have discussed with nurse staff  I have reviewed current and old medical records  I placed order to admit patient to hospital for observation  I have ordered CBC CMP magnesium.  Concern for possible malignancy  Patient might require GI/surgical oncology evaluation  N.p.o. waiting for ultrasound      Patient is being admitted in guarded condition

## 2025-03-09 NOTE — ASSESSMENT & PLAN NOTE
Painless jaundice likely secondary to stone that has since passed as laboratory is starting to improve  RUQ US showing cholelithiasis with no evidence of cholecystitis  CT abdomen/pelvis showing cholelithiasis with no intrahepatic or extrahepatic biliary dilation  Trend CMP in the am

## 2025-03-09 NOTE — CARE PLAN
The patient is Stable - Low risk of patient condition declining or worsening         Progress made toward(s) clinical / shift goals:    Problem: Pain - Standard  Goal: Alleviation of pain or a reduction in pain to the patient’s comfort goal  Description: Target End Date:  Prior to discharge or change in level of care    Document on Vitals flowsheet    1.  Document pain using the appropriate pain scale per order or unit policy  2.  Educate and implement non-pharmacologic comfort measures (i.e. relaxation, distraction, massage, cold/heat therapy, etc.)  3.  Pain management medications as ordered  4.  Reassess pain after pain med administration per policy  5.  If opiods administered assess patient's response to pain medication is appropriate per POSS sedation scale  6.  Follow pain management plan developed in collaboration with patient and interdisciplinary team (including palliative care or pain specialists if applicable)  Outcome: Progressing     Problem: Knowledge Deficit - Standard  Goal: Patient and family/care givers will demonstrate understanding of plan of care, disease process/condition, diagnostic tests and medications  Description: Target End Date:  1-3 days or as soon as patient condition allows    Document in Patient Education    1.  Patient and family/caregiver oriented to unit, equipment, visitation policy and means for communicating concern  2.  Complete/review Learning Assessment  3.  Assess knowledge level of disease process/condition, treatment plan, diagnostic tests and medications  4.  Explain disease process/condition, treatment plan, diagnostic tests and medications  Outcome: Progressing     Problem: Skin Integrity  Goal: Skin integrity is maintained or improved  Description: Target End Date:  Prior to discharge or change in level of care    Document interventions on Skin Risk/Stan flowsheet groups and corresponding LDA    1.  Assess and monitor skin integrity, appearance and/or  temperature  2.  Assess risk factors for impaired skin integrity and/or pressures ulcers  3.  Implement precautions to protect skin integrity in collaboration with interdisciplinary team  4.  Implement pressure ulcer prevention protocol if at risk for skin breakdown  5.  Confirm wound care consult if at risk for skin breakdown  6.  Ensure patient use of pressure relieving devices  (Low air loss bed, waffle overlay, heel protectors, ROHO cushion, etc)  Outcome: Progressing     Problem: Fall Risk  Goal: Patient will remain free from falls  Description: Target End Date:  Prior to discharge or change in level of care    Document interventions on the [facility] Fall Risk Assessment    1.  Assess for fall risk factors  2.  Implement fall precautions  Outcome: Progressing       Patient is not progressing towards the following goals:

## 2025-03-09 NOTE — ED NOTES
Med Rec complete per MAR from [facility]    Allergies reviewed  Antibiotics in the past 30 days:no  Anticoagulant in past 14 days:no  Pharmacy patient utilizes:[facility]    Caregiver #[phone]

## 2025-03-09 NOTE — PROGRESS NOTES
4 Eyes Skin Assessment Completed by JANINE Jin and JANINE Martin.    Head WDL  Ears WDL  Nose WDL  Mouth WDL  Neck WDL  Breast/Chest WDL  Shoulder Blades WDL  Spine WDL  (R) Arm/Elbow/Hand Redness and Abrasion Elbow  (L) Arm/Elbow/Hand Bruising  Abdomen WDL  Groin WDL  Scrotum/Coccyx/Buttocks WDL  (R) Leg WDL  (L) Leg Redness, Scab, and Bruising  (R) Heel/Foot/Toe WDL  (L) Heel/Foot/Toe Calloused Heel          Devices In Places Blood Pressure Cuff and Pulse Ox      Interventions In Place Pillows    Possible Skin Injury No    Pictures Uploaded Into Epic N/A  Wound Consult Placed N/A  RN Wound Prevention Protocol Ordered No

## 2025-03-09 NOTE — ED PROVIDER NOTES
ED Provider Note    CHIEF COMPLAINT  Chief Complaint   Patient presents with    Fall     Pt reports a mechanical glf. Reporting left hip, left knee, and right elbow pain. -head strike, -thinners       EXTERNAL RECORDS REVIEWED  Patient's last encounter was an ED visit in November of last year he was seen for chest pain, chronic kidney disease history of dementia.  There is no note of discoloration of the skin at the time.    Patient was admitted to the hospital most recently last year.  Presented October 23, discharged November 2.  He was seen for bilateral pneumonia and sepsis.  Noted at that time, to be a DNR/DNI.  Other history includes hypercholesterolemia, dementia, hypertension, CAD.  Patient noted to be a poor historian and ANO x 2 at baseline.  Right upper quadrant ultrasound showed elevated LFTs.    Most recent labs show a low platelet count 152 and a creatinine of 2.06.  Total bilirubin 1.3.    HPI/ROS  LIMITATION TO HISTORY   Select: dementia  OUTSIDE HISTORIAN(S):  EMS run record noted blood pressure 118/56, pulse 84, respiratory rate 18, pulse ox 98% on room air, ANO x 3, GCS 14, blood sugar 178.  Patient presents from a group [place], [place].    Sina Lewis is a 84 y.o. male who presents via EMS.  Patient reports a fall.  He complains of right elbow pain where he sustained a skin tear.  He complains of left hip pain, left knee pain.  He has normal range of motion.  He does not appear to be on anticoagulation.  He does have a history of dementia.    PAST MEDICAL HISTORY   has a past medical history of Abdominal pain, bilateral lower quadrant, Acute MI (HCC), Arrhythmia, Arthritis, Backpain, Bronchitis, Congestive heart failure (HCC), Diverticulitis, Heart murmur, Hypertension, Pneumonia, Psychiatric problem, and Thyroid disorder.  Dementia  Atrial Fib  DNR/DNI    SURGICAL HISTORY   has a past surgical history that includes other cardiac surgery and hernia repair.    FAMILY  "HISTORY  History reviewed. No pertinent family history.    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     Sexual activity: Not on file       CURRENT MEDICATIONS  Home Medications       Reviewed by Oly Pruitt (Pharmacy Tech) on 25 at 1058  Med List Status: Unable to Obtain     Medication Last Dose Status   acetaminophen (TYLENOL) 500 MG Tab  Active   aspirin 81 MG EC tablet  Active   benazepril (LOTENSIN) 20 MG Tab  Active   Dextromethorphan Polistirex ER (DELSYM) 30 MG/5ML Suspension Extended Release  Active   divalproex (DEPAKOTE) 250 MG Tablet Delayed Response  Active   hydrOXYzine HCl (ATARAX) 25 MG Tab  Active   levothyroxine (SYNTHROID) 88 MCG Tab  Active   lidocaine (ASPERCREME LIDOCAINE) 4 % Patch  Active   Melatonin 10 MG Tab  Active   morphine 100 MG/5ML Solution  Active   omeprazole (PRILOSEC) 40 MG delayed-release capsule  Active   QUEtiapine (SEROQUEL) 200 MG Tab  Active   sennosides (SENOKOT) 8.6 MG Tab  Active   tamsulosin (FLOMAX) 0.4 MG capsule  Active                  Audit from Redirected Encounters    **Home medications have not yet been reviewed for this encounter**         ALLERGIES  Allergies   Allergen Reactions    Nkda [No Known Drug Allergy]        PHYSICAL EXAM  VITAL SIGNS: /63   Pulse 74   Temp 36.3 °C (97.3 °F) (Temporal)   Resp 15   Ht 1.753 m (5' 9\")   Wt 90.7 kg (200 lb)   SpO2 97%   BMI 29.53 kg/m²    Vitals reviewed.  Constitutional: Patient is oriented to person, place, and time.  Chronically ill-appearing. No distress.    Head: Normocephalic and atraumatic.   Ears: Normal external ears bilaterally. "   Mouth/Throat: Oropharynx is clear.   Eyes: Conjunctivae are icteric. Pupils are equal, round, and reactive to light.   Neck: Normal range of motion. Neck supple.   Cardiovascular: Normal rate, regular rhythm and normal heart sounds. Normal peripheral pulses.  Pulmonary/Chest: Effort normal and breath sounds normal. No respiratory distress, no wheezes, rhonchi, or rales. No chest wall tenderness.  Abdominal: Soft. Bowel sounds are normal. There is no tenderness, rebound or guarding, or peritoneal signs. No CVA tenderness.  Musculoskeletal: No edema and no tenderness.  No shortening or rotation.  Normal range of motion of bilateral upper and lower extremities.  Neurological: No cranial nerve deficits. Normal motor and sensory exam. No focal deficits. Gait not tested  Skin: Skin is warm and dry. No erythema. No pallor. Jaundiced skin. Skin tear right elbow  Psychiatric: Patient has a normal mood and affect.     EKG/LABS  Results for orders placed or performed during the hospital encounter of 03/09/25   CMP    Collection Time: 03/09/25  9:50 AM   Result Value Ref Range    Sodium 135 135 - 145 mmol/L    Potassium 3.8 3.6 - 5.5 mmol/L    Chloride 103 96 - 112 mmol/L    Co2 23 20 - 33 mmol/L    Anion Gap 9.0 7.0 - 16.0    Glucose 84 65 - 99 mg/dL    Bun 20 8 - 22 mg/dL    Creatinine 1.26 0.50 - 1.40 mg/dL    Calcium 8.5 8.5 - 10.5 mg/dL    Correct Calcium 9.3 8.5 - 10.5 mg/dL    AST(SGOT) 122 (H) 12 - 45 U/L    ALT(SGPT) 127 (H) 2 - 50 U/L    Alkaline Phosphatase 579 (H) 30 - 99 U/L    Total Bilirubin 4.2 (H) 0.1 - 1.5 mg/dL    Albumin 3.0 (L) 3.2 - 4.9 g/dL    Total Protein 6.3 6.0 - 8.2 g/dL    Globulin 3.3 1.9 - 3.5 g/dL    A-G Ratio 0.9 g/dL   CBC WITH DIFFERENTIAL    Collection Time: 03/09/25  9:50 AM   Result Value Ref Range    WBC 6.5 4.8 - 10.8 K/uL    RBC 2.93 (L) 4.70 - 6.10 M/uL    Hemoglobin 10.3 (L) 14.0 - 18.0 g/dL    Hematocrit 31.8 (L) 42.0 - 52.0 %    .5 (H) 81.4 - 97.8 fL    MCH 35.2 (H) 27.0 -  33.0 pg    MCHC 32.4 32.3 - 36.5 g/dL    RDW 56.2 (H) 35.9 - 50.0 fL    Platelet Count 100 (L) 164 - 446 K/uL    MPV 10.8 9.0 - 12.9 fL    Nucleated RBC 0.00 0.00 - 0.20 /100 WBC    NRBC (Absolute) 0.00 K/uL   ESTIMATED GFR    Collection Time: 03/09/25  9:50 AM   Result Value Ref Range    GFR (CKD-EPI) 56 (A) >60 mL/min/1.73 m 2   Prothrombin Time    Collection Time: 03/09/25 10:30 AM   Result Value Ref Range    PT 13.8 12.0 - 14.6 sec    INR 1.06 0.87 - 1.13     I have independently interpreted this EKG    RADIOLOGY/PROCEDURES   I have independently interpreted the diagnostic imaging associated with this visit and am waiting the final reading from the radiologist.   My preliminary interpretation is as follows: No fracture/dislocation. Total hip hardware in place.    Radiologist interpretation:  DX-HIP-UNILATERAL-WITH PELVIS-1 VIEW LEFT   Final Result         1. No acute processes.   2. Left JACKIE is unremarkable.   3. Osteopenia.      US-RUQ    (Results Pending)   CT-CHEST,ABDOMEN,PELVIS WITH    (Results Pending)       COURSE & MEDICAL DECISION MAKING    ASSESSMENT, COURSE AND PLAN  Care Narrative:     This is a pleasant elderly male, 84 years old.  He presents after ground-level fall.  He presents from a group home where he resides.  There was no report of loss of consciousness.  He is not apparently on anticoagulation.  He did just sustain a skin tear to his right elbow.  There is hemostasis.  He has normal range of motion of extremities x 4, complaining of left hip pain.  He tells me he was able to walk after he fell.  Interestingly, patient appears quite jaundiced with scleral icterus and I see no record in his chart that this is chronic or known.  Patient was also unaware of this. I have ordered IV start, lab evaluation.  He is a DNR/DNI.    10:27 AM patient's chemistry significant for elevated LFTs with an AST and ALT of 122 and 127 respectively.  Alkaline phosphatase is 579 and total bilirubin is 4.2.  Further  review of patient's records.  Patient's POLST form indicates that he desires comfort directed care. Attempted to reach patients  @ [phone], Thania, his niece, but there is not answer. Patient was questioned about his own POLST but he is unaware of its contents.     10:51 AM called lab related to delay in CBC reporting.  They are having to verify results as the patient has a low platelet count.  Hemoglobin 10.  His white blood cell count I am told is normal. Will contact the Hospitalist for Admit. Palliative care consult ordered.    11:13 AM discussed with the admitting service, the patient's presentation.  The new finding of painless jaundice with abnormal liver enzymes.  Still awaiting report of CBC but I had verbal information from lab which is relayed to the admitting service.  Await ultrasound.  He is aware that I have consulted palliative care and at this time, will obtain a CT scan of the chest abdomen and pelvis to help with disposition.  The patient has reassuring vital signs.  He is not experiencing pain.  His creatinine is actually normal and on 2 evaluations in November of last year, it was elevated.  He agrees to admit the patient to their service.  I did discuss with the patient, hospital admission and he is agreeable.  CBC did result and it is as tach had reported.  Platelet count is low compared to prior values, previously 152 now 100.    ADDITIONAL PROBLEMS MANAGED    DISPOSITION AND DISCUSSIONS  I have discussed management of the patient with the following physicians and BRITTNEY's:  Hospitalist    Discussion of management with other QHP or appropriate source(s): None     Escalation of care considered, and ultimately not performed: None    Barriers to care at this time, including but not limited to: Dementia.     FINAL DIAGNOSIS  1. Fall, initial encounter    2. Painless jaundice    3. Hyperbilirubinemia    4. Elevated liver enzymes    5. Thrombocytopenia (HCC)    6. DNR (do not  resuscitate)    7. DNI (do not intubate)         Electronically signed by: Leah Núñez D.O., 3/9/2025 9:39 AM

## 2025-03-09 NOTE — ED NOTES
Unable to obtain med rec at this time. Patient is from [facility]. Unable to reach staff, main phone line goes straight to a full voicemail box. Alternative phone line for Sam[facility] ([phone]) attempted to call and left a detailed call back message. No MAR at bedside or in Chart. Unable to verify with pharmacy (closed)

## 2025-03-09 NOTE — ASSESSMENT & PLAN NOTE
Had a mechanical fall today  As per patient he slip from the edge of the bathroom  No head trauma,has superficial skin tears treated in the ER.   PT ordered

## 2025-03-09 NOTE — ASSESSMENT & PLAN NOTE
RUQ US showing cholelithiasis with no evidence of cholecystitis  CT abdomen/pelvis showing cholelithiasis with no intrahepatic or extrahepatic biliary dilation  No abdominal pain  Was seen 10/2024 for possible acute rickie but hida scan was neg and gen surgery recommended no intervention.

## 2025-03-09 NOTE — CARE PLAN
The patient is Stable - Low risk of patient condition declining or worsening    Shift Goals  Clinical Goals: MRI, pain control  Patient Goals: feel better and rest  Family Goals: no family present    Progress made toward(s) clinical / shift goals:    Problem: Pain - Standard  Goal: Alleviation of pain or a reduction in pain to the patient’s comfort goal  Description: Target End Date:  Prior to discharge or change in level of care    Document on Vitals flowsheet    1.  Document pain using the appropriate pain scale per order or unit policy  2.  Educate and implement non-pharmacologic comfort measures (i.e. relaxation, distraction, massage, cold/heat therapy, etc.)  3.  Pain management medications as ordered  4.  Reassess pain after pain med administration per policy  5.  If opiods administered assess patient's response to pain medication is appropriate per POSS sedation scale  6.  Follow pain management plan developed in collaboration with patient and interdisciplinary team (including palliative care or pain specialists if applicable)  3/9/2025 1544 by Davin Kelley R.N.  Outcome: Progressing  3/9/2025 1348 by Davin Kelley R.N.  Outcome: Progressing     Problem: Knowledge Deficit - Standard  Goal: Patient and family/care givers will demonstrate understanding of plan of care, disease process/condition, diagnostic tests and medications  Description: Target End Date:  1-3 days or as soon as patient condition allows    Document in Patient Education    1.  Patient and family/caregiver oriented to unit, equipment, visitation policy and means for communicating concern  2.  Complete/review Learning Assessment  3.  Assess knowledge level of disease process/condition, treatment plan, diagnostic tests and medications  4.  Explain disease process/condition, treatment plan, diagnostic tests and medications  3/9/2025 1544 by Davin Kelley R.N.  Outcome: Progressing  3/9/2025 1348 by Davin Kelley R.N.  Outcome: Progressing      Problem: Skin Integrity  Goal: Skin integrity is maintained or improved  Description: Target End Date:  Prior to discharge or change in level of care    Document interventions on Skin Risk/Stan flowsheet groups and corresponding LDA    1.  Assess and monitor skin integrity, appearance and/or temperature  2.  Assess risk factors for impaired skin integrity and/or pressures ulcers  3.  Implement precautions to protect skin integrity in collaboration with interdisciplinary team  4.  Implement pressure ulcer prevention protocol if at risk for skin breakdown  5.  Confirm wound care consult if at risk for skin breakdown  6.  Ensure patient use of pressure relieving devices  (Low air loss bed, waffle overlay, heel protectors, ROHO cushion, etc)  3/9/2025 1544 by Davin Kelley, R.N.  Outcome: Progressing  3/9/2025 1348 by Davin Kelley R.N.  Outcome: Progressing     Problem: Fall Risk  Goal: Patient will remain free from falls  Description: Target End Date:  Prior to discharge or change in level of care    Document interventions on the [facility] Fall Risk Assessment    1.  Assess for fall risk factors  2.  Implement fall precautions  3/9/2025 1544 by Davin Kelley, R.N.  Outcome: Progressing  3/9/2025 1348 by Davin Kelley, R.N.  Outcome: Progressing       Patient is not progressing towards the following goals:

## 2025-03-10 LAB
ALBUMIN SERPL BCP-MCNC: 2.6 G/DL (ref 3.2–4.9)
ALBUMIN/GLOB SERPL: 0.9 G/DL
ALP SERPL-CCNC: 489 U/L (ref 30–99)
ALT SERPL-CCNC: 100 U/L (ref 2–50)
ANION GAP SERPL CALC-SCNC: 11 MMOL/L (ref 7–16)
AST SERPL-CCNC: 98 U/L (ref 12–45)
BILIRUB SERPL-MCNC: 3 MG/DL (ref 0.1–1.5)
BUN SERPL-MCNC: 19 MG/DL (ref 8–22)
CALCIUM ALBUM COR SERPL-MCNC: 9.2 MG/DL (ref 8.5–10.5)
CALCIUM SERPL-MCNC: 8.1 MG/DL (ref 8.5–10.5)
CHLORIDE SERPL-SCNC: 102 MMOL/L (ref 96–112)
CO2 SERPL-SCNC: 20 MMOL/L (ref 20–33)
CREAT SERPL-MCNC: 1.32 MG/DL (ref 0.5–1.4)
ERYTHROCYTE [DISTWIDTH] IN BLOOD BY AUTOMATED COUNT: 53.4 FL (ref 35.9–50)
GFR SERPLBLD CREATININE-BSD FMLA CKD-EPI: 53 ML/MIN/1.73 M 2
GLOBULIN SER CALC-MCNC: 2.8 G/DL (ref 1.9–3.5)
GLUCOSE SERPL-MCNC: 112 MG/DL (ref 65–99)
HCT VFR BLD AUTO: 27.9 % (ref 42–52)
HGB BLD-MCNC: 9.3 G/DL (ref 14–18)
MCH RBC QN AUTO: 35.5 PG (ref 27–33)
MCHC RBC AUTO-ENTMCNC: 33.3 G/DL (ref 32.3–36.5)
MCV RBC AUTO: 106.5 FL (ref 81.4–97.8)
PLATELET # BLD AUTO: 94 K/UL (ref 164–446)
PLATELETS.RETICULATED NFR BLD AUTO: 3.3 % (ref 0.6–13.1)
PMV BLD AUTO: 11 FL (ref 9–12.9)
POTASSIUM SERPL-SCNC: 3.6 MMOL/L (ref 3.6–5.5)
PROT SERPL-MCNC: 5.4 G/DL (ref 6–8.2)
RBC # BLD AUTO: 2.62 M/UL (ref 4.7–6.1)
SODIUM SERPL-SCNC: 133 MMOL/L (ref 135–145)
WBC # BLD AUTO: 6.3 K/UL (ref 4.8–10.8)

## 2025-03-10 PROCEDURE — 85027 COMPLETE CBC AUTOMATED: CPT

## 2025-03-10 PROCEDURE — 97535 SELF CARE MNGMENT TRAINING: CPT

## 2025-03-10 PROCEDURE — 700102 HCHG RX REV CODE 250 W/ 637 OVERRIDE(OP): Mod: UD

## 2025-03-10 PROCEDURE — A9270 NON-COVERED ITEM OR SERVICE: HCPCS | Mod: UD

## 2025-03-10 PROCEDURE — 85055 RETICULATED PLATELET ASSAY: CPT

## 2025-03-10 PROCEDURE — 99232 SBSQ HOSP IP/OBS MODERATE 35: CPT | Mod: FS

## 2025-03-10 PROCEDURE — G0378 HOSPITAL OBSERVATION PER HR: HCPCS

## 2025-03-10 PROCEDURE — 80053 COMPREHEN METABOLIC PANEL: CPT

## 2025-03-10 PROCEDURE — A9270 NON-COVERED ITEM OR SERVICE: HCPCS | Mod: UD | Performed by: HOSPITALIST

## 2025-03-10 PROCEDURE — 36415 COLL VENOUS BLD VENIPUNCTURE: CPT

## 2025-03-10 PROCEDURE — 700102 HCHG RX REV CODE 250 W/ 637 OVERRIDE(OP): Mod: UD | Performed by: HOSPITALIST

## 2025-03-10 RX ADMIN — BENAZEPRIL HYDROCHLORIDE 10 MG: 20 TABLET ORAL at 21:13

## 2025-03-10 RX ADMIN — DIVALPROEX SODIUM 250 MG: 250 TABLET, DELAYED RELEASE ORAL at 09:35

## 2025-03-10 RX ADMIN — DIVALPROEX SODIUM 250 MG: 250 TABLET, DELAYED RELEASE ORAL at 21:13

## 2025-03-10 RX ADMIN — OXYCODONE 5 MG: 5 TABLET ORAL at 14:08

## 2025-03-10 RX ADMIN — TAMSULOSIN HYDROCHLORIDE 0.4 MG: 0.4 CAPSULE ORAL at 06:21

## 2025-03-10 RX ADMIN — OMEPRAZOLE 40 MG: 20 CAPSULE, DELAYED RELEASE ORAL at 09:35

## 2025-03-10 RX ADMIN — BUSPIRONE HYDROCHLORIDE 5 MG: 5 TABLET ORAL at 23:38

## 2025-03-10 RX ADMIN — OMEPRAZOLE 40 MG: 20 CAPSULE, DELAYED RELEASE ORAL at 21:13

## 2025-03-10 RX ADMIN — BUSPIRONE HYDROCHLORIDE 5 MG: 5 TABLET ORAL at 09:35

## 2025-03-10 RX ADMIN — OXYCODONE 5 MG: 5 TABLET ORAL at 21:24

## 2025-03-10 RX ADMIN — OXYCODONE 5 MG: 5 TABLET ORAL at 00:31

## 2025-03-10 RX ADMIN — OXYCODONE 5 MG: 5 TABLET ORAL at 09:34

## 2025-03-10 RX ADMIN — SENNOSIDES AND DOCUSATE SODIUM 2 TABLET: 50; 8.6 TABLET ORAL at 16:44

## 2025-03-10 RX ADMIN — DIVALPROEX SODIUM 250 MG: 250 TABLET, DELAYED RELEASE ORAL at 14:08

## 2025-03-10 RX ADMIN — TRIAMCINOLONE ACETONIDE 1 APPLICATION: 1 CREAM TOPICAL at 21:27

## 2025-03-10 RX ADMIN — BUSPIRONE HYDROCHLORIDE 5 MG: 5 TABLET ORAL at 16:44

## 2025-03-10 RX ADMIN — ASPIRIN 81 MG: 81 TABLET, COATED ORAL at 06:21

## 2025-03-10 RX ADMIN — Medication 10 MG: at 21:12

## 2025-03-10 RX ADMIN — QUETIAPINE FUMARATE 300 MG: 100 TABLET ORAL at 21:12

## 2025-03-10 RX ADMIN — BUSPIRONE HYDROCHLORIDE 5 MG: 5 TABLET ORAL at 00:33

## 2025-03-10 RX ADMIN — LEVOTHYROXINE SODIUM 88 MCG: 0.09 TABLET ORAL at 06:21

## 2025-03-10 ASSESSMENT — ENCOUNTER SYMPTOMS
SHORTNESS OF BREATH: 0
SORE THROAT: 0
WEAKNESS: 0
HEADACHES: 0
COUGH: 0
FLANK PAIN: 0
DIZZINESS: 0
ABDOMINAL PAIN: 0
NAUSEA: 0
PALPITATIONS: 0
VOMITING: 0
BLURRED VISION: 0
CHILLS: 0
DOUBLE VISION: 0
FALLS: 0
MYALGIAS: 0
DEPRESSION: 0
FEVER: 0

## 2025-03-10 ASSESSMENT — PAIN DESCRIPTION - PAIN TYPE
TYPE: ACUTE PAIN

## 2025-03-10 NOTE — PROGRESS NOTES
[facility] Medicine Daily Progress Note    Date of Service  3/10/2025    Chief Complaint  Sina Lewis is a 84 y.o. male admitted 3/9/2025 with mechanical ground-level fall.    Hospital Course  Sina Lewis is a 84 y.o. male with a past medical history of with past medical history of stroke, hypertension, heart failure, diverticulosis, hypothyroidism who presented 3/9/2025 with mechanical ground level fall.    Patient came today from group home. Patient apparently had a mechanical fall he was sitting at the edge of the bathroom and he slipped off. He denies any head injury but he complaining of left hip and left knee right shoulder pain. Patient has history of dementia but he is able to speak in full sentences and gives on information although has very severe memory impairment. He notes that he lives here in [place] but he does not know exactly where he knows that he is in a facility/group home. Patient has a niece who is also helping him and he watching every day. Patient was noticed to be jaundiced, bilirubin is elevated, patient stated that he has noticed a mass in his abdomen on palpation but has not had any recent imaging studies.     Patient was here in October last year for possible acute cholecystitis, patient had HIDA scan that was negative and general surgery recommended medical management at that time, CT abdomen in October 2024 showed no acute findings, cholelithiasis, no findings on biliary system or liver or spleen or pancreas.    In the emergency department vital signs stable.  Labs significant for hemoglobin 10.3, , , alkaline phosphatase 579, total bilirubin 4.2, direct bilirubin 3.0, indirect bilirubin 1.2, albumin 3.0.  CT chest abdomen pelvis showing cholelithiasis with no intrahepatic or extrahepatic biliary dilation.  X-ray of the hip shows no acute processes.  Right upper quadrant ultrasound shows cholelithiasis with no sonographic evidence of acute  cholecystitis.    Patient seen and examined this a.m.  He currently is denying any abdominal pain.  Additionally he states that he has a mass in his abdomen with during palpation when we pressed over the sternum he said that is where the masses.  Educated patient that that was part of his sternum.  Discussed with the patient that he potentially had a stone that has since passes laboratory is starting to improve.  Discussed with the patient that we will transfer him and follow-up on labs prior to any further imaging or intervention.  Physical therapy has been ordered.    Interval Problem Update  Patient currently denies any abdominal pain at this time.  He does state that he is having left lower extremity pain closer to the heel of his foot.  Upon examination there is no open wounds, swelling, or erythema.  -Plan of care: Pain management.  Physical therapy ordered.  Repeat CMP in the AM.  -Disposition: Patient be transferred as observation status for further management.    I have discussed this patient's plan of care and discharge plan at IDT rounds today with Case Management, Nursing, Nursing leadership, and other members of the IDT team.    Consultants/Specialty  None    Code Status  DNAR/DNI    Disposition  The patient is not medically cleared for discharge to home or a post-acute facility.  Anticipate discharge to: home with close outpatient follow-up    I have placed the appropriate orders for post-discharge needs.    Review of Systems  Review of Systems   Constitutional:  Negative for chills, fever and malaise/fatigue.   HENT:  Negative for congestion and sore throat.    Eyes:  Negative for blurred vision and double vision.   Respiratory:  Negative for cough and shortness of breath.    Cardiovascular:  Negative for chest pain, palpitations and leg swelling.   Gastrointestinal:  Negative for abdominal pain, nausea and vomiting.   Genitourinary:  Negative for dysuria and flank pain.   Musculoskeletal:  Negative  for falls and myalgias.        LLE pain   Skin:  Negative for itching and rash.   Neurological:  Negative for dizziness, weakness and headaches.   Psychiatric/Behavioral:  Negative for depression and suicidal ideas.         Physical Exam  Temp:  [36.1 °C (97 °F)-36.7 °C (98 °F)] 36.7 °C (98 °F)  Pulse:  [57-86] 69  Resp:  [16-18] 18  BP: (110-138)/() 116/52  SpO2:  [94 %-99 %] 98 %    Physical Exam  Constitutional:       Appearance: Normal appearance.   HENT:      Head: Normocephalic and atraumatic.      Nose: Nose normal.      Mouth/Throat:      Mouth: Mucous membranes are moist.   Eyes:      Pupils: Pupils are equal, round, and reactive to light.   Cardiovascular:      Rate and Rhythm: Normal rate and regular rhythm.      Pulses: Normal pulses.      Heart sounds: Normal heart sounds.   Pulmonary:      Effort: Pulmonary effort is normal.      Breath sounds: Normal breath sounds.   Abdominal:      General: Bowel sounds are normal.      Palpations: Abdomen is soft.   Musculoskeletal:         General: Normal range of motion.      Cervical back: Normal range of motion.   Skin:     Coloration: Skin is jaundiced.   Neurological:      Mental Status: He is alert. Mental status is at baseline.      Comments: Dementa history   Psychiatric:         Mood and Affect: Mood normal.         Behavior: Behavior normal.         Fluids    Intake/Output Summary (Last 24 hours) at 3/10/2025 1101  Last data filed at 3/9/2025 2212  Gross per 24 hour   Intake --   Output 450 ml   Net -450 ml        Laboratory  Recent Labs     03/09/25  0950 03/10/25  0111   WBC 6.5 6.3   RBC 2.93* 2.62*   HEMOGLOBIN 10.3* 9.3*   HEMATOCRIT 31.8* 27.9*   .5* 106.5*   MCH 35.2* 35.5*   MCHC 32.4 33.3   RDW 56.2* 53.4*   PLATELETCT 100* 94*   MPV 10.8 11.0     Recent Labs     03/09/25  0950 03/10/25  0111   SODIUM 135 133*   POTASSIUM 3.8 3.6   CHLORIDE 103 102   CO2 23 20   GLUCOSE 84 112*   BUN 20 19   CREATININE 1.26 1.32   CALCIUM 8.5 8.1*      Recent Labs     03/09/25  1030   INR 1.06               Imaging  CT-CHEST,ABDOMEN,PELVIS WITH   Final Result      1. Cholelithiasis.   2. No intrahepatic or extrahepatic biliary dilation.   3. Pancreas is normal.   4. No retroperitoneal adenopathy.   5. Simple appearing bilateral renal cortical cysts, requiring no further imaging follow-up.   6. Atherosclerosis.      US-RUQ   Final Result      1. Cholelithiasis. No sonographic evidence of acute cholecystitis.   2. Lobular contour of the right kidney with a simple appearing upper pole right renal cortical cyst measuring 3.9 cm in diameter.   3. The remainder of the right upper quadrant abdominal sonogram is within normal limits.      DX-HIP-UNILATERAL-WITH PELVIS-1 VIEW LEFT   Final Result         1. No acute processes.   2. Left JACKIE is unremarkable.   3. Osteopenia.           Assessment/Plan  * Jaundice- (present on admission)  Assessment & Plan  Painless jaundice likely secondary to stone that has since passed as laboratory is starting to improve  RUQ US showing cholelithiasis with no evidence of cholecystitis  CT abdomen/pelvis showing cholelithiasis with no intrahepatic or extrahepatic biliary dilation  Trend CMP in the am    Elevated LFTs- (present on admission)  Assessment & Plan  RUQ US showing cholelithiasis with no evidence of cholecystitis  CT abdomen/pelvis showing cholelithiasis with no intrahepatic or extrahepatic biliary dilation  No abdominal pain  Was seen 10/2024 for possible acute rickie but hida scan was neg and gen surgery recommended no intervention.     Dementia (HCC)- (present on admission)  Assessment & Plan  At baseline  Able to answer questions.   No agitation.     History of stroke- (present on admission)  Assessment & Plan  No new neuro deficit.  Continue monitoring.     Frequent falls- (present on admission)  Assessment & Plan  Had a mechanical fall today  As per patient he slip from the edge of the bathroom  No head trauma,has  superficial skin tears treated in the ER.   PT ordered    Diverticulosis- (present on admission)  Assessment & Plan  No abdominal pain  Monitoring  No h/o bleeding.     Hypothyroidism- (present on admission)  Assessment & Plan  Continue levothyroxine    CAD (coronary artery disease)- (present on admission)  Assessment & Plan  No chest pain   Continue monitoring  Continue home medications.     Hyperlipidemia- (present on admission)  Assessment & Plan  Lipid panel in am    HTN (hypertension)- (present on admission)  Assessment & Plan  Continue home meds         VTE prophylaxis:   SCDs/TEDs      I have performed a physical exam and reviewed and updated ROS and Plan today (3/10/2025). In review of yesterday's note (3/9/2025), there are no changes except as documented above.      IVeronica A.P.R.N. performed a substantiated portion of the service face-to-face with same patient on the same date of service INDEPENDENTLY FROM THE MD ON ASSESSMENT, EXAMINATION, AND DISCUSSION IN PLAN OF CARE FOR 18 MINUTES.  I was personally involved in reviewing and conducting the medical decision making, including the information as described below:

## 2025-03-10 NOTE — PROGRESS NOTES
Bedside report received. POC discussed with pt; Pt c/o pain to left hip and knee, medicated per MAR. Moisturizer applied to bilat feet for comfort. all questions answered at this time.

## 2025-03-10 NOTE — PROGRESS NOTES
4 Eyes Skin Assessment Completed by JANINE Esquivel and JANINE Brooks.    Head WDL  Ears WDL  Nose WDL  Mouth WDL  Neck WDL  Breast/Chest WDL  Shoulder Blades WDL  Spine WDL  (R) Arm/Elbow/Hand Redness and Abrasion skin tear, bruising  (L) Arm/Elbow/Hand Bruising  Abdomen WDL  Groin Redness and Blanching  Scrotum/Coccyx/Buttocks Redness and Blanching  (R) Leg WDL  (L) Leg Scab and Bruising  (R) Heel/Foot/Toe Redness and Blanching  (L) Heel/Foot/Toe Redness and Blanching                                    Interventions In Place Heel Mepilex, Pillows, and Barrier Cream, mepitel  Possible Skin Injury Yes  Pictures Uploaded Into Epic Yes  Wound Consult Placed N/A  RN Wound Prevention Protocol Ordered Yes

## 2025-03-10 NOTE — PROGRESS NOTES
Report received. Assumed care. Pt AAOx3 baseline. VSS. Pt states 9/10 pain to L hip and knee, pending med orders at this time, No other complaints reported; Pt was updated on POC, MRI abd, pain control, enema, fall precautions, PT/OT. White board updated, All question answered. Pt has call light within reach, bed is in the lowest position. Pt has no other needs at this time.

## 2025-03-10 NOTE — PROGRESS NOTES
Skin Assessment.    Spine Redness and Blanching  (R) Arm/Elbow/Hand Redness and Blanching, Skin tear  Groin Redness, Blanching, and Excoriation  Scrotum/Coccyx/Buttocks Redness and Blanching  (L) Leg Thigh Abrasion        Devices In Places Pulse Ox      Interventions In Place Pillows and Barrier Cream    Possible Skin Injury Yes    Pictures Uploaded Into Epic Yes  Wound Consult Placed Yes  RN Wound Prevention Protocol Ordered Yes

## 2025-03-10 NOTE — THERAPY
Occupational Therapy Contact Note    Patient Name: Sina Lewis  Age:  84 y.o., Sex:  male  Medical Record #: [id number]  Today's Date: 3/10/2025    OT orders received and eval attempted.  Pt is a 84 y.o. male a dmitted with left hip, left knee, and right elbow pain after a GLF. Initial workup that did not show any acute findings like fractures, patient has multiple skin tears which are superficial and they were covered in ER. Pt diagnosed with jaundice and elevated LFTs. PMHx of stroke, hypertension, heart failure, diverticulosis, CAD, and hypothyroidism.    Following introduction and acquisition of PLOF and home set-up, pt declined any form of mobility due to pain. Pt was provided education on role of acute OT and importance of EOB/OOB ADLs to reduce risk of deconditioning. Pt continued to decline. Assisted pt with proper positioning in bed to safley eat meal. Will re-attempt when appropriate/able.       Prior Living Situation   Housing / Facility Group Home   Equipment Owned Front-Wheel Walker   Lives with - Patient's Self Care Capacity Attendant / Paid Care Giver   Comments Pt is pleasantly confused and unable to recall information regarding home set-up, PLOF, and level of assist available to him.   Prior Level of ADL Function   Comments Pt reported that he was independent with ADLs PTA.   Prior Level of IADL Function   Comments Unable to determine at this time.   History of Falls   History of Falls Yes   Date of Last Fall   (Related to admit)   Education Group   Education Provided Role of Occupational Therapist;Pathology of bedrest   Role of Occupational Therapist Patient Response Patient;Acceptance;Explanation;Reinforcement Needed   Pathology of Bedrest Patient Response Patient;Acceptance;Explanation;Reinforcement Needed

## 2025-03-10 NOTE — HOSPITAL COURSE
Sina Lewis is a 84 y.o. male with a past medical history of with past medical history of stroke, hypertension, heart failure, diverticulosis, hypothyroidism who presented 3/9/2025 with mechanical ground level fall.    Patient came today from group home. Patient apparently had a mechanical fall he was sitting at the edge of the bathroom and he slipped off. He denies any head injury but he complaining of left hip and left knee right shoulder pain. Patient has history of dementia but he is able to speak in full sentences and gives on information although has very severe memory impairment. He notes that he lives here in [place] but he does not know exactly where he knows that he is in a facility/group home. Patient has a niece who is also helping him and he watching every day. Patient was noticed to be jaundiced, bilirubin is elevated, patient stated that he has noticed a mass in his abdomen on palpation but has not had any recent imaging studies.     Patient was here in October last year for possible acute cholecystitis, patient had HIDA scan that was negative and general surgery recommended medical management at that time, CT abdomen in October 2024 showed no acute findings, cholelithiasis, no findings on biliary system or liver or spleen or pancreas.    In the emergency department vital signs stable.  Labs significant for hemoglobin 10.3, , , alkaline phosphatase 579, total bilirubin 4.2, direct bilirubin 3.0, indirect bilirubin 1.2, albumin 3.0.  CT chest abdomen pelvis showing cholelithiasis with no intrahepatic or extrahepatic biliary dilation.  X-ray of the hip shows no acute processes.  Right upper quadrant ultrasound shows cholelithiasis with no sonographic evidence of acute cholecystitis.    Patient seen and examined this a.m.  He currently is denying any abdominal pain.  Additionally he states that he has a mass in his abdomen with during palpation when we pressed over the sternum he  said that is where the masses.  Educated patient that that was part of his sternum.  Discussed with the patient that he potentially had a stone that has since passes laboratory is starting to improve.  Discussed with the patient that we will transfer him and follow-up on labs prior to any further imaging or intervention.  Physical therapy has been ordered.

## 2025-03-10 NOTE — CONSULTS
"PALLIATIVE CARE SOCIAL WORK CONSULT NOTE    Patient: Sina Lewis  Age: 84  Gender: Male  MRN: [id number]  Insurance: Prominence  Date Admitted: 3/9/25  Date of Service: 3/10/25    HPI  Per provider note dated 3/10, \"Sina Lewis is a 84 y.o. male with a past medical history of with past medical history of stroke, hypertension, heart failure, diverticulosis, hypothyroidism who presented 3/9/2025 with mechanical ground level fall.     Patient came today from group home. Patient apparently had a mechanical fall he was sitting at the edge of the bathroom and he slipped off. He denies any head injury but he complaining of left hip and left knee right shoulder pain. Patient has history of dementia but he is able to speak in full sentences and gives on information although has very severe memory impairment. He notes that he lives here in [place] but he does not know exactly where he knows that he is in a facility/group home. Patient has a niece who is also helping him and he watching every day. Patient was noticed to be jaundiced, bilirubin is elevated, patient stated that he has noticed a mass in his abdomen on palpation but has not had any recent imaging studies.      Patient was here in October last year for possible acute cholecystitis, patient had HIDA scan that was negative and general surgery recommended medical management at that time, CT abdomen in October 2024 showed no acute findings, cholelithiasis, no findings on biliary system or liver or spleen or pancreas.     In the emergency department vital signs stable.  Labs significant for hemoglobin 10.3, , , alkaline phosphatase 579, total bilirubin 4.2, direct bilirubin 3.0, indirect bilirubin 1.2, albumin 3.0.  CT chest abdomen pelvis showing cholelithiasis with no intrahepatic or extrahepatic biliary dilation.  X-ray of the hip shows no acute processes.  Right upper quadrant ultrasound shows cholelithiasis with no sonographic evidence of " "acute cholecystitis.     Patient seen and examined this a.m.  He currently is denying any abdominal pain.  Additionally he states that he has a mass in his abdomen with during palpation when we pressed over the sternum he said that is where the masses.  Educated patient that that was part of his sternum.  Discussed with the patient that he potentially had a stone that has since passes laboratory is starting to improve.  Discussed with the patient that we will transfer him and follow-up on labs prior to any further imaging or intervention.  Physical therapy has been ordered\"    Palliative care was consulted for GOC/ACP.     ACP  LMSW met with patient. He was sitting in bed and appeared A+Ox2-3. There were no family at bedside. LMSW introduced self and explained role of palliative care. Patient was agreeable to discuss his healthcare wishes/advance care planning. Patient was able to verbalize he had a fall and knows he is at the hospital. He also said he is jaundiced. He was not able to verbalize the name of the group home where he lives or how long he has been there or discuss his prior level of functioning. He said \"someone helps take care of me\".     Family System/Social Situation: Patient said he has 5 children but could not name any. \"I think there is Jhon, Mika(also may be a nephew-maybe passed away), and the daughter but I don't remember her name right now.\" He said his niece, Thania, helps. Both numbers on file for her are disconnected. He also said Thania was his niece, daughter, his wife, and the  who sees him.     Importance of Spirituality/Margarita: Did not assess.    : He is not a .    CODE STATUS: Did not discuss    POLST: DNR POLST on file.    ADVANCE DIRECTIVE: LMSW explained purpose of document. He does not have one completed.      GOALS OF CARE  Patient was unable to explore next steps or if he is going back to the group home. \"Everything is up in the air now\".     LMSW called " [facility] and patient was a non admit in 11/24 and is not currently a patient with them.    [facility] updated care team.     FOLLOW UP: Palliative care to sign off at this time.    Recommend next of kin search to find decision maker. If no kin are found, recommend guardianship. Please reconsult once decision maker is found, if needed.     Kaur Lovett, [facility]  Palliative Care

## 2025-03-10 NOTE — DISCHARGE PLANNING
Care Transition Team Assessment    In case of emergency, contact pt's niece, Thania, [phone]    RN BRADY spoke with pt's niece, Thania, and Aga, owner of group [facility] for assessment as pt a poor historian.  He lives in a group home, [facility], at [address] [place] [address], where he has been for a couple years. Per Aga, pt is able to ambulate with his walker independently at the . He has had falls and he does have dementia so his orientation fluctuates. [facility] and discharged because they said he didn't qualify for hospice. He has had [facility] in the past as well. He has a physician that sees him at the group [facility]. He has a POLST on file. Patient does have MTM benefts. Per Thania, his niece, he has no other family and she is able to make healthcare decisions for him if needed. Aga says he is able to return to group [facility] whenever he is discharged. Transport will need to be set up.     Information Source  Orientation Level: Oriented to place, Oriented to situation, Oriented to person, Disoriented to time  Information Given By: Relative, Other (Comments) (group home staff)    Readmission Evaluation  Is this a readmission?: No    Elopement Risk  Legal Hold: No  Ambulatory or Self Mobile in Wheelchair: No-Not an Elopement Risk  Disoriented: Time-At Risk for Elopement  Psychiatric Symptoms: None  History of Wandering: No  Elopement this Admit: No  Vocalizing Wanting to Leave: No  Displays Behaviors, Body Language Wanting to Leave: No-Not at Risk for Elopement  Elopement Risk: Not at Risk for Elopement    Interdisciplinary Discharge Planning  Primary Care Physician: Group home MD  Lives with - Patient's Self Care Capacity: Attendant / Paid Care Giver  Patient or legal guardian wants to designate a caregiver: No  Support Systems: Home Care Staff  Housing / Facility: [facility]  [facility] Care Facility: [facility]  Mobility Issues: No  Prior Services:  None  Assistance Needed: Yes    Discharge Preparedness  What is your plan after discharge?: Skilled nursing facility  What are your discharge supports?: Other (comment) (jelly,  staff)  Prior Functional Level: Needs Assist with Medication Management, Ambulatory  Difficulity with IADLs: Cooking, Driving, Keeping track of finances, Laundry, Shopping, Managing medication  Difficulity with IADL Comments:  staff    Functional Assesment  Prior Functional Level: Needs Assist with Medication Management, Ambulatory    Finances  Financial Barriers to Discharge: No  Prescription Coverage: Yes    Vision / Hearing Impairment  Vision Impairment : Yes  Right Eye Vision: Wears Glasses, Impaired  Left Eye Vision: Impaired, Wears Glasses  Hearing Impairment : Yes  Hearing Impairment: Hearing Device Not Available    Advance Directive  Advance Directive?: POLST    Domestic Abuse  Have you ever been the victim of abuse or violence?: No  Possible Abuse/Neglect Reported to:: Not Applicable    Psychological Assessment  History of Substance Abuse: None  Newly Diagnosed Illness: No    Discharge Risks or Barriers  Patient risk factors: Complex medical needs    Anticipated Discharge Information  Discharge Disposition: D/T to SNF with Medicare cert in anticipation of skilled care (03)

## 2025-03-10 NOTE — CARE PLAN
The patient is Watcher - Medium risk of patient condition declining or worsening    Shift Goals  Clinical Goals: MRI abd, pain control, enema, fall precautions, PT/OT  Patient Goals: have BM, less pain  Family Goals: SILVIO    Progress made toward(s) clinical / shift goals:    Problem: Pain - Standard  Goal: Alleviation of pain or a reduction in pain to the patient’s comfort goal  Description: Target End Date:  Prior to discharge or change in level of care    1.  Document pain using the appropriate pain scale per order or unit policy  2.  Educate and implement non-pharmacologic comfort measures (i.e. relaxation, distraction, massage, cold/heat therapy, etc.)  3.  Pain management medications as ordered  4.  Reassess pain after pain med administration per policy  5.  If opiods administered assess patient's response to pain medication is appropriate per POSS sedation scale  6.  Follow pain management plan developed in collaboration with patient and interdisciplinary team (including palliative care or pain specialists if applicable)  Outcome: Progressing     Problem: Knowledge Deficit - Standard  Goal: Patient and family/care givers will demonstrate understanding of plan of care, disease process/condition, diagnostic tests and medications  Description: Target End Date:  1-3 days or as soon as patient condition allows    1.  Patient and family/caregiver oriented to unit, equipment, visitation policy and means for communicating concern  2.  Complete/review Learning Assessment  3.  Assess knowledge level of disease process/condition, treatment plan, diagnostic tests and medications  4.  Explain disease process/condition, treatment plan, diagnostic tests and medications  Outcome: Progressing     Problem: Skin Integrity  Goal: Skin integrity is maintained or improved  Description: Target End Date:  Prior to discharge or change in level of care    1.  Assess and monitor skin integrity, appearance and/or temperature  2.  Assess  risk factors for impaired skin integrity and/or pressures ulcers  3.  Implement precautions to protect skin integrity in collaboration with interdisciplinary team  4.  Implement pressure ulcer prevention protocol if at risk for skin breakdown  5.  Confirm wound care consult if at risk for skin breakdown  6.  Ensure patient use of pressure relieving devices  (Low air loss bed, waffle overlay, heel protectors, ROHO cushion, etc)  Outcome: Progressing     Problem: Fall Risk  Goal: Patient will remain free from falls  Description: Target End Date:  Prior to discharge or change in level of care    1.  Assess for fall risk factors  2.  Implement fall precautions  Outcome: Progressing

## 2025-03-10 NOTE — CARE PLAN
The patient is Stable - Low risk of patient condition declining or worsening      Problem: Pain - Standard  Goal: Alleviation of pain or a reduction in pain to the patient’s comfort goal  Outcome: Progressing     Problem: Skin Integrity  Goal: Skin integrity is maintained or improved  Outcome: Progressing     Problem: Fall Risk  Goal: Patient will remain free from falls  Outcome: Progressing     Shift Goals  Clinical Goals: Pain control throughout shift, maintain skin integrity  Patient Goals: Pain control  Family Goals: SILVIO    Progress made toward(s) clinical / shift goals:      Pt alert and oriented. VSS. On room air. C/O pain, medication given as ordered and heat packs. 2 RN skin check completed. Reinforced education on fall precaution, call light within reach, bed alarm on.

## 2025-03-11 LAB
ALBUMIN SERPL BCP-MCNC: 2.8 G/DL (ref 3.2–4.9)
ALBUMIN/GLOB SERPL: 0.9 G/DL
ALP SERPL-CCNC: 512 U/L (ref 30–99)
ALT SERPL-CCNC: 101 U/L (ref 2–50)
ANION GAP SERPL CALC-SCNC: 10 MMOL/L (ref 7–16)
AST SERPL-CCNC: 96 U/L (ref 12–45)
BILIRUB SERPL-MCNC: 2.7 MG/DL (ref 0.1–1.5)
BUN SERPL-MCNC: 17 MG/DL (ref 8–22)
CALCIUM ALBUM COR SERPL-MCNC: 9.2 MG/DL (ref 8.5–10.5)
CALCIUM SERPL-MCNC: 8.2 MG/DL (ref 8.5–10.5)
CHLORIDE SERPL-SCNC: 101 MMOL/L (ref 96–112)
CHOLEST SERPL-MCNC: 289 MG/DL (ref 100–199)
CO2 SERPL-SCNC: 23 MMOL/L (ref 20–33)
CREAT SERPL-MCNC: 1.35 MG/DL (ref 0.5–1.4)
ERYTHROCYTE [DISTWIDTH] IN BLOOD BY AUTOMATED COUNT: 55.4 FL (ref 35.9–50)
GFR SERPLBLD CREATININE-BSD FMLA CKD-EPI: 52 ML/MIN/1.73 M 2
GLOBULIN SER CALC-MCNC: 3.1 G/DL (ref 1.9–3.5)
GLUCOSE SERPL-MCNC: 91 MG/DL (ref 65–99)
HCT VFR BLD AUTO: 29.8 % (ref 42–52)
HDLC SERPL-MCNC: 33 MG/DL
HGB BLD-MCNC: 9.6 G/DL (ref 14–18)
LDLC SERPL CALC-MCNC: 231 MG/DL
MCH RBC QN AUTO: 34.5 PG (ref 27–33)
MCHC RBC AUTO-ENTMCNC: 32.2 G/DL (ref 32.3–36.5)
MCV RBC AUTO: 107.2 FL (ref 81.4–97.8)
PLATELET # BLD AUTO: 112 K/UL (ref 164–446)
PLATELETS.RETICULATED NFR BLD AUTO: 5.7 % (ref 0.6–13.1)
PMV BLD AUTO: 10.6 FL (ref 9–12.9)
POTASSIUM SERPL-SCNC: 3.8 MMOL/L (ref 3.6–5.5)
PROT SERPL-MCNC: 5.9 G/DL (ref 6–8.2)
RBC # BLD AUTO: 2.78 M/UL (ref 4.7–6.1)
SODIUM SERPL-SCNC: 134 MMOL/L (ref 135–145)
TRIGL SERPL-MCNC: 125 MG/DL (ref 0–149)
WBC # BLD AUTO: 5.3 K/UL (ref 4.8–10.8)

## 2025-03-11 PROCEDURE — 80061 LIPID PANEL: CPT

## 2025-03-11 PROCEDURE — 700102 HCHG RX REV CODE 250 W/ 637 OVERRIDE(OP): Mod: UD

## 2025-03-11 PROCEDURE — 80053 COMPREHEN METABOLIC PANEL: CPT

## 2025-03-11 PROCEDURE — 97162 PT EVAL MOD COMPLEX 30 MIN: CPT

## 2025-03-11 PROCEDURE — 36415 COLL VENOUS BLD VENIPUNCTURE: CPT

## 2025-03-11 PROCEDURE — A9270 NON-COVERED ITEM OR SERVICE: HCPCS | Mod: UD | Performed by: STUDENT IN AN ORGANIZED HEALTH CARE EDUCATION/TRAINING PROGRAM

## 2025-03-11 PROCEDURE — 85027 COMPLETE CBC AUTOMATED: CPT

## 2025-03-11 PROCEDURE — 99232 SBSQ HOSP IP/OBS MODERATE 35: CPT | Performed by: STUDENT IN AN ORGANIZED HEALTH CARE EDUCATION/TRAINING PROGRAM

## 2025-03-11 PROCEDURE — 700102 HCHG RX REV CODE 250 W/ 637 OVERRIDE(OP): Mod: UD | Performed by: STUDENT IN AN ORGANIZED HEALTH CARE EDUCATION/TRAINING PROGRAM

## 2025-03-11 PROCEDURE — A9270 NON-COVERED ITEM OR SERVICE: HCPCS | Mod: UD

## 2025-03-11 PROCEDURE — 97166 OT EVAL MOD COMPLEX 45 MIN: CPT

## 2025-03-11 PROCEDURE — 700102 HCHG RX REV CODE 250 W/ 637 OVERRIDE(OP): Mod: UD | Performed by: HOSPITALIST

## 2025-03-11 PROCEDURE — G0378 HOSPITAL OBSERVATION PER HR: HCPCS

## 2025-03-11 PROCEDURE — A9270 NON-COVERED ITEM OR SERVICE: HCPCS | Mod: UD | Performed by: HOSPITALIST

## 2025-03-11 PROCEDURE — 85055 RETICULATED PLATELET ASSAY: CPT

## 2025-03-11 RX ORDER — BUSPIRONE HYDROCHLORIDE 10 MG/1
5 TABLET ORAL 3 TIMES DAILY
Status: DISCONTINUED | OUTPATIENT
Start: 2025-03-12 | End: 2025-03-12 | Stop reason: HOSPADM

## 2025-03-11 RX ORDER — OXYCODONE HYDROCHLORIDE 10 MG/1
10 TABLET ORAL EVERY 4 HOURS PRN
Refills: 0 | Status: DISCONTINUED | OUTPATIENT
Start: 2025-03-11 | End: 2025-03-12 | Stop reason: HOSPADM

## 2025-03-11 RX ORDER — HYDROMORPHONE HYDROCHLORIDE 1 MG/ML
0.25 INJECTION, SOLUTION INTRAMUSCULAR; INTRAVENOUS; SUBCUTANEOUS
Status: DISCONTINUED | OUTPATIENT
Start: 2025-03-11 | End: 2025-03-12 | Stop reason: HOSPADM

## 2025-03-11 RX ADMIN — DIVALPROEX SODIUM 250 MG: 250 TABLET, DELAYED RELEASE ORAL at 09:02

## 2025-03-11 RX ADMIN — SENNOSIDES AND DOCUSATE SODIUM 2 TABLET: 50; 8.6 TABLET ORAL at 16:51

## 2025-03-11 RX ADMIN — OXYCODONE 5 MG: 5 TABLET ORAL at 01:57

## 2025-03-11 RX ADMIN — QUETIAPINE FUMARATE 300 MG: 100 TABLET ORAL at 21:03

## 2025-03-11 RX ADMIN — DIVALPROEX SODIUM 250 MG: 250 TABLET, DELAYED RELEASE ORAL at 14:29

## 2025-03-11 RX ADMIN — OMEPRAZOLE 40 MG: 20 CAPSULE, DELAYED RELEASE ORAL at 09:02

## 2025-03-11 RX ADMIN — ASPIRIN 81 MG: 81 TABLET, COATED ORAL at 04:49

## 2025-03-11 RX ADMIN — BUSPIRONE HYDROCHLORIDE 5 MG: 5 TABLET ORAL at 09:02

## 2025-03-11 RX ADMIN — OMEPRAZOLE 40 MG: 20 CAPSULE, DELAYED RELEASE ORAL at 21:02

## 2025-03-11 RX ADMIN — OXYCODONE HYDROCHLORIDE 10 MG: 10 TABLET ORAL at 19:42

## 2025-03-11 RX ADMIN — OXYCODONE HYDROCHLORIDE 10 MG: 10 TABLET ORAL at 14:28

## 2025-03-11 RX ADMIN — Medication 10 MG: at 21:03

## 2025-03-11 RX ADMIN — DIVALPROEX SODIUM 250 MG: 250 TABLET, DELAYED RELEASE ORAL at 21:03

## 2025-03-11 RX ADMIN — TRIAMCINOLONE ACETONIDE 1 APPLICATION: 1 CREAM TOPICAL at 09:02

## 2025-03-11 RX ADMIN — TAMSULOSIN HYDROCHLORIDE 0.4 MG: 0.4 CAPSULE ORAL at 04:49

## 2025-03-11 RX ADMIN — BUSPIRONE HYDROCHLORIDE 5 MG: 5 TABLET ORAL at 16:51

## 2025-03-11 RX ADMIN — LEVOTHYROXINE SODIUM 88 MCG: 0.09 TABLET ORAL at 04:49

## 2025-03-11 ASSESSMENT — ENCOUNTER SYMPTOMS
HEADACHES: 0
FALLS: 0
MYALGIAS: 0
NAUSEA: 0
DEPRESSION: 0
ABDOMINAL PAIN: 0
VOMITING: 0
CHILLS: 0
DOUBLE VISION: 0
SORE THROAT: 0
FEVER: 0
FLANK PAIN: 0
WEAKNESS: 0
DIZZINESS: 0
SHORTNESS OF BREATH: 0
BLURRED VISION: 0
COUGH: 0
PALPITATIONS: 0

## 2025-03-11 ASSESSMENT — COGNITIVE AND FUNCTIONAL STATUS - GENERAL
TOILETING: A LOT
MOVING FROM LYING ON BACK TO SITTING ON SIDE OF FLAT BED: A LITTLE
CLIMB 3 TO 5 STEPS WITH RAILING: A LOT
DRESSING REGULAR UPPER BODY CLOTHING: A LITTLE
SUGGESTED CMS G CODE MODIFIER DAILY ACTIVITY: CK
SUGGESTED CMS G CODE MODIFIER MOBILITY: CK
DRESSING REGULAR LOWER BODY CLOTHING: A LOT
STANDING UP FROM CHAIR USING ARMS: A LITTLE
WALKING IN HOSPITAL ROOM: A LITTLE
TURNING FROM BACK TO SIDE WHILE IN FLAT BAD: A LITTLE
DAILY ACTIVITIY SCORE: 17
MOBILITY SCORE: 17
HELP NEEDED FOR BATHING: A LOT
MOVING TO AND FROM BED TO CHAIR: A LITTLE

## 2025-03-11 ASSESSMENT — GAIT ASSESSMENTS
GAIT LEVEL OF ASSIST: CONTACT GUARD ASSIST
ASSISTIVE DEVICE: FRONT WHEEL WALKER
DEVIATION: BRADYKINETIC;SHUFFLED GAIT
DISTANCE (FEET): 25

## 2025-03-11 ASSESSMENT — PAIN DESCRIPTION - PAIN TYPE
TYPE: ACUTE PAIN

## 2025-03-11 ASSESSMENT — ACTIVITIES OF DAILY LIVING (ADL): TOILETING: INDEPENDENT

## 2025-03-11 NOTE — DISCHARGE PLANNING
Case Management Discharge Planning    Admission Date: 3/9/2025  GMLOS:    ALOS: 0    6-Clicks ADL Score: 18  6-Clicks Mobility Score: 8  PT and/or OT Eval ordered: Yes  Post-acute Referrals Ordered: No  Post-acute Choice Obtained: No  Has referral(s) been sent to post-acute provider:  No      Anticipated Discharge Dispo: Discharge Disposition: D/T to SNF with Medicare cert in anticipation of skilled care (03)    DME Needed: No    Action(s) Taken: Chart reviewed, pt discussed in am rounds. He is likely cleared to return to his group home, pending PT/OT evals.    Escalations Completed: None    Medically Clear: No    Next Steps: Follow for PT/OT recommendations    Barriers to Discharge: Medical clearance and Pending PT Evaluation    Is the patient up for discharge tomorrow: No

## 2025-03-11 NOTE — DISCHARGE PLANNING
Case Management Discharge Planning    Admission Date: 3/9/2025  GMLOS:    ALOS: 0    6-Clicks ADL Score: 18  6-Clicks Mobility Score: 17  PT and/or OT Eval ordered: Yes  Post-acute Referrals Ordered: Yes  Post-acute Choice Obtained: Yes  Has referral(s) been sent to post-acute provider:  Yes      Anticipated Discharge Dispo: Discharge Disposition: Discharged to home/self care (01)    DME Needed: No    Action(s) Taken: Chart reviewed, pt discussed in IDT rounds. Home health ordered. RN CM discussed with pt and Aga at the  and they were agreeable to send referral to [facility] as he has had them in the past and [facility]. Faxed to [facility]. Patient stating that he is in too  much pain to go home today and wouldn't be able to get around. RN CM updated MD.    Escalations Completed: None    Medically Clear: pending MD seeing pt    Next Steps: Set up transport once dc is verified    Barriers to Discharge: Medical clearance    Is the patient up for discharge tomorrow: No

## 2025-03-11 NOTE — THERAPY
Occupational Therapy   Initial Evaluation     Patient Name: Sina Lewis  Age:  84 y.o., Sex:  male  Medical Record #: [id number]  Today's Date: 3/11/2025     Precautions  Precautions: Fall Risk  Comments: LLE pain, no fx    Assessment  Patient is 84 y.o. male with a diagnosis of GLF, L hip, and knee pn.   Pt currently limited by decreased functional mobility, activity tolerance, cognition, sensation, strength, balance, which are affecting pt's ability to complete ADLs/IADLs at baseline. Pt would benefit from OT services in the acute care setting to maximize functional recovery.       Plan    Occupational Therapy Initial Treatment Plan   Treatment Interventions: (P) Self Care / Activities of Daily Living, Therapeutic Activity  Treatment Frequency: (P) 3 Times per Week  Duration: (P) Until Therapy Goals Met       Discharge Recommendations: (P) Recommend post-acute placement for additional occupational therapy services prior to discharge home (back to group home)        03/11/25 1110   Prior Living Situation   Prior Services None   Housing / Facility Group Home   Steps Into Home 0   Steps In Home 0   Equipment Owned Front-Wheel Walker   Lives with - Patient's Self Care Capacity Attendant / Paid Care Giver   Prior Level of ADL Function   Self Feeding Independent   Grooming / Hygiene Independent   Bathing Requires Assist   Dressing Independent   Toileting Independent   Strength Upper Body   Upper Body Strength  X   Comments 4/5 B UEs   Balance Assessment   Sitting Balance (Static) Fair -   Sitting Balance (Dynamic) Fair -   Standing Balance (Static) Fair -   Standing Balance (Dynamic) Poor +   Weight Shift Sitting Fair   Weight Shift Standing Fair   ADL Assessment   Grooming Supervision   Upper Body Dressing Minimal Assist   Lower Body Dressing Moderate Assist   Functional Mobility   Sit to Stand Contact Guard Assist   Bed, Chair, Wheelchair Transfer Contact Guard Assist   Short Term Goals   Short Term Goal # 1  supervised with UB dressing   Short Term Goal # 2 supervised with LB dressing   Short Term Goal # 3 supervised with ADL txfs   Occupational Therapy Initial Treatment Plan    Treatment Interventions Self Care / Activities of Daily Living;Therapeutic Activity   Treatment Frequency 3 Times per Week   Duration Until Therapy Goals Met   Anticipated Discharge Equipment and Recommendations   Discharge Recommendations Recommend post-acute placement for additional occupational therapy services prior to discharge home  (back to group home)

## 2025-03-11 NOTE — CARE PLAN
The patient is Stable - Low risk of patient condition declining or worsening    Shift Goals  Clinical Goals: Pain control throughout shift, maintain skin integrity  Patient Goals: Pain control  Family Goals: SILVIO    Pt pleasant and cooperative. Pain well-managed. Bed in locked, lowest position with alarms on.    Progress made toward(s) clinical / shift goals:    Problem: Pain - Standard  Goal: Alleviation of pain or a reduction in pain to the patient’s comfort goal  Outcome: Progressing     Problem: Skin Integrity  Goal: Skin integrity is maintained or improved  Outcome: Progressing     Problem: Fall Risk  Goal: Patient will remain free from falls  Outcome: Progressing       Patient is not progressing towards the following goals:      Problem: Knowledge Deficit - Standard  Goal: Patient and family/care givers will demonstrate understanding of plan of care, disease process/condition, diagnostic tests and medications  Outcome: Not Progressing

## 2025-03-11 NOTE — PROGRESS NOTES
4 Eyes Skin Assessment Completed by JANINE Stallings and JANINE Pizano.    Head WDL  Ears WDL  Nose WDL  Mouth WDL  Neck WDL  Breast/Chest WDL  Shoulder Blades WDL  Spine WDL  (R) Arm/Elbow/Hand Redness, Blanching, Bruising, and Abrasion, skin tear elbow  (L) Arm/Elbow/Hand Redness, Blanching, and Bruising  Abdomen WDL  Groin Redness and Blanching  Scrotum/Coccyx/Buttocks Redness and Blanching  (R) Leg WDL  (L) Leg Scab and Bruising  (R) Heel/Foot/Toe Redness and Blanching  (L) Heel/Foot/Toe Redness and Blanching          Devices In Places Condom Cath      Interventions In Place Heel Mepilex, TAP System, Pillows, Barrier Cream, and Heels Loaded W/Pillows    Possible Skin Injury Yes    Pictures Uploaded Into Epic Yes  Wound Consult Placed N/A  RN Wound Prevention Protocol Ordered Yes

## 2025-03-11 NOTE — CARE PLAN
The patient is Stable - Low risk of patient condition declining or worsening    Shift Goals  Clinical Goals: Pain will be <4 throughout shift, maintain skin integrity  Patient Goals: Rest  Family Goals: SILVIO    Patient a/o x3, disoriented to time. Patient walked with physical therapy using a walker. Patient stated pain 10/10 after that. Pain mediation was given. Call light and belongings are within reach. Bed is in low locked position.     Progress made toward(s) clinical / shift goals:    Problem: Pain - Standard  Goal: Alleviation of pain or a reduction in pain to the patient’s comfort goal  Outcome: Progressing     Problem: Knowledge Deficit - Standard  Goal: Patient and family/care givers will demonstrate understanding of plan of care, disease process/condition, diagnostic tests and medications  Outcome: Progressing     Problem: Skin Integrity  Goal: Skin integrity is maintained or improved  Outcome: Progressing     Problem: Fall Risk  Goal: Patient will remain free from falls  Outcome: Progressing       Patient is not progressing towards the following goals:

## 2025-03-11 NOTE — THERAPY
"Physical Therapy   Initial Evaluation     Patient Name: Sina Lewis  Age:  84 y.o., Sex:  male  Medical Record #: [id number]  Today's Date: 3/11/2025     Precautions  Precautions: Fall Risk  Comments: LLE pain, no fx    Assessment  Patient is an 84 y.o. male who was admitted with jaundice and elevated LFT's after multiple falls at home. PMH significant for HTN, CAD, hypothyroidism, dementia, NSTEMI.    Pt received in bed and agreeable to PT evaluation. Pt pleasant and reports living at a group home, where they can assist with mobility if needed. Pt presents with impaired functional mobility primarily due to c/o LLE pain along with generalized weakness. Pt required CGA to min A overall and ambulated 25ft with a FWW. Anticipate pt will be able to return to his group home with HHPT and increased assist based on reports. Will follow for acute PT to progress as able.    Plan    Physical Therapy Initial Treatment Plan   Treatment Plan : Bed Mobility, Gait Training, Neuro Re-Education / Balance, Self Care / Home Evaluation, Stair Training, Therapeutic Activities, Therapeutic Exercise  Treatment Frequency: 3 Times per Week  Duration: Until Therapy Goals Met    DC Equipment Recommendations: None  Discharge Recommendations:  (Anticipate return to group home if they can provide CGA to min A with mobility. Recommend HHPT.)       Subjective    \"My knee hurts right here\"     Objective       03/11/25 0947   Precautions   Precautions Fall Risk   Comments LLE pain, no fx   Vitals   O2 Delivery Device None - Room Air   Pain 0 - 10 Group   Therapist Pain Assessment Post Activity Pain Same as Prior to Activity;Nurse Notified  (reports LLE pain, not rated)   Prior Living Situation   Housing / Facility Group Home   Steps Into Home 0   Steps In Home 0   Equipment Owned Front-Wheel Walker   Lives with - Patient's Self Care Capacity Attendant / Paid Care Giver   Comments Pt pleasantly confused, but able to report living in a group " home and using a FWW. Reports he can get assist to walk if needed.   Prior Level of Functional Mobility   Comments Pt reports being independent with a FWW prior to the falls.   History of Falls   History of Falls Yes   Date of Last Fall   (related to admission)   Cognition    Level of Consciousness Alert   Comments Pleasant and cooperative. Perseverative on R knee pain in area of a small bruise near the proximal fibula   Passive ROM Lower Body   Passive ROM Lower Body WDL   Active ROM Lower Body    Active ROM Lower Body  WDL   Strength Lower Body   Comments BLE grossly 3+ to 4/5, no buckling in standing   Sensation Lower Body   Comments Denies LE sensory changes   Lower Body Muscle Tone   Lower Body Muscle Tone  WDL   Coordination Lower Body    Coordination Lower Body  WDL   Balance Assessment   Sitting Balance (Static) Fair -   Sitting Balance (Dynamic) Fair -   Standing Balance (Static) Fair -   Standing Balance (Dynamic) Poor +   Weight Shift Sitting Fair   Weight Shift Standing Fair   Comments FWW and CGA in standing   Bed Mobility    Supine to Sit Minimal Assist   Sit to Supine Standby Assist   Scooting Standby Assist   Comments extra time, HOB 20 deg   Gait Analysis   Gait Level Of Assist Contact Guard Assist   Assistive Device Front Wheel Walker   Distance (Feet) 25   # of Times Distance was Traveled 1   Deviation Bradykinetic;Shuffled Gait   Functional Mobility   Sit to Stand Contact Guard Assist   Bed, Chair, Wheelchair Transfer Contact Guard Assist   Mobility up with FWW   6 Clicks Assessment - How much HELP from from another person do you currently need... (If the patient hasn't done an activity recently, how much help from another person do you think he/she would need if he/she tried?)   Turning from your back to your side while in a flat bed without using bedrails? 3   Moving from lying on your back to sitting on the side of a flat bed without using bedrails? 3   Moving to and from a bed to a chair  (including a wheelchair)? 3   Standing up from a chair using your arms (e.g., wheelchair, or bedside chair)? 3   Walking in hospital room? 3   Climbing 3-5 steps with a railing? 2   6 clicks Mobility Score 17   Short Term Goals    Short Term Goal # 1 Pt will complete bed mobility with supervision from a flat bed to progress function in 6 visits.   Short Term Goal # 2 Pt will transfer with FWW and supervision to progress function in 6 visits.   Short Term Goal # 3 Pt will ambulate 100ft with FWW and supervision to progress function in 6 visits.   Education Group   Education Provided Role of Physical Therapist   Role of Physical Therapist Patient Response Patient;Acceptance;Explanation;Verbal Demonstration   Physical Therapy Initial Treatment Plan    Treatment Plan  Bed Mobility;Gait Training;Neuro Re-Education / Balance;Self Care / Home Evaluation;Stair Training;Therapeutic Activities;Therapeutic Exercise   Treatment Frequency 3 Times per Week   Duration Until Therapy Goals Met   Problem List    Problems Impaired Bed Mobility;Impaired Transfers;Impaired Ambulation;Functional Strength Deficit;Decreased Activity Tolerance;Pain   Anticipated Discharge Equipment and Recommendations   DC Equipment Recommendations None   Discharge Recommendations   (Anticipate return to group home if they can provide CGA to min A with mobility. Recommend HHPT.)   Interdisciplinary Plan of Care Collaboration   IDT Collaboration with  Nursing   Patient Position at End of Therapy In Bed;Bed Alarm On;Call Light within Reach;Tray Table within Reach;Phone within Reach   Collaboration Comments RN updated   Session Information   Date / Session Number  3/11-1 (1/3, 3/17)          19-Jun-2019 18:32

## 2025-03-11 NOTE — DIETARY
"Nutrition Services: Initial Assessment     Day 0 of admit. Sina Lewis is 84 y.o., male with admitting DX of Jaundice [R17].    Consult Received for: MST - Malnutrition Screening Tool    Pt admitted jaundice, dementia pmh CVA, HTN, heart failure, diverticulosis, hypothyroidism, HLD, CAD per EMR/provider note. Per provider note, pt with 30lb weight loss in unknown timeframe potentially due to depression and/or living conditions.     Nutrition Assessment:      Height: 175.3 cm (5' 9\")  Weight: 75.2 kg (165 lb 12.6 oz)  Weight taken via: Bed Scale  BMI Calculated: 24.48  BMI Classification: WNL       Weight Readings from Last 5 encounters:   Wt Readings from Last 5 Encounters:   03/09/25 75.2 kg (165 lb 12.6 oz)   03/09/25 75.2 kg (165 lb 12.6 oz)   11/15/24 95.7 kg (211 lb)   11/12/24 95.3 kg (210 lb)   10/26/24 103 kg (226 lb 10.1 oz)         Objective:   Pertinent Labs: Na 134  Pertinent Meds: levothyroxine, omeprazole, Seroquel, bowel regimen  Skin/Wounds:  no concerns noted  Food Allergies: NKFA  Last BM:  Type 1: Separate hard lumps (hard to pass)  03/09/25       Current Diet Order/Intake:   Cardiac diet    PO intake averages   25-50% x 1 meal  50-75% x 1 meal      Nutrition Focused Physical Exam (NFPE)  NFPE did not occur    Nutrition Diagnosis:      Increased nutrient needs related to increase demand as evidence by heart failure    Nutrition Interventions:      Resume current diet. Consider liberalization if PO intake inadequate.  Recommend Ensure Plus High Protein (provides 350 calories) 20 g protein per 8 fl oz) BID.  Recommend multivitamin  Patient aware of active plan of care as appropriate.       Nutrition Monitoring and Evaluation:      Monitor nutrition POC, goal for >75% intake from meals and supplements.  Additional fluids per MD/DO  Monitor vital signs pertinent to nutrition.    RD following and will provide updated recommendations as indicated.      Jossy Duarte R.D.                  "                        [facility]ITER[facility] FOR MALNUTRITION

## 2025-03-12 ENCOUNTER — APPOINTMENT (OUTPATIENT)
Dept: RADIOLOGY | Facility: MEDICAL CENTER | Age: 85
End: 2025-03-12
Attending: EMERGENCY MEDICINE
Payer: MEDICARE

## 2025-03-12 ENCOUNTER — HOSPITAL ENCOUNTER (EMERGENCY)
Facility: MEDICAL CENTER | Age: 85
End: 2025-03-13
Attending: EMERGENCY MEDICINE
Payer: MEDICARE

## 2025-03-12 ENCOUNTER — PHARMACY VISIT (OUTPATIENT)
Dept: PHARMACY | Facility: MEDICAL CENTER | Age: 85
End: 2025-03-12
Payer: COMMERCIAL

## 2025-03-12 VITALS
HEIGHT: 69 IN | DIASTOLIC BLOOD PRESSURE: 57 MMHG | OXYGEN SATURATION: 100 % | HEART RATE: 66 BPM | RESPIRATION RATE: 18 BRPM | TEMPERATURE: 96.8 F | WEIGHT: 165.79 LBS | SYSTOLIC BLOOD PRESSURE: 91 MMHG | BODY MASS INDEX: 24.56 KG/M2
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
## 2025-03-12 DIAGNOSIS — M25.552 LEFT HIP PAIN: ICD-10-CM

## 2025-03-12 LAB
ALBUMIN SERPL BCP-MCNC: 3 G/DL (ref 3.2–4.9)
ALBUMIN/GLOB SERPL: 0.9 G/DL
ALP SERPL-CCNC: 530 U/L (ref 30–99)
ALT SERPL-CCNC: 89 U/L (ref 2–50)
ANION GAP SERPL CALC-SCNC: 11 MMOL/L (ref 7–16)
ANISOCYTOSIS BLD QL SMEAR: ABNORMAL
AST SERPL-CCNC: 85 U/L (ref 12–45)
BASOPHILS # BLD AUTO: 0.9 % (ref 0–1.8)
BASOPHILS # BLD: 0.06 K/UL (ref 0–0.12)
BILIRUB SERPL-MCNC: 2 MG/DL (ref 0.1–1.5)
BUN SERPL-MCNC: 21 MG/DL (ref 8–22)
CALCIUM ALBUM COR SERPL-MCNC: 9.2 MG/DL (ref 8.5–10.5)
CALCIUM SERPL-MCNC: 8.4 MG/DL (ref 8.5–10.5)
CHLORIDE SERPL-SCNC: 100 MMOL/L (ref 96–112)
CO2 SERPL-SCNC: 23 MMOL/L (ref 20–33)
CREAT SERPL-MCNC: 1.45 MG/DL (ref 0.5–1.4)
EKG IMPRESSION: NORMAL
EOSINOPHIL # BLD AUTO: 0.06 K/UL (ref 0–0.51)
EOSINOPHIL NFR BLD: 0.9 % (ref 0–6.9)
ERYTHROCYTE [DISTWIDTH] IN BLOOD BY AUTOMATED COUNT: 57.6 FL (ref 35.9–50)
GFR SERPLBLD CREATININE-BSD FMLA CKD-EPI: 47 ML/MIN/1.73 M 2
GLOBULIN SER CALC-MCNC: 3.3 G/DL (ref 1.9–3.5)
GLUCOSE SERPL-MCNC: 92 MG/DL (ref 65–99)
HCT VFR BLD AUTO: 32.2 % (ref 42–52)
HGB BLD-MCNC: 10.3 G/DL (ref 14–18)
LYMPHOCYTES # BLD AUTO: 1.9 K/UL (ref 1–4.8)
LYMPHOCYTES NFR BLD: 28.4 % (ref 22–41)
MACROCYTES BLD QL SMEAR: ABNORMAL
MAGNESIUM SERPL-MCNC: 1.8 MG/DL (ref 1.5–2.5)
MANUAL DIFF BLD: NORMAL
MCH RBC QN AUTO: 34.9 PG (ref 27–33)
MCHC RBC AUTO-ENTMCNC: 32 G/DL (ref 32.3–36.5)
MCV RBC AUTO: 109.2 FL (ref 81.4–97.8)
METAMYELOCYTES NFR BLD MANUAL: 1.7 %
MONOCYTES # BLD AUTO: 0.64 K/UL (ref 0–0.85)
MONOCYTES NFR BLD AUTO: 9.5 % (ref 0–13.4)
MORPHOLOGY BLD-IMP: NORMAL
MYELOCYTES NFR BLD MANUAL: 1.7 %
NEUTROPHILS # BLD AUTO: 3.81 K/UL (ref 1.82–7.42)
NEUTROPHILS NFR BLD: 56.9 % (ref 44–72)
NRBC # BLD AUTO: 0 K/UL
NRBC BLD-RTO: 0 /100 WBC (ref 0–0.2)
PLATELET # BLD AUTO: 147 K/UL (ref 164–446)
PLATELET BLD QL SMEAR: NORMAL
PMV BLD AUTO: 10 FL (ref 9–12.9)
POIKILOCYTOSIS BLD QL SMEAR: NORMAL
POTASSIUM SERPL-SCNC: 4.2 MMOL/L (ref 3.6–5.5)
PROT SERPL-MCNC: 6.3 G/DL (ref 6–8.2)
RBC # BLD AUTO: 2.95 M/UL (ref 4.7–6.1)
RBC BLD AUTO: PRESENT
SODIUM SERPL-SCNC: 134 MMOL/L (ref 135–145)
TARGETS BLD QL SMEAR: NORMAL
TROPONIN T SERPL-MCNC: 26 NG/L (ref 6–19)
TROPONIN T SERPL-MCNC: 28 NG/L (ref 6–19)
WBC # BLD AUTO: 6.7 K/UL (ref 4.8–10.8)

## 2025-03-12 PROCEDURE — 73501 X-RAY EXAM HIP UNI 1 VIEW: CPT | Mod: LT

## 2025-03-12 PROCEDURE — 80053 COMPREHEN METABOLIC PANEL: CPT

## 2025-03-12 PROCEDURE — 93005 ELECTROCARDIOGRAM TRACING: CPT | Mod: TC | Performed by: EMERGENCY MEDICINE

## 2025-03-12 PROCEDURE — 700102 HCHG RX REV CODE 250 W/ 637 OVERRIDE(OP): Mod: UD | Performed by: HOSPITALIST

## 2025-03-12 PROCEDURE — RXMED WILLOW AMBULATORY MEDICATION CHARGE: Performed by: STUDENT IN AN ORGANIZED HEALTH CARE EDUCATION/TRAINING PROGRAM

## 2025-03-12 PROCEDURE — 99239 HOSP IP/OBS DSCHRG MGMT >30: CPT | Performed by: STUDENT IN AN ORGANIZED HEALTH CARE EDUCATION/TRAINING PROGRAM

## 2025-03-12 PROCEDURE — 85007 BL SMEAR W/DIFF WBC COUNT: CPT

## 2025-03-12 PROCEDURE — A9270 NON-COVERED ITEM OR SERVICE: HCPCS | Mod: UD | Performed by: HOSPITALIST

## 2025-03-12 PROCEDURE — 72125 CT NECK SPINE W/O DYE: CPT

## 2025-03-12 PROCEDURE — G0378 HOSPITAL OBSERVATION PER HR: HCPCS

## 2025-03-12 PROCEDURE — 99285 EMERGENCY DEPT VISIT HI MDM: CPT

## 2025-03-12 PROCEDURE — 84484 ASSAY OF TROPONIN QUANT: CPT | Mod: 91

## 2025-03-12 PROCEDURE — 700102 HCHG RX REV CODE 250 W/ 637 OVERRIDE(OP): Mod: UD | Performed by: STUDENT IN AN ORGANIZED HEALTH CARE EDUCATION/TRAINING PROGRAM

## 2025-03-12 PROCEDURE — A9270 NON-COVERED ITEM OR SERVICE: HCPCS | Mod: UD | Performed by: STUDENT IN AN ORGANIZED HEALTH CARE EDUCATION/TRAINING PROGRAM

## 2025-03-12 PROCEDURE — 700105 HCHG RX REV CODE 258: Mod: UD | Performed by: EMERGENCY MEDICINE

## 2025-03-12 PROCEDURE — 85027 COMPLETE CBC AUTOMATED: CPT

## 2025-03-12 PROCEDURE — 36415 COLL VENOUS BLD VENIPUNCTURE: CPT

## 2025-03-12 PROCEDURE — 83735 ASSAY OF MAGNESIUM: CPT

## 2025-03-12 PROCEDURE — 70450 CT HEAD/BRAIN W/O DYE: CPT

## 2025-03-12 RX ORDER — OXYCODONE HYDROCHLORIDE 5 MG/1
5 TABLET ORAL EVERY 6 HOURS PRN
Qty: 15 TABLET | Refills: 0 | Status: SHIPPED | OUTPATIENT
Start: 2025-03-12 | End: 2025-03-17

## 2025-03-12 RX ORDER — SODIUM CHLORIDE 9 MG/ML
500 INJECTION, SOLUTION INTRAVENOUS ONCE
Status: COMPLETED | OUTPATIENT
Start: 2025-03-12 | End: 2025-03-13

## 2025-03-12 RX ADMIN — SODIUM CHLORIDE 500 ML: 9 INJECTION, SOLUTION INTRAVENOUS at 22:19

## 2025-03-12 RX ADMIN — TAMSULOSIN HYDROCHLORIDE 0.4 MG: 0.4 CAPSULE ORAL at 05:51

## 2025-03-12 RX ADMIN — DIVALPROEX SODIUM 250 MG: 250 TABLET, DELAYED RELEASE ORAL at 07:34

## 2025-03-12 RX ADMIN — OMEPRAZOLE 40 MG: 20 CAPSULE, DELAYED RELEASE ORAL at 07:34

## 2025-03-12 RX ADMIN — ASPIRIN 81 MG: 81 TABLET, COATED ORAL at 05:51

## 2025-03-12 RX ADMIN — TRIAMCINOLONE ACETONIDE 1 APPLICATION: 1 CREAM TOPICAL at 07:36

## 2025-03-12 RX ADMIN — LEVOTHYROXINE SODIUM 88 MCG: 0.09 TABLET ORAL at 05:51

## 2025-03-12 RX ADMIN — BUSPIRONE HYDROCHLORIDE 5 MG: 10 TABLET ORAL at 05:51

## 2025-03-12 RX ADMIN — OXYCODONE HYDROCHLORIDE 10 MG: 10 TABLET ORAL at 07:34

## 2025-03-12 ASSESSMENT — PAIN DESCRIPTION - PAIN TYPE: TYPE: ACUTE PAIN

## 2025-03-12 ASSESSMENT — FIBROSIS 4 INDEX: FIB4 SCORE: 7.16

## 2025-03-12 NOTE — DISCHARGE PLANNING
DC Transport Scheduled    Transport Company Scheduled:  [employer] [facility]    Scheduled Date: 3/12/2025  Scheduled Time: 1330    Transport Type: Wheelchair  Destination:   [facility]   Destination address: [facility] [address]; [place] [address]    Notified care team of scheduled transport via [employer].     If there are any changes needed to the DC transportation scheduled, please contact [facility] at ext. [phone] between the hours of 7162-2219. If outside those hours, contact the ED Case Manager at ext. [phone].

## 2025-03-12 NOTE — DISCHARGE SUMMARY
Discharge Summary    CHIEF COMPLAINT ON ADMISSION  Chief Complaint   Patient presents with    Fall     Pt reports a mechanical glf. Reporting left hip, left knee, and right elbow pain. -head strike, -thinners       Reason for Admission  ems     Admission Date  3/9/2025    CODE STATUS  DNAR/DNI    HPI & HOSPITAL COURSE  Sina Lewis is a 84 y.o. male with a past medical history of with past medical history of stroke, dementia, hypertension, heart failure, diverticulosis, hypothyroidism who presented 3/9/2025 with mechanical ground level fall.  Patient presented from group home for fall in the bathroom, reported left hip pain. He underwent traumatic survey showing no acute fractures or abnormalities. PT recommend home health services, family okay with patient discharging back to group home with home health which has been ordered. Incidentally patient noted to have elevated liver function tests including elevated AST, ALT, bilirubin. Patient with history of gallstones, cholecystitis. Right upper quadrant ultrasound here does not show any acute cholecystitis or bile duct dilation. Patients liver function tests improved with supportive care. Patient denies any abdominal discomfort. Patient may pursue outpatient MRI of biliary system/liver for further evaluation, this may be arranged by PCP. Patient given pain medications for left hip pain where he fell, he is able to ambulate with therapist and walker. He is to follow up with his PCP.    Therefore, he is discharged in fair and stable condition to home with organized home healthcare and close outpatient follow-up.        Discharge Date  3/12/2025    FOLLOW UP ITEMS POST DISCHARGE  Take medications as prescribed.  Follow up with PCP.    DISCHARGE DIAGNOSES  Principal Problem:    Jaundice (POA: Yes)  Active Problems:    HTN (hypertension) (POA: Yes)    Hyperlipidemia (POA: Yes)    CAD (coronary artery disease) (POA: Yes)    Hypothyroidism (POA: Yes)     Diverticulosis (POA: Yes)    Frequent falls (POA: Yes)    History of stroke (POA: Yes)    Dementia (HCC) (POA: Yes)    Elevated LFTs (POA: Yes)  Resolved Problems:    * No resolved hospital problems. *      FOLLOW UP  No future appointments.  [facility] [place] [address] [facility] [address]  [phone]          MEDICATIONS ON DISCHARGE     Medication List        START taking these medications        Instructions   oxyCODONE immediate-release 5 MG Tabs  Commonly known as: Roxicodone   Take 1 Tablet by mouth every 6 hours as needed for Severe Pain for up to 5 days.  Dose: 5 mg            CONTINUE taking these medications        Instructions   acetaminophen 500 MG Tabs  Commonly known as: Tylenol   Take 500 mg by mouth every 6 hours as needed for Mild Pain.  Dose: 500 mg     Aspercreme Lidocaine 4 % Ptch  Generic drug: lidocaine   Place 1 Patch on the skin 1 time a day as needed.  Dose: 1 Patch     aspirin 81 MG EC tablet   Take 81 mg by mouth every day.  Dose: 81 mg     benazepril 10 MG Tabs  Commonly known as: Lotensin   Take 10 mg by mouth at bedtime.  Dose: 10 mg     busPIRone 5 MG tablet  Commonly known as: Buspar   Take 5 mg by mouth 3 times a day.  Dose: 5 mg     dextromethorphan polistirex ER 30 mg/5 mL Suer  Commonly known as: Delsym   Take 30 mg by mouth every 8 hours as needed for Cough.  Dose: 30 mg     divalproex 250 MG Tbec  Commonly known as: Depakote   Take 250 mg by mouth 3 times a day.  Dose: 250 mg     levothyroxine 88 MCG Tabs  Commonly known as: Synthroid   Take 88 mcg by mouth every morning on an empty stomach.  Dose: 88 mcg     LORazepam 0.5 MG Tabs  Commonly known as: Ativan   Take 0.5 mg by mouth 4 times a day as needed for Anxiety.  Dose: 0.5 mg     magnesium hydroxide 400 MG/5ML Susp  Commonly known as: Milk Of Magnesia   Take 30 mL by mouth 1 time a day as needed (no BM for 3 days).  Dose: 30 mL     Melatonin 10 MG Tabs   Take 10 mg by mouth at bedtime.  Dose: 10 mg      omeprazole 40 MG delayed-release capsule  Commonly known as: PriLOSEC   Take 40 mg by mouth 2 times a day.  Dose: 40 mg     quetiapine 300 MG tablet  Commonly known as: SEROquel   Take 300 mg by mouth at bedtime.  Dose: 300 mg     senna-docusate 8.6-50 MG Tabs  Commonly known as: Pericolace Or Senokot S   Take 1 Tablet by mouth 2 times a day. Hold for loose stools  Dose: 1 Tablet     sennosides 8.6 MG Tabs  Commonly known as: Senokot   Take 8.6 mg by mouth 2 times a day as needed (constipation). DISCONTINUED BY PROVIDER  Indications: Constipation  Dose: 8.6 mg     tamsulosin 0.4 MG capsule  Commonly known as: Flomax   Take 0.4 mg by mouth every day.  Dose: 0.4 mg     triamcinolone acetonide 0.1 % Crea  Commonly known as: Kenalog   Apply 1 Application topically 2 times a day. For rashes  Dose: 1 Application     Zofran 4 MG Tabs tablet  Generic drug: ondansetron   Take 4 mg by mouth every four hours as needed for Nausea/Vomiting.  Dose: 4 mg              Allergies  Allergies   Allergen Reactions    Nkda [No Known Drug Allergy]        DIET  Orders Placed This Encounter   Procedures    Diet Order Diet: Cardiac     Standing Status:   Standing     Number of Occurrences:   1     Diet::   Cardiac [6]       ACTIVITY  As tolerated.  Weight bearing as tolerated    CONSULTATIONS  none    PROCEDURES  none    LABORATORY  Lab Results   Component Value Date    SODIUM 134 (L) 03/11/2025    POTASSIUM 3.8 03/11/2025    CHLORIDE 101 03/11/2025    CO2 23 03/11/2025    GLUCOSE 91 03/11/2025    BUN 17 03/11/2025    CREATININE 1.35 03/11/2025    CREATININE 1.7 (H) 03/27/2009        Lab Results   Component Value Date    WBC 5.3 03/11/2025    HEMOGLOBIN 9.6 (L) 03/11/2025    HEMATOCRIT 29.8 (L) 03/11/2025    PLATELETCT 112 (L) 03/11/2025        Total time of the discharge process exceeds 33 minutes.

## 2025-03-12 NOTE — PROGRESS NOTES
[facility] Medicine Daily Progress Note    Date of Service  3/11/2025    Chief Complaint  Sina Lewis is a 84 y.o. male admitted 3/9/2025 with mechanical ground-level fall.    Hospital Course  Sina Lewis is a 84 y.o. male with a past medical history of with past medical history of stroke, hypertension, heart failure, diverticulosis, hypothyroidism who presented 3/9/2025 with mechanical ground level fall.    Patient came today from group home. Patient apparently had a mechanical fall he was sitting at the edge of the bathroom and he slipped off. He denies any head injury but he complaining of left hip and left knee right shoulder pain. Patient has history of dementia but he is able to speak in full sentences and gives on information although has very severe memory impairment. He notes that he lives here in [place] but he does not know exactly where he knows that he is in a facility/group home. Patient has a niece who is also helping him and he watching every day. Patient was noticed to be jaundiced, bilirubin is elevated, patient stated that he has noticed a mass in his abdomen on palpation but has not had any recent imaging studies.     Patient was here in October last year for possible acute cholecystitis, patient had HIDA scan that was negative and general surgery recommended medical management at that time, CT abdomen in October 2024 showed no acute findings, cholelithiasis, no findings on biliary system or liver or spleen or pancreas.    In the emergency department vital signs stable.  Labs significant for hemoglobin 10.3, , , alkaline phosphatase 579, total bilirubin 4.2, direct bilirubin 3.0, indirect bilirubin 1.2, albumin 3.0.  CT chest abdomen pelvis showing cholelithiasis with no intrahepatic or extrahepatic biliary dilation.  X-ray of the hip shows no acute processes.  Right upper quadrant ultrasound shows cholelithiasis with no sonographic evidence of acute  cholecystitis.    Patient seen and examined this a.m.  He currently is denying any abdominal pain.  Additionally he states that he has a mass in his abdomen with during palpation when we pressed over the sternum he said that is where the masses.  Educated patient that that was part of his sternum.  Discussed with the patient that he potentially had a stone that has since passes laboratory is starting to improve.  Discussed with the patient that we will transfer him and follow-up on labs prior to any further imaging or intervention.  Physical therapy has been ordered.    Interval Problem Update  No acute events overnight.  Patient reports left hip pain, worse with ambulation.  Hip x ray reviewed showing normal findings, normal appearing left total hip arthroplasty. Patient denies ever having hip replacement.  He states he cannot walk either due to uncontrolled pain.  Per PT note, patient able to ambulate 25 ft with CGA to minimum assist. PT recommend home health for patient. OT recommend post acute services.  Oxycodone dose increased to 10 mg prn for pain control.  Anticipate if pain is better controlled, patient can discharge back to [facility] with home health tomorrow. Home health ordered.        I have discussed this patient's plan of care and discharge plan at IDT rounds today with Case Management, Nursing, Nursing leadership, and other members of the IDT team.    Consultants/Specialty  None    Code Status  DNAR/DNI    Disposition  Medically Cleared  I have placed the appropriate orders for post-discharge needs.    Review of Systems  Review of Systems   Constitutional:  Negative for chills, fever and malaise/fatigue.   HENT:  Negative for congestion and sore throat.    Eyes:  Negative for blurred vision and double vision.   Respiratory:  Negative for cough and shortness of breath.    Cardiovascular:  Negative for chest pain, palpitations and leg swelling.   Gastrointestinal:  Negative for abdominal pain, nausea  and vomiting.   Genitourinary:  Negative for dysuria and flank pain.   Musculoskeletal:  Negative for falls and myalgias.        LLE pain   Skin:  Negative for itching and rash.   Neurological:  Negative for dizziness, weakness and headaches.   Psychiatric/Behavioral:  Negative for depression and suicidal ideas.         Physical Exam  Temp:  [36.2 °C (97.1 °F)-36.6 °C (97.9 °F)] 36.6 °C (97.8 °F)  Pulse:  [66-86] 72  Resp:  [17-19] 18  BP: (102-162)/(44-95) 106/58  SpO2:  [90 %-98 %] 98 %    Physical Exam  Constitutional:       Appearance: Normal appearance.   HENT:      Head: Normocephalic and atraumatic.      Nose: Nose normal.      Mouth/Throat:      Mouth: Mucous membranes are moist.   Eyes:      Pupils: Pupils are equal, round, and reactive to light.   Cardiovascular:      Rate and Rhythm: Normal rate and regular rhythm.      Pulses: Normal pulses.      Heart sounds: Normal heart sounds.   Pulmonary:      Effort: Pulmonary effort is normal.      Breath sounds: Normal breath sounds.   Abdominal:      General: Bowel sounds are normal.      Palpations: Abdomen is soft.   Musculoskeletal:         General: Normal range of motion.      Cervical back: Normal range of motion.   Skin:     Coloration: Skin is jaundiced.   Neurological:      Mental Status: He is alert. Mental status is at baseline.      Comments: Dementa history   Psychiatric:         Mood and Affect: Mood normal.         Behavior: Behavior normal.         Fluids    Intake/Output Summary (Last 24 hours) at 3/11/2025 1942  Last data filed at 3/11/2025 1400  Gross per 24 hour   Intake 680 ml   Output 900 ml   Net -220 ml        Laboratory  Recent Labs     03/09/25  0950 03/10/25  0111 03/11/25  0202   WBC 6.5 6.3 5.3   RBC 2.93* 2.62* 2.78*   HEMOGLOBIN 10.3* 9.3* 9.6*   HEMATOCRIT 31.8* 27.9* 29.8*   .5* 106.5* 107.2*   MCH 35.2* 35.5* 34.5*   MCHC 32.4 33.3 32.2*   RDW 56.2* 53.4* 55.4*   PLATELETCT 100* 94* 112*   MPV 10.8 11.0 10.6     Recent  Labs     03/09/25  0950 03/10/25  0111 03/11/25  0202   SODIUM 135 133* 134*   POTASSIUM 3.8 3.6 3.8   CHLORIDE 103 102 101   CO2 23 20 23   GLUCOSE 84 112* 91   BUN 20 19 17   CREATININE 1.26 1.32 1.35   CALCIUM 8.5 8.1* 8.2*     Recent Labs     03/09/25  1030   INR 1.06         Recent Labs     03/11/25  0202   TRIGLYCERIDE 125   HDL 33*   *       Imaging  CT-CHEST,ABDOMEN,PELVIS WITH   Final Result      1. Cholelithiasis.   2. No intrahepatic or extrahepatic biliary dilation.   3. Pancreas is normal.   4. No retroperitoneal adenopathy.   5. Simple appearing bilateral renal cortical cysts, requiring no further imaging follow-up.   6. Atherosclerosis.      US-RUQ   Final Result      1. Cholelithiasis. No sonographic evidence of acute cholecystitis.   2. Lobular contour of the right kidney with a simple appearing upper pole right renal cortical cyst measuring 3.9 cm in diameter.   3. The remainder of the right upper quadrant abdominal sonogram is within normal limits.      DX-HIP-UNILATERAL-WITH PELVIS-1 VIEW LEFT   Final Result         1. No acute processes.   2. Left JACKIE is unremarkable.   3. Osteopenia.           Assessment/Plan  * Jaundice- (present on admission)  Assessment & Plan  Painless jaundice likely secondary to stone that has since passed as laboratory is starting to improve  RUQ US showing cholelithiasis with no evidence of cholecystitis  CT abdomen/pelvis showing cholelithiasis with no intrahepatic or extrahepatic biliary dilation  Trend CMP in the am    Elevated LFTs- (present on admission)  Assessment & Plan  RUQ US showing cholelithiasis with no evidence of cholecystitis  CT abdomen/pelvis showing cholelithiasis with no intrahepatic or extrahepatic biliary dilation  No abdominal pain  Was seen 10/2024 for possible acute rickie but hida scan was neg and gen surgery recommended no intervention.     Dementia (HCC)- (present on admission)  Assessment & Plan  At baseline  Able to answer questions.    No agitation.     History of stroke- (present on admission)  Assessment & Plan  No new neuro deficit.  Continue monitoring.     Frequent falls- (present on admission)  Assessment & Plan  Had a mechanical fall today  As per patient he slip from the edge of the bathroom  No head trauma,has superficial skin tears treated in the ER.   PT ordered    Diverticulosis- (present on admission)  Assessment & Plan  No abdominal pain  Monitoring  No h/o bleeding.     Hypothyroidism- (present on admission)  Assessment & Plan  Continue levothyroxine    CAD (coronary artery disease)- (present on admission)  Assessment & Plan  No chest pain   Continue monitoring  Continue home medications.     Hyperlipidemia- (present on admission)  Assessment & Plan  Lipid panel in am    HTN (hypertension)- (present on admission)  Assessment & Plan  Continue home meds         VTE prophylaxis: VTE Selection

## 2025-03-12 NOTE — DISCHARGE PLANNING
Case Management Discharge Planning    Admission Date: 3/9/2025  GMLOS:    ALOS: 0    6-Clicks ADL Score: 17  6-Clicks Mobility Score: 17  PT and/or OT Eval ordered: Yes  Post-acute Referrals Ordered: Yes  Post-acute Choice Obtained: Yes  Has referral(s) been sent to post-acute provider:  Yes      Anticipated Discharge Dispo: Discharge Disposition: D/T to home under [facility] care in anticipation of covered skilled care (06)    DME Needed: No    Action(s) Taken: Updated Provider/Nurse on Discharge Plan  Discussed in discharge rounds. Patient is medically clear for transfer back to group home.  @0930 Contacted [facility],   and verified they can provide CGA to [facility]. [facility] verified they could.   I contacted patient's niece/POA and obtained permission to transfer patient back to group home.    @0985 Voalte message to attending and bedside RN informing them of okay to send patient back. I will set up wc transport for patient, but I need a time he will be ready.    New Medications can be sent to [facility] or meds to beds and sent home with patient if this is okay to do.     @1300 I discussed discharge with patient. He states he can't walk.   I discussed this with attending. He stated patient can walk, has dementia. Okay for DC.     @1325 Transport was set up through [employer] for 1345.    [facility]per Dooley at the  notified of departure time from [facility].  VMM left for POA notifying her of departure time.  Discharge summary provided in packet to go with patient to group home.  Meds to beds are being picked up by bedside RN.     Escalations Completed: Provider    Medically Clear: Yes    Next Steps: set up transport    Barriers to Discharge: Transportation    Is the patient up for discharge tomorrow: No - today

## 2025-03-12 NOTE — DISCHARGE SUMMARY
Case Management Discharge Planning    Admission Date: 3/9/2025  GMLOS:    ALOS: 0    6-Clicks ADL Score: 17  6-Clicks Mobility Score: 17  PT and/or OT Eval ordered: Yes  Post-acute Referrals Ordered: Yes  Post-acute Choice Obtained: Yes  Has referral(s) been sent to post-acute provider:  Yes      Anticipated Discharge Dispo: Discharge Disposition: D/T to home under [facility] care in anticipation of covered skilled care (06)    DME Needed: No    Action(s) Taken: Updated Provider/Nurse on Discharge Plan  Discussed in discharge rounds. Patient is medically clear for transfer back to group home.  @5099 Contacted [facility],   and verified they can provide CGA to [facility]. [facility]per Dooley verified they could.   I contacted patient's niece/POA and obtained permission to transfer patient back to group home.    @3456 Voalte message to attending and bedside RN informing them of okay to send patient back. I will set up wc transport for patient, but I need a time he will be ready.    New Medications can be sent to [facility] or meds to beds and sent home with patient if this is okay to do.   Escalations Completed: Provider    Medically Clear: Yes    Next Steps: set up transport    Barriers to Discharge: Transportation    Is the patient up for discharge tomorrow: No - today

## 2025-03-13 VITALS
SYSTOLIC BLOOD PRESSURE: 97 MMHG | RESPIRATION RATE: 16 BRPM | BODY MASS INDEX: 28.44 KG/M2 | HEART RATE: 77 BPM | WEIGHT: 192 LBS | OXYGEN SATURATION: 90 % | DIASTOLIC BLOOD PRESSURE: 54 MMHG | HEIGHT: 69 IN | TEMPERATURE: 97.6 F

## 2025-03-13 NOTE — ED NOTES
PT walks with walker baseline. PT able to get out of bed with no assistance needed. PT able to stand on his own with walker , and able to walk outside of room with assistance needed. Just had pain in knee from fall.

## 2025-03-13 NOTE — ED TRIAGE NOTES
Chief Complaint   Patient presents with    T-5000 GLF     Pt reports GLF today  - head trauma  - LOC  + ASA    Hip Injury     L hip secondary to fall     Pt JARON DO from his group home. Pt reports multiple falls over the last several days. On arrival, pt is alert and oriented X 3 (disoriented to time), speaking in full sentences, follows commands and responds appropriately to questions. NAD. Resp are even and unlabored.

## 2025-03-13 NOTE — DISCHARGE PLANNING
SW arranged WC & 0L O2 transport via [facility] [place] [facility] [address] [place] [address]. Bedside RN updated. COBRA packet not needed.  Time: 1898-8110  Reservation: [id number]  Price: $137.76    Pt ambulates with a walker and was BIB EMS without walker. Pt requires assistance to his group him so WC transport arranged for a safe dc.

## 2025-03-13 NOTE — DISCHARGE INSTRUCTIONS
Take precaution to prevent further falls at home.  If you are continuing to have ongoing hip pain follow-up with your primary doctor but your x-rays today look good.

## 2025-03-13 NOTE — ED NOTES
Patient transported back to group home by [facility]. PIV removed, catheter intact. Discharge education provided. Discharge paperwork reviewed with pt. All questions answered. All belongings with pt.

## 2025-03-13 NOTE — ED PROVIDER NOTES
ED Provider Note    CHIEF COMPLAINT  Chief Complaint   Patient presents with    T-5000 GLF     Pt reports GLF today  - head trauma  - LOC  + ASA    Hip Injury     L hip secondary to fall       EXTERNAL RECORDS REVIEWED  Recently admitted 3/9-today for ground-level fall at group home, had x-ray of the hip, CT chest/abdomen/pelvis, no traumatic injuries identified.  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  Had cholelithiasis without cholecystitis, outpatient workup recommended.    HPI/ROS  LIMITATION TO HISTORY   Select: : None  OUTSIDE HISTORIAN(S):  None    Sina Lewis is a 84 y.o. male who presents to the ER with left hip pain after ground-level fall.  Reports when getting out of the tub slightly slipped and fell down hitting the left side of his body and he may have hit his head but he is not sure.  No loss of consciousness.  He states he has been having increasing falls recently.    PAST MEDICAL HISTORY   has a past medical history of Abdominal pain, bilateral lower quadrant, Acute MI (HCC), Arrhythmia, Arthritis, Backpain, Bronchitis, Congestive heart failure (HCC), Diverticulitis, Heart murmur, Hypertension, Pneumonia, Psychiatric problem, and Thyroid disorder.    SURGICAL HISTORY   has a past surgical history that includes other cardiac surgery and hernia repair.    FAMILY HISTORY  History reviewed. No pertinent family history.    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 (Registered Nurse) on 25 at 1956  Med List Status:  "Partial     Medication Last Dose Status   acetaminophen (TYLENOL) 500 MG Tab  Active   aspirin 81 MG EC tablet  Active   benazepril (LOTENSIN) 10 MG Tab  Active   busPIRone (BUSPAR) 5 MG tablet  Active   Dextromethorphan Polistirex ER (DELSYM) 30 MG/5ML Suspension Extended Release  Active   divalproex (DEPAKOTE) 250 MG Tablet Delayed Response  Active   levothyroxine (SYNTHROID) 88 MCG Tab  Active   lidocaine (ASPERCREME LIDOCAINE) 4 % Patch  Active   LORazepam (ATIVAN) 0.5 MG Tab  Active   magnesium hydroxide (MILK OF MAGNESIA) 400 MG/5ML Suspension  Active   Melatonin 10 MG Tab  Active   omeprazole (PRILOSEC) 40 MG delayed-release capsule  Active   ondansetron (ZOFRAN) 4 MG Tab tablet  Active   oxyCODONE immediate-release (ROXICODONE) 5 MG Tab  Active   quetiapine (SEROQUEL) 300 MG tablet  Active   senna-docusate (PERICOLACE OR SENOKOT S) 8.6-50 MG Tab  Active   sennosides (SENOKOT) 8.6 MG Tab  Active   tamsulosin (FLOMAX) 0.4 MG capsule  Active   triamcinolone acetonide (KENALOG) 0.1 % Cream  Active                  Audit from Redirected Encounters    **Home medications have not yet been reviewed for this encounter**         ALLERGIES  Allergies   Allergen Reactions    Nkda [No Known Drug Allergy]        PHYSICAL EXAM  VITAL SIGNS: BP 96/54   Pulse 84   Temp 36.4 °C (97.5 °F)   Resp 18   Ht 1.753 m (5' 9\")   Wt 87.1 kg (192 lb)   SpO2 93%   BMI 28.35 kg/m²    General: Lying calmly in stretcher, no acute distress  HEENT: NCAT, moist mucous membranes, normal conjunctiva, pupils equal and reactive  Neck: No C-spine tenderness  CV: Regular rate rhythm, no murmurs, 2+ radial pulses  Pulmonary: CTA B normal work of breathing  Abdomen: Soft nondistended nontender  Pelvis: Stable nontender to AP and lateral compression   MSK: No deformities in extremities, no swelling, 1+ DP pulses bilaterally    EKG/LABS  Troponin 28, 26 on repeat.  CMP stable CKD creatinine 1.45.  Mild hyponatremia 134.  Stable transaminitis and " hyperbilirubinemia.  CBC without leukocytosis.  Stable macrocytic anemia hemoglobin 10.3.    EKG at 2026 NSR rate 89 borderline left axis deviation, no STEMI, T wave flattening in 1 and aVL, Q wave in aVL, similar to prior.  I have independently interpreted this EKG    RADIOLOGY/PROCEDURES   I have independently interpreted the diagnostic imaging associated with this visit and am waiting the final reading from the radiologist.   My preliminary interpretation is as follows: CT head without acute traumatic injuries, no bleed.  Chronic white matter changes.  CT C-spine without acute traumatic injuries.  Normal left total hip arthroplasty on hip x-ray, no evidence of acute traumatic injury.    Radiologist interpretation:  CT-CSPINE WITHOUT PLUS RECONS   Final Result         1.  Multilevel degenerative changes of the cervical spine limit diagnostic sensitivity of this examination, otherwise no acute traumatic bony injury of the cervical spine is apparent.   2.  Atherosclerosis      CT-HEAD W/O   Final Result         1.  No acute intracranial abnormality is identified, there are nonspecific white matter changes, commonly associated with small vessel ischemic disease.  Associated mild cerebral atrophy is noted.   2.  Atherosclerosis.               DX-HIP-UNILATERAL-WITH PELVIS-1 VIEW LEFT   Final Result         1.  No radiographic evidence of acute traumatic injury.   2.  Atherosclerosis          COURSE & MEDICAL DECISION MAKING    ASSESSMENT, COURSE AND PLAN  Care Narrative: Differential includes hip fracture, dislocation, electrolyte abnormality, intracranial bleed, skull fracture    On arrival patient is overall well-appearing, resting comfortably, alert and oriented with no focal neurologic deficits and no obvious external signs of trauma.  Differential above considered.  No acute interventions needed at this time.  EKG did not show any acute ischemic changes, no arrhythmia.  X-ray of the hip did not show any acute  traumatic injuries.  CT of the head and C-spine did not show any acute traumatic injuries, chronic white matter changes.  Labs overall reassuring and stable, stable anemia, CKD and transaminitis.  Troponin elevated 28, repeat 26, unlikely ACS.  While patient was resting, blood pressure did temporarily drop systolics 80s but he was asymptomatic throughout this.  Received 500 mL IV fluid bolus with improvement in blood pressure.  After this he was ambulated successfully does not have any dizziness or cardiorespiratory symptoms.  Blood pressure stable.  Thus he was deemed safe for discharge back to his group home.            DISPOSITION AND DISCUSSIONS  I have discussed management of the patient with the following physicians and BRITTNEY's: N/A    Discussion of management with other QHP or appropriate source(s): None     Escalation of care considered, and ultimately not performed: None    Barriers to care at this time, including but not limited to: None.     Decision tools and prescription drugs considered including, but not limited to: None needed, can take over-the-counter analgesics if needed.    FINAL DIAGNOSIS  1. Fall, initial encounter    2. Left hip pain         Electronically signed by: Suman Magdaleno M.D., 3/12/2025 8:02 PM

## 2025-03-13 NOTE — ED NOTES
Patient's group home contacted and they are able to assist him back to his room on arrival.  SW contacted to set up transportation back to group home for patient.

## 2025-07-14 ENCOUNTER — HOSPITAL ENCOUNTER (EMERGENCY)
Facility: MEDICAL CENTER | Age: 85
End: 2025-07-14
Attending: EMERGENCY MEDICINE
Payer: MEDICARE

## 2025-07-14 ENCOUNTER — APPOINTMENT (OUTPATIENT)
Dept: RADIOLOGY | Facility: MEDICAL CENTER | Age: 85
End: 2025-07-14
Attending: EMERGENCY MEDICINE
Payer: MEDICARE

## 2025-07-14 VITALS
DIASTOLIC BLOOD PRESSURE: 75 MMHG | BODY MASS INDEX: 28.44 KG/M2 | TEMPERATURE: 97.3 F | HEIGHT: 69 IN | WEIGHT: 192 LBS | OXYGEN SATURATION: 98 % | HEART RATE: 67 BPM | RESPIRATION RATE: 18 BRPM | SYSTOLIC BLOOD PRESSURE: 161 MMHG

## 2025-07-14 DIAGNOSIS — D61.818 PANCYTOPENIA (HCC): Primary | ICD-10-CM

## 2025-07-14 LAB
ALBUMIN SERPL BCP-MCNC: 3 G/DL (ref 3.2–4.9)
ALBUMIN/GLOB SERPL: 0.8 G/DL
ALP SERPL-CCNC: 298 U/L (ref 30–99)
ALT SERPL-CCNC: 36 U/L (ref 2–50)
ANION GAP SERPL CALC-SCNC: 8 MMOL/L (ref 7–16)
APPEARANCE UR: CLEAR
AST SERPL-CCNC: 62 U/L (ref 12–45)
BACTERIA #/AREA URNS HPF: ABNORMAL /HPF
BASOPHILS # BLD AUTO: 0.6 % (ref 0–1.8)
BASOPHILS # BLD: 0.02 K/UL (ref 0–0.12)
BILIRUB SERPL-MCNC: 0.4 MG/DL (ref 0.1–1.5)
BILIRUB UR QL STRIP.AUTO: NEGATIVE
BUN SERPL-MCNC: 18 MG/DL (ref 8–22)
CALCIUM ALBUM COR SERPL-MCNC: 9 MG/DL (ref 8.5–10.5)
CALCIUM SERPL-MCNC: 8.2 MG/DL (ref 8.5–10.5)
CASTS URNS QL MICRO: ABNORMAL /LPF (ref 0–2)
CHLORIDE SERPL-SCNC: 108 MMOL/L (ref 96–112)
CO2 SERPL-SCNC: 25 MMOL/L (ref 20–33)
COLOR UR: YELLOW
CREAT SERPL-MCNC: 1.34 MG/DL (ref 0.5–1.4)
EOSINOPHIL # BLD AUTO: 0.2 K/UL (ref 0–0.51)
EOSINOPHIL NFR BLD: 6.1 % (ref 0–6.9)
EPITHELIAL CELLS 1715: ABNORMAL /HPF (ref 0–5)
ERYTHROCYTE [DISTWIDTH] IN BLOOD BY AUTOMATED COUNT: 58.1 FL (ref 35.9–50)
GFR SERPLBLD CREATININE-BSD FMLA CKD-EPI: 52 ML/MIN/1.73 M 2
GLOBULIN SER CALC-MCNC: 3.6 G/DL (ref 1.9–3.5)
GLUCOSE SERPL-MCNC: 102 MG/DL (ref 65–99)
GLUCOSE UR STRIP.AUTO-MCNC: NEGATIVE MG/DL
HCT VFR BLD AUTO: 31.7 % (ref 42–52)
HGB BLD-MCNC: 10.6 G/DL (ref 14–18)
IMM GRANULOCYTES # BLD AUTO: 0.01 K/UL (ref 0–0.11)
IMM GRANULOCYTES NFR BLD AUTO: 0.3 % (ref 0–0.9)
KETONES UR STRIP.AUTO-MCNC: NEGATIVE MG/DL
LEUKOCYTE ESTERASE UR QL STRIP.AUTO: NEGATIVE
LYMPHOCYTES # BLD AUTO: 1.55 K/UL (ref 1–4.8)
LYMPHOCYTES NFR BLD: 47.1 % (ref 22–41)
MCH RBC QN AUTO: 35.3 PG (ref 27–33)
MCHC RBC AUTO-ENTMCNC: 33.4 G/DL (ref 32.3–36.5)
MCV RBC AUTO: 105.7 FL (ref 81.4–97.8)
MICRO URNS: ABNORMAL
MONOCYTES # BLD AUTO: 0.33 K/UL (ref 0–0.85)
MONOCYTES NFR BLD AUTO: 10 % (ref 0–13.4)
NEUTROPHILS # BLD AUTO: 1.18 K/UL (ref 1.82–7.42)
NEUTROPHILS NFR BLD: 35.9 % (ref 44–72)
NITRITE UR QL STRIP.AUTO: NEGATIVE
NRBC # BLD AUTO: 0 K/UL
NRBC BLD-RTO: 0 /100 WBC (ref 0–0.2)
PH UR STRIP.AUTO: 6.5 [PH] (ref 5–8)
PLATELET # BLD AUTO: 71 K/UL (ref 164–446)
PLATELET BLD QL SMEAR: NORMAL
PLATELETS.RETICULATED NFR BLD AUTO: 4.4 % (ref 0.6–13.1)
PMV BLD AUTO: 10.9 FL (ref 9–12.9)
POTASSIUM SERPL-SCNC: 4.3 MMOL/L (ref 3.6–5.5)
PROT SERPL-MCNC: 6.6 G/DL (ref 6–8.2)
PROT UR QL STRIP: NEGATIVE MG/DL
RBC # BLD AUTO: 3 M/UL (ref 4.7–6.1)
RBC # URNS HPF: ABNORMAL /HPF (ref 0–2)
RBC UR QL AUTO: ABNORMAL
SODIUM SERPL-SCNC: 141 MMOL/L (ref 135–145)
SP GR UR STRIP.AUTO: 1.02
TSH SERPL DL<=0.005 MIU/L-ACNC: 2.43 UIU/ML (ref 0.38–5.33)
UROBILINOGEN UR STRIP.AUTO-MCNC: 1 EU/DL
VALPROATE SERPL-MCNC: 49.3 UG/ML (ref 50–100)
WBC # BLD AUTO: 3.3 K/UL (ref 4.8–10.8)
WBC #/AREA URNS HPF: ABNORMAL /HPF

## 2025-07-14 PROCEDURE — 85055 RETICULATED PLATELET ASSAY: CPT

## 2025-07-14 PROCEDURE — 81001 URINALYSIS AUTO W/SCOPE: CPT

## 2025-07-14 PROCEDURE — 85025 COMPLETE CBC W/AUTO DIFF WBC: CPT

## 2025-07-14 PROCEDURE — 80053 COMPREHEN METABOLIC PANEL: CPT

## 2025-07-14 PROCEDURE — 80164 ASSAY DIPROPYLACETIC ACD TOT: CPT

## 2025-07-14 PROCEDURE — 36415 COLL VENOUS BLD VENIPUNCTURE: CPT

## 2025-07-14 PROCEDURE — 99284 EMERGENCY DEPT VISIT MOD MDM: CPT

## 2025-07-14 PROCEDURE — 87040 BLOOD CULTURE FOR BACTERIA: CPT

## 2025-07-14 PROCEDURE — 71045 X-RAY EXAM CHEST 1 VIEW: CPT

## 2025-07-14 PROCEDURE — 84443 ASSAY THYROID STIM HORMONE: CPT

## 2025-07-14 ASSESSMENT — FIBROSIS 4 INDEX: FIB4 SCORE: 5.15

## 2025-07-15 NOTE — ED NOTES
HI at bedside to transport patient back to his group home. Pt discharged to home. Pt and caregiver was given follow up instructions. Pt and caregiver verbalized understanding of all instructions for discharge.

## 2025-07-15 NOTE — ED NOTES
Report given to Dangelo, patient's caregiver at [facility]. Discussed discharge paperwork and follow up instructions.

## 2025-07-15 NOTE — ED TRIAGE NOTES
"Chief Complaint   Patient presents with    Fever     Subjective fever x2 months     Patient JARON DO 22 from [facility] for above. Per EMS, staff said he's been complaining of a subjective fever for approximately 2 months. They state they've been giving him daily Tylenol for his complaint. However, patient said yesterday his fever worsened. Staff reported no fevers, but patient said, \"I just feel like I'm burning up.\" Patient denied any pain, weakness, or other complaints.     Thania MOSER [phone] requested he be sent to the ED for further evaluation.     Patient has a history of dementia and staff reports patient behaving at baseline. A&Ox3 currently.     Bed alarm on, fall precautions in place, call light within reach.      /61   Pulse 85   Temp 36.3 °C (97.3 °F) (Temporal)   Resp 18   Ht 1.753 m (5' 9\")   Wt 87.1 kg (192 lb)   SpO2 95%   BMI 28.35 kg/m²     "

## 2025-07-15 NOTE — DISCHARGE PLANNING
Medical Social Work    MSW received a voalte message from bedside RN that pt is ready to return to his group home: [facility] Residence: [address], [place].  RN has already called report and [facility] is ready to accept pt back.  MSW faxed PCS and facesheet to [facility] and made follow up phone call to Maryanne to arrange transport for 2200.  Bedside RN made aware of transport time.

## 2025-07-15 NOTE — ED PROVIDER NOTES
"ED Provider Note    CHIEF COMPLAINT  Chief Complaint   Patient presents with    Fever     Subjective fever x2 months     EXTERNAL RECORDS REVIEWED  Patient was last seen in the emergency department March 2025 after a ground-level fall.  He was last admitted earlier in March for jaundice.  He has a history of stroke, dementia, hypertension, hypothyroidism.       HPI/ROS  LIMITATION TO HISTORY   Select: : None  OUTSIDE HISTORIAN(S):  None    Sina Lewis is a 84 y.o. male who presents to the Emergency Department via EMS with concern for fever onset 2 months ago. At bedside, patient states that he does not think that he is sick. He notes that he thought he had a fever for about a day, however it has not gone away. He also reports being a little constipated however denies any other complaints. Per triage note, the staff at [facility] report that the patient has been complaining about a fever daily and would give the patient Tylenol for this daily. Staff reports no recoreded fever, but patient endorsed that he felt as though he was \"burning up.\" Denies any chest pain, abdominal pain, cough, congestion, sore throat, pain with urination, or rashes. The patient has no known drug allergies.     PAST MEDICAL HISTORY  Past Medical History[1]     SURGICAL HISTORY  Past Surgical History[2]     FAMILY HISTORY  History reviewed. No pertinent family history.    SOCIAL HISTORY   reports that he quit smoking about 60 years ago. His smoking use included cigarettes. He has never used smokeless tobacco. He reports current alcohol use. He reports that he does not use drugs.    CURRENT MEDICATIONS  Previous Medications    ACETAMINOPHEN (TYLENOL) 500 MG TAB    Take 500 mg by mouth every 6 hours as needed for Mild Pain.    ASPIRIN 81 MG EC TABLET    Take 81 mg by mouth every day.    BENAZEPRIL (LOTENSIN) 10 MG TAB    Take 10 mg by mouth at bedtime.    BUSPIRONE (BUSPAR) 5 MG TABLET    Take 5 mg by mouth 3 " "times a day.    DEXTROMETHORPHAN POLISTIREX ER (DELSYM) 30 MG/5ML SUSPENSION EXTENDED RELEASE    Take 30 mg by mouth every 8 hours as needed for Cough.    DIVALPROEX (DEPAKOTE) 250 MG TABLET DELAYED RESPONSE    Take 250 mg by mouth 3 times a day.    LEVOTHYROXINE (SYNTHROID) 88 MCG TAB    Take 88 mcg by mouth every morning on an empty stomach.    LIDOCAINE (ASPERCREME LIDOCAINE) 4 % PATCH    Place 1 Patch on the skin 1 time a day as needed.    LORAZEPAM (ATIVAN) 0.5 MG TAB    Take 0.5 mg by mouth 4 times a day as needed for Anxiety.    MAGNESIUM HYDROXIDE (MILK OF MAGNESIA) 400 MG/5ML SUSPENSION    Take 30 mL by mouth 1 time a day as needed (no BM for 3 days).    MELATONIN 10 MG TAB    Take 10 mg by mouth at bedtime.    OMEPRAZOLE (PRILOSEC) 40 MG DELAYED-RELEASE CAPSULE    Take 40 mg by mouth 2 times a day.    ONDANSETRON (ZOFRAN) 4 MG TAB TABLET    Take 4 mg by mouth every four hours as needed for Nausea/Vomiting.    QUETIAPINE (SEROQUEL) 300 MG TABLET    Take 300 mg by mouth at bedtime.    SENNA-DOCUSATE (PERICOLACE OR SENOKOT S) 8.6-50 MG TAB    Take 1 Tablet by mouth 2 times a day. Hold for loose stools    SENNOSIDES (SENOKOT) 8.6 MG TAB    Take 8.6 mg by mouth 2 times a day as needed (constipation). DISCONTINUED BY PROVIDER  Indications: Constipation    TAMSULOSIN (FLOMAX) 0.4 MG CAPSULE    Take 0.4 mg by mouth every day.    TRIAMCINOLONE ACETONIDE (KENALOG) 0.1 % CREAM    Apply 1 Application topically 2 times a day. For rashes       ALLERGIES  Nkda [no known drug allergy]    PHYSICAL EXAM  /61   Pulse 85   Temp 36.3 °C (97.3 °F) (Temporal)   Resp 18   Ht 1.753 m (5' 9\")   Wt 87.1 kg (192 lb)   SpO2 95%      Constitutional: Nontoxic appearing. Alert in no apparent distress.  HENT: Normocephalic, Atraumatic. Bilateral external ears normal. Nose normal.  Moist mucous membranes.  Oropharynx clear.  Eyes: Pupils are equal and reactive. Conjunctiva normal.   Neck: Supple, full range of motion  Heart: " Regular rate and rhythm.  No murmurs.    Lungs: No respiratory distress, normal work of breathing. Lungs clear to auscultation bilaterally.  Abdomen Soft, no distention.  No tenderness to palpation.  Musculoskeletal: Atraumatic. No obvious deformities noted.  No lower extremity edema.  Skin: Warm, Dry.  No erythema, No rash.   Neurologic: Alert and oriented x3. Moving all extremities spontaneously without focal deficits.  Psychiatric: Affect normal, Mood normal, Appears appropriate and not intoxicated.    DIAGNOSTIC STUDIES / PROCEDURES    LABS  Labs Reviewed   CBC WITH DIFFERENTIAL - Abnormal; Notable for the following components:       Result Value    WBC 3.3 (*)     RBC 3.00 (*)     Hemoglobin 10.6 (*)     Hematocrit 31.7 (*)     .7 (*)     MCH 35.3 (*)     RDW 58.1 (*)     Platelet Count 71 (*)     Neutrophils-Polys 35.90 (*)     Lymphocytes 47.10 (*)     Neutrophils (Absolute) 1.18 (*)     All other components within normal limits   COMP METABOLIC PANEL - Abnormal; Notable for the following components:    Glucose 102 (*)     Calcium 8.2 (*)     AST(SGOT) 62 (*)     Alkaline Phosphatase 298 (*)     Albumin 3.0 (*)     Globulin 3.6 (*)     All other components within normal limits   URINALYSIS,CULTURE IF INDICATED - Abnormal; Notable for the following components:    Occult Blood Trace (*)     All other components within normal limits   VALPROIC ACID - Abnormal; Notable for the following components:    Valproic Acid 49.3 (*)     All other components within normal limits   ESTIMATED GFR - Abnormal; Notable for the following components:    GFR (CKD-EPI) 52 (*)     All other components within normal limits   URINE MICROSCOPIC (W/UA) - Abnormal; Notable for the following components:    RBC 3-5 (*)     All other components within normal limits   TSH WITH REFLEX TO FT4   PLATELET ESTIMATE   IMMATURE PLT FRACTION   BLOOD CULTURE   BLOOD CULTURE     RADIOLOGY  I have independently interpreted the diagnostic imaging  associated with this visit and am waiting the final reading from the radiologist.   My preliminary interpretation is as follows: no infiltrate    Radiologist interpretation:  DX-CHEST-PORTABLE (1 VIEW)   Final Result      No acute cardiac or pulmonary abnormalities are identified.          COURSE & MEDICAL DECISION MAKING    6:10 PM - Patient seen and examined at bedside. Discussed plan of care, including ordering imaging and labs to further evaluate. Patient agrees to the plan of care. Ordered for DX-Chest, TSH with reflex to FT4, Blood culture x2, Valproic acid, CBC with diff, CMP, UA to evaluate his symptoms.       ASSESSMENT, COURSE AND PLAN  Care Narrative: Elderly patient with dementia who presents with report from his group home that he has been complaining of a subjective fever for the last 1 to 2 months.  It does not seem that there has been any documented fever from his facility.  The patient is afebrile here with normal vital signs and he has no complaints at this time.  Workup was performed without any source of infection identified.  The patient does not have evidence of pneumonia or urinary tract infection.  Blood cultures were drawn and pending.  The patient's labs do show evidence of pancytopenia although neutropenia is not that significant to be concerning.  This is somewhat new for the patient and should have further outpatient workup.  The patient is taking Depakote which can have some hematologic effects and may be contributing.  Patient's workup is otherwise reassuring without renal dysfunction or electrolyte abnormality.  He will be discharged home with reassurance and instructions on outpatient follow-up with his primary provider regarding monitoring of abnormal labs.    I did discuss the plan of care with the patient's EHSAN Monteiro prior to discharge     - Upon reassessment, patient is resting comfortably with normal vital signs.  No new complaints at this time.  Discussed results with patient  and/or family as well as importance of primary care follow up.  Patient understands plan of care and strict return precautions for new or changing symptoms    ADDITIONAL PROBLEM LIST  Problem #1: Pancytopenia -needs outpatient follow-up with primary care provider, would consider transitioning off of Depakote or referral to hematology      DISPOSITION AND DISCUSSIONS    Escalation of care considered, and ultimately not performed:acute inpatient care management, however at this time, the patient is most appropriate for outpatient management    Decision tools and prescription drugs considered including, but not limited to: Antibiotics no signs of bacterial infection.    DISPOSITION:  Patient will be discharged home in stable condition.    FOLLOW UP:  Nuha Ware M.D.  [address] [place]  [facility] [id number] [phone]    Schedule an appointment as soon as possible for a visit       [facility], Emergency Dept  [address] [facility] [address] [phone]    If symptoms worsen      OUTPATIENT MEDICATIONS:  New Prescriptions    No medications on file         FINAL DIAGNOSIS  1. Pancytopenia (HCC)        The note accurately reflects work and decisions made by me.  Soila Johnson M.D.  7/14/2025  9:17 PM     Jillian KNOX (Nidhi), am scribing for, and in the presence of, Soila Johnson M.D..    Electronically signed by: Jillian Sultana), 7/14/2025    Soila KNOX M.D. personally performed the services described in this documentation, as scribed by Jillian Carbone in my presence, and it is both accurate and complete.            [1]   Past Medical History:  Diagnosis Date    Abdominal pain, bilateral lower quadrant     x 6 months    Acute MI (HCC)     Arrhythmia     Arthritis     Backpain     Bronchitis     Congestive heart failure (HCC)     Diverticulitis     Heart murmur     Hypertension     Pneumonia     Psychiatric problem     bipolar    Thyroid  disorder    [2]   Past Surgical History:  Procedure Laterality Date    HERNIA REPAIR      OTHER CARDIAC SURGERY      angioplasty 1996

## 2025-07-15 NOTE — DISCHARGE INSTRUCTIONS
You were seen in the Emergency Department for concern for fever.    Labs, chest xray and urine were completed and do not show any evidence of infection.    Your blood work did show a mildly low white blood cell count, red blood cell count and platelets.  This should be followed up with your doctor.  Depakote or valproic acid is a medication that can cause this and they may need to make some medication adjustments.  You may also need to get into see a blood doctor for this although this is not an urgent issue.    Please continue home medication.  Please monitor temperature to see if there is actually been a fever.    Please follow up with your primary care physician as soon as possible    Return to the Emergency Department with persistent fevers greater than 100.4, new or changing symptoms.

## 2025-07-15 NOTE — ED NOTES
Patient attempted to urinate in urinal, but states it spilled. Pericare performed, patient placed in new underwear, and sheets changed. Patient declined further needs at this time. Call light within reach and fall precautions in place.
